# Patient Record
Sex: FEMALE | Race: WHITE | NOT HISPANIC OR LATINO | Employment: OTHER | URBAN - METROPOLITAN AREA
[De-identification: names, ages, dates, MRNs, and addresses within clinical notes are randomized per-mention and may not be internally consistent; named-entity substitution may affect disease eponyms.]

---

## 2019-04-02 ENCOUNTER — HOSPITAL ENCOUNTER (INPATIENT)
Facility: HOSPITAL | Age: 79
LOS: 1 days | Discharge: HOME HEALTH | DRG: 066 | End: 2019-04-04
Attending: EMERGENCY MEDICINE
Payer: MEDICARE

## 2019-04-02 DIAGNOSIS — I71.40 ABDOMINAL AORTIC ANEURYSM (AAA) WITHOUT RUPTURE: Chronic | ICD-10-CM

## 2019-04-02 DIAGNOSIS — G45.9 TIA (TRANSIENT ISCHEMIC ATTACK): ICD-10-CM

## 2019-04-02 DIAGNOSIS — G45.9 TRANSIENT ISCHEMIC ATTACK: ICD-10-CM

## 2019-04-02 DIAGNOSIS — I63.9 STROKE: ICD-10-CM

## 2019-04-02 DIAGNOSIS — I63.9 ACUTE CVA (CEREBROVASCULAR ACCIDENT): Primary | ICD-10-CM

## 2019-04-02 PROBLEM — I10 ESSENTIAL HYPERTENSION: Chronic | Status: ACTIVE | Noted: 2019-04-02

## 2019-04-02 PROBLEM — N18.30 CKD (CHRONIC KIDNEY DISEASE) STAGE 3, GFR 30-59 ML/MIN: Chronic | Status: ACTIVE | Noted: 2019-04-02

## 2019-04-02 LAB
ALBUMIN SERPL BCP-MCNC: 3.6 G/DL (ref 3.5–5.2)
ALP SERPL-CCNC: 88 U/L (ref 55–135)
ALT SERPL W/O P-5'-P-CCNC: 7 U/L (ref 10–44)
ANION GAP SERPL CALC-SCNC: 17 MMOL/L (ref 8–16)
ANION GAP SERPL CALC-SCNC: 9 MMOL/L (ref 8–16)
APTT BLDCRRT: 28.7 SEC (ref 21–32)
AST SERPL-CCNC: 19 U/L (ref 10–40)
BASOPHILS # BLD AUTO: 0.01 K/UL (ref 0–0.2)
BASOPHILS NFR BLD: 0.1 % (ref 0–1.9)
BILIRUB SERPL-MCNC: 0.4 MG/DL (ref 0.1–1)
BUN SERPL-MCNC: 12 MG/DL (ref 6–30)
BUN SERPL-MCNC: 12 MG/DL (ref 8–23)
CALCIUM SERPL-MCNC: 9.2 MG/DL (ref 8.7–10.5)
CHLORIDE SERPL-SCNC: 107 MMOL/L (ref 95–110)
CHLORIDE SERPL-SCNC: 109 MMOL/L (ref 95–110)
CHOLEST SERPL-MCNC: 141 MG/DL (ref 120–199)
CHOLEST/HDLC SERPL: 5.4 {RATIO} (ref 2–5)
CO2 SERPL-SCNC: 24 MMOL/L (ref 23–29)
CREAT SERPL-MCNC: 1.1 MG/DL (ref 0.5–1.4)
CREAT SERPL-MCNC: 1.2 MG/DL (ref 0.5–1.4)
DIFFERENTIAL METHOD: ABNORMAL
EOSINOPHIL # BLD AUTO: 0.2 K/UL (ref 0–0.5)
EOSINOPHIL NFR BLD: 2.6 % (ref 0–8)
ERYTHROCYTE [DISTWIDTH] IN BLOOD BY AUTOMATED COUNT: 14.2 % (ref 11.5–14.5)
EST. GFR  (AFRICAN AMERICAN): 50 ML/MIN/1.73 M^2
EST. GFR  (NON AFRICAN AMERICAN): 43 ML/MIN/1.73 M^2
GLUCOSE SERPL-MCNC: 72 MG/DL (ref 70–110)
GLUCOSE SERPL-MCNC: 86 MG/DL (ref 70–110)
HCT VFR BLD AUTO: 41.2 % (ref 37–48.5)
HCT VFR BLD CALC: 37 %PCV (ref 36–54)
HDLC SERPL-MCNC: 26 MG/DL (ref 40–75)
HDLC SERPL: 18.4 % (ref 20–50)
HGB BLD-MCNC: 12.7 G/DL (ref 12–16)
INR PPP: 1 (ref 0.8–1.2)
LDLC SERPL CALC-MCNC: 77.4 MG/DL (ref 63–159)
LYMPHOCYTES # BLD AUTO: 1.6 K/UL (ref 1–4.8)
LYMPHOCYTES NFR BLD: 21.2 % (ref 18–48)
MCH RBC QN AUTO: 27.5 PG (ref 27–31)
MCHC RBC AUTO-ENTMCNC: 30.8 G/DL (ref 32–36)
MCV RBC AUTO: 89 FL (ref 82–98)
MONOCYTES # BLD AUTO: 0.7 K/UL (ref 0.3–1)
MONOCYTES NFR BLD: 9 % (ref 4–15)
NEUTROPHILS # BLD AUTO: 5.1 K/UL (ref 1.8–7.7)
NEUTROPHILS NFR BLD: 66.8 % (ref 38–73)
NONHDLC SERPL-MCNC: 115 MG/DL
PLATELET # BLD AUTO: 273 K/UL (ref 150–350)
PMV BLD AUTO: 10.4 FL (ref 9.2–12.9)
POC IONIZED CALCIUM: 1.16 MMOL/L (ref 1.06–1.42)
POC TCO2 (MEASURED): 23 MMOL/L (ref 23–29)
POTASSIUM BLD-SCNC: 3.9 MMOL/L (ref 3.5–5.1)
POTASSIUM SERPL-SCNC: 3.8 MMOL/L (ref 3.5–5.1)
PROT SERPL-MCNC: 7.8 G/DL (ref 6–8.4)
PROTHROMBIN TIME: 10.6 SEC (ref 9–12.5)
RBC # BLD AUTO: 4.62 M/UL (ref 4–5.4)
SAMPLE: ABNORMAL
SODIUM BLD-SCNC: 142 MMOL/L (ref 136–145)
SODIUM SERPL-SCNC: 142 MMOL/L (ref 136–145)
TRIGL SERPL-MCNC: 188 MG/DL (ref 30–150)
TROPONIN I SERPL DL<=0.01 NG/ML-MCNC: <0.006 NG/ML (ref 0–0.03)
TSH SERPL DL<=0.005 MIU/L-ACNC: 1.85 UIU/ML (ref 0.4–4)
WBC # BLD AUTO: 7.58 K/UL (ref 3.9–12.7)

## 2019-04-02 PROCEDURE — 63600175 PHARM REV CODE 636 W HCPCS: Performed by: EMERGENCY MEDICINE

## 2019-04-02 PROCEDURE — 93010 ELECTROCARDIOGRAM REPORT: CPT | Mod: ,,, | Performed by: INTERNAL MEDICINE

## 2019-04-02 PROCEDURE — 84132 ASSAY OF SERUM POTASSIUM: CPT

## 2019-04-02 PROCEDURE — 82565 ASSAY OF CREATININE: CPT

## 2019-04-02 PROCEDURE — 83036 HEMOGLOBIN GLYCOSYLATED A1C: CPT

## 2019-04-02 PROCEDURE — G0378 HOSPITAL OBSERVATION PER HR: HCPCS

## 2019-04-02 PROCEDURE — 85014 HEMATOCRIT: CPT

## 2019-04-02 PROCEDURE — 94761 N-INVAS EAR/PLS OXIMETRY MLT: CPT

## 2019-04-02 PROCEDURE — 80053 COMPREHEN METABOLIC PANEL: CPT

## 2019-04-02 PROCEDURE — 93010 EKG 12-LEAD: ICD-10-PCS | Mod: ,,, | Performed by: INTERNAL MEDICINE

## 2019-04-02 PROCEDURE — 84443 ASSAY THYROID STIM HORMONE: CPT

## 2019-04-02 PROCEDURE — 84295 ASSAY OF SERUM SODIUM: CPT

## 2019-04-02 PROCEDURE — 85025 COMPLETE CBC W/AUTO DIFF WBC: CPT

## 2019-04-02 PROCEDURE — 82330 ASSAY OF CALCIUM: CPT

## 2019-04-02 PROCEDURE — 25000003 PHARM REV CODE 250: Performed by: EMERGENCY MEDICINE

## 2019-04-02 PROCEDURE — 25500020 PHARM REV CODE 255: Performed by: EMERGENCY MEDICINE

## 2019-04-02 PROCEDURE — 96372 THER/PROPH/DIAG INJ SC/IM: CPT | Mod: 59

## 2019-04-02 PROCEDURE — 99900035 HC TECH TIME PER 15 MIN (STAT)

## 2019-04-02 PROCEDURE — 99285 EMERGENCY DEPT VISIT HI MDM: CPT | Mod: 25

## 2019-04-02 PROCEDURE — 85730 THROMBOPLASTIN TIME PARTIAL: CPT

## 2019-04-02 PROCEDURE — 80061 LIPID PANEL: CPT

## 2019-04-02 PROCEDURE — 84484 ASSAY OF TROPONIN QUANT: CPT

## 2019-04-02 PROCEDURE — 93005 ELECTROCARDIOGRAM TRACING: CPT

## 2019-04-02 PROCEDURE — 85610 PROTHROMBIN TIME: CPT

## 2019-04-02 RX ORDER — METOPROLOL SUCCINATE 25 MG/1
25 TABLET, EXTENDED RELEASE ORAL DAILY
Status: DISCONTINUED | OUTPATIENT
Start: 2019-04-03 | End: 2019-04-03

## 2019-04-02 RX ORDER — NAPROXEN SODIUM 220 MG/1
81 TABLET, FILM COATED ORAL
Status: COMPLETED | OUTPATIENT
Start: 2019-04-02 | End: 2019-04-02

## 2019-04-02 RX ORDER — POLYETHYLENE GLYCOL 3350 17 G/17G
17 POWDER, FOR SOLUTION ORAL DAILY
Status: DISCONTINUED | OUTPATIENT
Start: 2019-04-03 | End: 2019-04-04 | Stop reason: HOSPADM

## 2019-04-02 RX ORDER — ENOXAPARIN SODIUM 100 MG/ML
40 INJECTION SUBCUTANEOUS EVERY 24 HOURS
Status: DISCONTINUED | OUTPATIENT
Start: 2019-04-02 | End: 2019-04-03

## 2019-04-02 RX ORDER — ASPIRIN 325 MG
325 TABLET ORAL DAILY
Status: ON HOLD | COMMUNITY
End: 2019-04-03 | Stop reason: HOSPADM

## 2019-04-02 RX ORDER — CLOPIDOGREL BISULFATE 75 MG/1
75 TABLET ORAL DAILY
Status: DISCONTINUED | OUTPATIENT
Start: 2019-04-03 | End: 2019-04-04 | Stop reason: HOSPADM

## 2019-04-02 RX ORDER — HYDROCODONE BITARTRATE AND ACETAMINOPHEN 5; 325 MG/1; MG/1
1 TABLET ORAL EVERY 4 HOURS PRN
Status: DISCONTINUED | OUTPATIENT
Start: 2019-04-02 | End: 2019-04-03

## 2019-04-02 RX ORDER — AMLODIPINE BESYLATE 5 MG/1
10 TABLET ORAL DAILY
Status: DISCONTINUED | OUTPATIENT
Start: 2019-04-03 | End: 2019-04-03

## 2019-04-02 RX ORDER — CLOPIDOGREL BISULFATE 75 MG/1
75 TABLET ORAL
Status: COMPLETED | OUTPATIENT
Start: 2019-04-02 | End: 2019-04-02

## 2019-04-02 RX ORDER — SODIUM CHLORIDE 0.9 % (FLUSH) 0.9 %
10 SYRINGE (ML) INJECTION
Status: DISCONTINUED | OUTPATIENT
Start: 2019-04-02 | End: 2019-04-04 | Stop reason: HOSPADM

## 2019-04-02 RX ORDER — VALSARTAN 80 MG/1
160 TABLET ORAL DAILY
Status: DISCONTINUED | OUTPATIENT
Start: 2019-04-03 | End: 2019-04-03

## 2019-04-02 RX ADMIN — CLOPIDOGREL BISULFATE 75 MG: 75 TABLET ORAL at 06:04

## 2019-04-02 RX ADMIN — IOHEXOL 75 ML: 350 INJECTION, SOLUTION INTRAVENOUS at 04:04

## 2019-04-02 RX ADMIN — ENOXAPARIN SODIUM 40 MG: 100 INJECTION SUBCUTANEOUS at 06:04

## 2019-04-02 RX ADMIN — ASPIRIN 81 MG 81 MG: 81 TABLET ORAL at 06:04

## 2019-04-02 NOTE — ASSESSMENT & PLAN NOTE
Patient with history of CVA 2014 . Takes ASA but off plavix 1 year ago. Symptoms concerning for acute stroke. Patient presents with left sided weakness and numbness since Sunday night 3/31/19. Symptoms are improving. CTA head neck pening. 2 D echo ordered. PT/OT consult. Continue ASA and statin. Restart plavix? Allow permissive HTN until CTA result. Follow up Neurology further recommendations.

## 2019-04-02 NOTE — HPI
Mr. Lozada is a 77 yo female with significant history for hypertension, AAA, diastolic dysfunction, CKD stage 3 and hx of CVA in 2014 who comes to the hospital for left sides weakness and numbness since Michael 3/31/19. Patient woke up Sunday night with left sided numbness/weakness. Patient unable to ambulates yesterday but now able to ambulate with unsteady gait today. Patient drove self to hospital. Reports symptoms are improving but not back to baseline. Denies headaches, dizziness, change in vision or change in speech. Continues to take ASA but off plavix a year ago. Does not have home BP.     BP on admit 160/115 improved without intervention 111's/50-60's. EKG NSR with first degree AVB. CXR clear. Neurology consult in ED. CTA head neck pending.

## 2019-04-02 NOTE — ASSESSMENT & PLAN NOTE
Hx of endovascular stent graft placement. Encourage smoking cessation. No back or abdominal pain at this time. Stable.

## 2019-04-02 NOTE — ED TRIAGE NOTES
Pt reports she believes she had a stroke on Sunday night and states that she had numbness and tingling with weakness to left side.  Reports that she has had residual weakness and does not feel herself since Michael night.  Denies cp, sob, f/c/n/v/d.  Placed on cardiac monitor, bp cuff, and pulse ox.  VSS, NAD.

## 2019-04-02 NOTE — ED PROVIDER NOTES
"Encounter Date: 4/2/2019  SORT: This is a 78 y.o. female PMHx stroke, aneurysm, HTN who presents for emergent consideration of possible stroke on Sunday. She reports left sided numbness and weakness which began on Sunday. She states inability to walk on Monday, but now she is able to walk. She also reports associated SOB. She states "I didn't come because I'm hard headed." Patient will be moved to a room when one is available for further evaluation and treatment.  JOHN Prescott, PA-C     SCRIBE #1 NOTE: I, Chente Rao, am scribing for, and in the presence of,  Gerardo Jay MD. I have scribed the following portions of the note - Other sections scribed: HPI and ROS.       History     Chief Complaint   Patient presents with    Numbness     " I had a stroke on Sunday." Reports numbness to left arm, and left leg. reports Hx of stroke     CC: Weakness    HPI: This 78 y.o F with a hx of an abdominal aortic aneurysm and HTN presents to the ED c/o acute onset of LUE and LLE weakness since 4/1/19 AM. The pt states "I had a stroke in my sleep." She states that she was unable to ambulate without assistant yesterday, but her strength improved today. She is ambulatory without assistance today. Additionally, the pt reports a generalized headache. The pt denies speech difficulty, fever, chills, diaphoresis, nausea, emesis, diarrhea, abdominal pain, dysuria, extremity pain, visual disturbances, otalgia, sore throat, chest pain, cough, SOB and rash. No prior tx.     PSHx: Abdominal aortic aneurysm repair, endovascular stent        The history is provided by the patient. No  was used.     Review of patient's allergies indicates:   Allergen Reactions    Tetanus vaccines and toxoid Rash     Past Medical History:   Diagnosis Date    Aneurysm     Hypertension      Past Surgical History:   Procedure Laterality Date    ABDOMINAL AORTIC ANEURYSM REPAIR       Family History   Problem Relation Age of Onset    " Heart disease Mother     Cancer Father      Social History     Tobacco Use    Smoking status: Current Every Day Smoker     Packs/day: 0.50     Types: Cigarettes    Smokeless tobacco: Never Used   Substance Use Topics    Alcohol use: No    Drug use: No     Review of Systems   Constitutional: Negative for chills, diaphoresis and fever.   HENT: Negative for congestion, ear pain, rhinorrhea and sore throat.    Eyes: Negative for pain and visual disturbance.   Respiratory: Negative for cough and shortness of breath.    Cardiovascular: Negative for chest pain.   Gastrointestinal: Negative for abdominal pain, diarrhea, nausea and vomiting.   Genitourinary: Negative for dysuria.   Musculoskeletal: Negative for back pain and neck pain.   Skin: Negative for rash.   Neurological: Positive for weakness and headaches. Negative for speech difficulty.       Physical Exam     Initial Vitals [04/02/19 1510]   BP Pulse Resp Temp SpO2   (!) 160/115 77 18 98 °F (36.7 °C) 97 %      MAP       --         Physical Exam  The patient was examined specifically for the following:   General:No significant distress, Good color, Warm and dry. Head and neck:Scalp atraumatic, Neck supple. Neurological:Appropriate conversation, Gross motor deficits. Eyes:Conjugate gaze, Clear corneas. ENT: No epistaxis. Cardiac: Regular rate and rhythm, Grossly normal heart tones. Pulmonary: Wheezing, Rales. Gastrointestinal: Abdominal tenderness, Abdominal distention. Musculoskeletal: Extremity deformity, Apparent pain with range of motion of the joints. Skin: Rash.   The findings on examination were normal.  Mental status examination, cranial nerves, motor and sensory examination are normal. The lungs are clear.  The heart tones are normal.  The abdomen is soft.  ED Course   Procedures  Labs Reviewed   COMPREHENSIVE METABOLIC PANEL - Abnormal; Notable for the following components:       Result Value    ALT 7 (*)     eGFR if  50 (*)     eGFR if  non  43 (*)     All other components within normal limits   CBC W/ AUTO DIFFERENTIAL - Abnormal; Notable for the following components:    MCHC 30.8 (*)     All other components within normal limits   ISTAT PROCEDURE - Abnormal; Notable for the following components:    POC Anion Gap 17 (*)     All other components within normal limits   APTT   PROTIME-INR   TROPONIN I   LIPID PANEL   TSH   ISTAT CHEM8     EKG Readings: (Independently Interpreted)   This patient is in a sinus rhythm with a sinus arrhythmia with a heart rate of 70.  There is a first-degree AV block.  The there is no definite evidence of acute myocardial infarction or malignant arrhythmia.  There are no ST segment or T-wave changes.       Imaging Results           CTA Head and Neck (xpd) (Final result)  Result time 04/02/19 18:02:04    Final result by Rohini Sierra MD (04/02/19 18:02:04)                 Impression:      Near occlusive stenosis at the left common carotid artery at the level of the bifurcation.  Aberrant coursing of the left common carotid and internal carotid artery.    Greater than 70% stenosis at the right common carotid artery bifurcation secondary to significant atherosclerotic disease.  Recommend ultrasound, when clinically appropriate.    Aneurysmal dilatation of the ascending thoracic aorta, which is incompletely imaged.    This report was flagged in Epic as abnormal.      Electronically signed by: Rohini Sierra  Date:    04/02/2019  Time:    18:02             Narrative:    EXAMINATION:  CTA HEAD AND NECK (XPD)    CLINICAL HISTORY:  Focal neuro deficit, new, fixed or worsening, >6 hours;    TECHNIQUE:  CT angiogram was performed from the level of the mack to the top of the head following the IV administration of 75mL of Omnipaque 350.   Sagittal and coronal reconstructions and maximum intensity projection reconstructions were performed. Arterial stenosis percentages are based on NASCET measurement  criteria.    COMPARISON:  11/11/2014    FINDINGS:  Intracranial Compartment:    There is mild cerebral atrophy and chronic small vessel ischemic changes.  There is no acute cranial hemorrhage, definite territorial infarct, mass effect, or midline shift.  If persistent neurological findings, recommend MRI of the brain with diffusion-weighted sequencing.    Aorta: The ascending thoracic aorta is incompletely imaged.  The ascending thoracic aorta visualized measures greater than 4.8 cm in maximal diameter.  The left common carotid artery is seen arising from the surgically repaired thoracic aorta.    Extracranial carotid circulation: There is near occlusive stenosis at the left common carotid artery at the level of the bifurcation.  Aberrant coursing of the left common carotid and internal carotid artery is noted.  There is greater than 70% stenosis at the right common carotid artery bifurcation secondary to significant atherosclerotic disease.    Extracranial vertebral circulation: No hemodynamically significant stenosis, aneurysmal dilatation, or dissection.    Intracranial Arteries: No focal high-grade stenosis, occlusion, or aneurysm.                               X-Ray Chest AP Portable (Final result)  Result time 04/02/19 15:55:41    Final result by Mitali Hawkins MD (04/02/19 15:55:41)                 Impression:      No acute abnormality.      Electronically signed by: Mitali Hawkins MD  Date:    04/02/2019  Time:    15:55             Narrative:    EXAMINATION:  XR CHEST AP PORTABLE    CLINICAL HISTORY:  chest pain;    TECHNIQUE:  Single frontal view of the chest was performed.    COMPARISON:  05/18/2016    FINDINGS:  EKG wires overlie the chest.The lungs are clear, with normal appearance of pulmonary vasculature and no pleural effusion or pneumothorax.    The cardiac silhouette is normal in size. The hilar and mediastinal contours are unremarkable.    Bones are intact.                               Medical decision making:  Given the above, this patient presents to the emergency room with left arm numbness and a little left leg weakness.  The patient reports that she had severe weakness 24 hr ago.  She may well have had a stroke.  TIA is also a possibility.  CTA head neck pending.  I discussed this case with , who recommended observation CTA head and neck and 2D echocardiography.  I will write orders for observation.    This patient does not meet tPA criteria because she has had rapid an almost complete resolution of symptoms. Her neurologic examination is normal.                Scribe Attestation:   Scribe #1: I performed the above scribed service and the documentation accurately describes the services I performed. I attest to the accuracy of the note.    Attending Attestation:           Physician Attestation for Scribe:  Physician Attestation Statement for Scribe #1: I, Gerardo Jay MD, reviewed documentation, as scribed by Chente Rao in my presence, and it is both accurate and complete.                    Clinical Impression:       ICD-10-CM ICD-9-CM   1. Transient ischemic attack G45.9 435.9                                Gerardo Jay MD  04/02/19 1815       Gerardo Jay MD  04/02/19 1817

## 2019-04-02 NOTE — SUBJECTIVE & OBJECTIVE
Past Medical History:   Diagnosis Date    Aneurysm     Hypertension        Past Surgical History:   Procedure Laterality Date    ABDOMINAL AORTIC ANEURYSM REPAIR         Review of patient's allergies indicates:   Allergen Reactions    Tetanus vaccines and toxoid Rash       No current facility-administered medications on file prior to encounter.      Current Outpatient Medications on File Prior to Encounter   Medication Sig    amlodipine (NORVASC) 10 MG tablet Take 50 mg by mouth once daily.    metoprolol succinate (TOPROL-XL) 25 MG 24 hr tablet Take 10 mg by mouth once daily.    valsartan (DIOVAN) 160 MG tablet Take 160 mg by mouth once daily.    clopidogrel (PLAVIX) 75 mg tablet Take 1 tablet (75 mg total) by mouth once daily.     Family History     Problem Relation (Age of Onset)    Cancer Father    Heart disease Mother        Tobacco Use    Smoking status: Current Every Day Smoker     Packs/day: 0.50     Types: Cigarettes    Smokeless tobacco: Never Used   Substance and Sexual Activity    Alcohol use: No    Drug use: No    Sexual activity: Not on file     Review of Systems   Constitutional: Negative.    HENT: Negative.    Eyes: Negative.    Respiratory: Negative.    Cardiovascular: Negative.    Endocrine: Negative.    Genitourinary: Negative.    Musculoskeletal: Negative.    Neurological: Positive for weakness and numbness.   Hematological: Negative.    Psychiatric/Behavioral: Negative.      Objective:     Vital Signs (Most Recent):  Temp: 98 °F (36.7 °C) (04/02/19 1510)  Pulse: 70 (04/02/19 1557)  Resp: (!) 28 (04/02/19 1557)  BP: 113/68 (04/02/19 1547)  SpO2: 96 % (04/02/19 1557) Vital Signs (24h Range):  Temp:  [98 °F (36.7 °C)] 98 °F (36.7 °C)  Pulse:  [66-77] 70  Resp:  [18-28] 28  SpO2:  [96 %-98 %] 96 %  BP: (113-160)/() 113/68     Weight: 55.3 kg (122 lb)  Body mass index is 24.64 kg/m².    Physical Exam   Constitutional: She is oriented to person, place, and time. She appears  well-developed and well-nourished.   HENT:   Head: Atraumatic.   Eyes: EOM are normal.   Neck: Neck supple.   Cardiovascular: Normal rate and regular rhythm.   Pulmonary/Chest: Effort normal.   Abdominal: Soft. Bowel sounds are normal.   Musculoskeletal: Normal range of motion. She exhibits no edema or deformity.   Neurological: She is alert and oriented to person, place, and time. A sensory deficit is present. Coordination (did walk patient as patient did not feel steady or back to baseline. ) abnormal.   Negative finger nose exam. No hand drift.    Skin: Skin is warm and dry.   Psychiatric: She has a normal mood and affect.        Significant Labs: All pertinent labs within the past 24 hours have been reviewed.    Significant Imaging: I have reviewed and interpreted all pertinent imaging results/findings within the past 24 hours.

## 2019-04-02 NOTE — H&P
"[facility] Hospital Medicine  History & Physical    Patient Name: Cleopatra Lozada  MRN: [id number]  Admission Date: 4/2/2019  Attending Physician: Gerardo Jay MD   Primary Care Provider: Latesha Dias MD         Patient information was obtained from patient and ER records.     Subjective:     Principal Problem:Acute CVA (cerebrovascular accident)    Chief Complaint:   Chief Complaint   Patient presents with    Numbness     " I had a stroke on Sunday." Reports numbness to left arm, and left leg. reports Hx of stroke        HPI: Mr. Lozada is a 79 yo female with significant history for hypertension, AAA, diastolic dysfunction, CKD stage 3 and hx of CVA in 2014 who comes to the hospital for left sides weakness and numbness since Michael 3/31/19. Patient woke up Sunday night with left sided numbness/weakness. Patient unable to ambulates yesterday but now able to ambulate with unsteady gait today. Patient drove self to hospital. Reports symptoms are improving but not back to baseline. Denies headaches, dizziness, change in vision or change in speech. Continues to take ASA but off plavix a year ago. Does not have home BP.     BP on admit 160/115 improved without intervention 111's/50-60's. EKG NSR with first degree AVB. CXR clear. Neurology consult in ED. CTA head neck pending.     Past Medical History:   Diagnosis Date    Aneurysm     Hypertension        Past Surgical History:   Procedure Laterality Date    ABDOMINAL AORTIC ANEURYSM REPAIR         Review of patient's allergies indicates:   Allergen Reactions    Tetanus vaccines and toxoid Rash       No current facility-administered medications on file prior to encounter.      Current Outpatient Medications on File Prior to Encounter   Medication Sig    amlodipine (NORVASC) 10 MG tablet Take 50 mg by mouth once daily.    metoprolol succinate (TOPROL-XL) 25 MG 24 hr tablet Take 10 mg by mouth once daily.    valsartan (DIOVAN) 160 MG " tablet Take 160 mg by mouth once daily.    clopidogrel (PLAVIX) 75 mg tablet Take 1 tablet (75 mg total) by mouth once daily.     Family History     Problem Relation (Age of Onset)    Cancer Father    Heart disease Mother        Tobacco Use    Smoking status: Current Every Day Smoker     Packs/day: 0.50     Types: Cigarettes    Smokeless tobacco: Never Used   Substance and Sexual Activity    Alcohol use: No    Drug use: No    Sexual activity: Not on file     Review of Systems   Constitutional: Negative.    HENT: Negative.    Eyes: Negative.    Respiratory: Negative.    Cardiovascular: Negative.    Endocrine: Negative.    Genitourinary: Negative.    Musculoskeletal: Negative.    Neurological: Positive for weakness and numbness.   Hematological: Negative.    Psychiatric/Behavioral: Negative.      Objective:     Vital Signs (Most Recent):  Temp: 98 °F (36.7 °C) (04/02/19 1510)  Pulse: 70 (04/02/19 1557)  Resp: (!) 28 (04/02/19 1557)  BP: 113/68 (04/02/19 1547)  SpO2: 96 % (04/02/19 1557) Vital Signs (24h Range):  Temp:  [98 °F (36.7 °C)] 98 °F (36.7 °C)  Pulse:  [66-77] 70  Resp:  [18-28] 28  SpO2:  [96 %-98 %] 96 %  BP: (113-160)/() 113/68     Weight: 55.3 kg (122 lb)  Body mass index is 24.64 kg/m².    Physical Exam   Constitutional: She is oriented to person, place, and time. She appears well-developed and well-nourished.   HENT:   Head: Atraumatic.   Eyes: EOM are normal.   Neck: Neck supple.   Cardiovascular: Normal rate and regular rhythm.   Pulmonary/Chest: Effort normal.   Abdominal: Soft. Bowel sounds are normal.   Musculoskeletal: Normal range of motion. She exhibits no edema or deformity.   Neurological: She is alert and oriented to person, place, and time. A sensory deficit is present. Coordination (did walk patient as patient did not feel steady or back to baseline. ) abnormal.   Negative finger nose exam. No hand drift.    Skin: Skin is warm and dry.   Psychiatric: She has a normal mood and  affect.        Significant Labs: All pertinent labs within the past 24 hours have been reviewed.    Significant Imaging: I have reviewed and interpreted all pertinent imaging results/findings within the past 24 hours.    Assessment/Plan:     * Acute CVA (cerebrovascular accident)  Patient with history of CVA 2014 . Takes ASA but off plavix 1 year ago. Symptoms concerning for acute stroke. Patient presents with left sided weakness and numbness since Sunday night 3/31/19. Symptoms are improving. CTA head neck pening. 2 D echo ordered. PT/OT consult. Continue ASA and statin. Restart plavix? Allow permissive HTN until CTA result. Follow up Neurology further recommendations.       Abdominal aortic aneurysm  Hx of endovascular stent graft placement. Encourage smoking cessation. No back or abdominal pain at this time. Stable.       CKD (chronic kidney disease) stage 3, GFR 30-59 ml/min  Stable. Avoid nephrotoxins.       Essential hypertension  Currently controlled. Allow permissive HTN for now.         VTE Risk Mitigation (From admission, onward)    None             Michelle Castillo NP  Department of Hospital Medicine   [facility]

## 2019-04-03 PROBLEM — Z01.810 PREOP CARDIOVASCULAR EXAM: Status: ACTIVE | Noted: 2019-04-03

## 2019-04-03 PROBLEM — G45.9 TRANSIENT ISCHEMIC ATTACK: Status: RESOLVED | Noted: 2019-04-02 | Resolved: 2019-04-03

## 2019-04-03 LAB
AORTIC ROOT ANNULUS: 2.49 CM
AORTIC VALVE CUSP SEPERATION: 1.28 CM
ASCENDING AORTA: 3.94 CM
AV INDEX (PROSTH): 0.53
AV MEAN GRADIENT: 11.75 MMHG
AV PEAK GRADIENT: 21.72 MMHG
AV VALVE AREA: 1.71 CM2
AV VELOCITY RATIO: 0.53
BSA FOR ECHO PROCEDURE: 1.49 M2
CV ECHO LV RWT: 0.67 CM
CV STRESS BASE HR: 82 BPM
DIASTOLIC BLOOD PRESSURE: 90 MMHG
DOP CALC AO PEAK VEL: 2.33 M/S
DOP CALC AO VTI: 42.05 CM
DOP CALC LVOT AREA: 3.2 CM2
DOP CALC LVOT DIAMETER: 2.02 CM
DOP CALC LVOT PEAK VEL: 1.24 M/S
DOP CALC LVOT STROKE VOLUME: 72.04 CM3
DOP CALCLVOT PEAK VEL VTI: 22.49 CM
E WAVE DECELERATION TIME: 408.55 MSEC
E/A RATIO: 0.52
ECHO LV POSTERIOR WALL: 1.11 CM (ref 0.6–1.1)
ESTIMATED AVG GLUCOSE: 108 MG/DL (ref 68–131)
FRACTIONAL SHORTENING: 26 % (ref 28–44)
HBA1C MFR BLD HPLC: 5.4 % (ref 4–5.6)
INTERVENTRICULAR SEPTUM: 1.11 CM (ref 0.6–1.1)
IVRT: 0.07 MSEC
LA MAJOR: 3.58 CM
LA MINOR: 4.76 CM
LA WIDTH: 3.49 CM
LEFT ATRIUM SIZE: 3.1 CM
LEFT ATRIUM VOLUME INDEX: 25.6 ML/M2
LEFT ATRIUM VOLUME: 37.58 CM3
LEFT INTERNAL DIMENSION IN SYSTOLE: 2.46 CM (ref 2.1–4)
LEFT VENTRICLE DIASTOLIC VOLUME INDEX: 30.83 ML/M2
LEFT VENTRICLE DIASTOLIC VOLUME: 45.27 ML
LEFT VENTRICLE MASS INDEX: 76.4 G/M2
LEFT VENTRICLE SYSTOLIC VOLUME INDEX: 14.6 ML/M2
LEFT VENTRICLE SYSTOLIC VOLUME: 21.47 ML
LEFT VENTRICULAR INTERNAL DIMENSION IN DIASTOLE: 3.33 CM (ref 3.5–6)
LEFT VENTRICULAR MASS: 112.11 G
MV PEAK A VEL: 1.37 M/S
MV PEAK E VEL: 0.71 M/S
NUC STRESS EJECTION FRACTION: 78 %
OHS CV CPX 85 PERCENT MAX PREDICTED HEART RATE MALE: 116
OHS CV CPX MAX PREDICTED HEART RATE: 136
OHS CV CPX PATIENT IS FEMALE: 1
OHS CV CPX PATIENT IS MALE: 0
OHS CV CPX PEAK DIASTOLIC BLOOD PRESSURE: 83 MMHG
OHS CV CPX PEAK HEAR RATE: 102 BPM
OHS CV CPX PEAK RATE PRESSURE PRODUCT: NORMAL
OHS CV CPX PEAK SYSTOLIC BLOOD PRESSURE: 146 MMHG
OHS CV CPX PERCENT MAX PREDICTED HEART RATE ACHIEVED: 75
OHS CV CPX RATE PRESSURE PRODUCT PRESENTING: NORMAL
PISA TR MAX VEL: 2.35 M/S
PULM VEIN S/D RATIO: 2.96
PV PEAK D VEL: 0.28 M/S
PV PEAK S VEL: 0.83 M/S
PV PEAK VELOCITY: 1.01 CM/S
RA MAJOR: 5.25 CM
RA PRESSURE: 3 MMHG
RA WIDTH: 2.23 CM
RIGHT VENTRICULAR END-DIASTOLIC DIMENSION: 2.14 CM
RV TISSUE DOPPLER FREE WALL SYSTOLIC VELOCITY 1 (APICAL 4 CHAMBER VIEW): 10.6 M/S
SINUS: 3.04 CM
STJ: 2.24 CM
SYSTOLIC BLOOD PRESSURE: 135 MMHG
TR MAX PG: 22.09 MMHG
TRICUSPID ANNULAR PLANE SYSTOLIC EXCURSION: 1.73 CM
TV REST PULMONARY ARTERY PRESSURE: 25 MMHG

## 2019-04-03 PROCEDURE — 99222 1ST HOSP IP/OBS MODERATE 55: CPT | Mod: ,,, | Performed by: PSYCHIATRY & NEUROLOGY

## 2019-04-03 PROCEDURE — 92610 EVALUATE SWALLOWING FUNCTION: CPT

## 2019-04-03 PROCEDURE — 99223 PR INITIAL HOSPITAL CARE,LEVL III: ICD-10-PCS | Mod: 25,,, | Performed by: INTERNAL MEDICINE

## 2019-04-03 PROCEDURE — 99222 PR INITIAL HOSPITAL CARE,LEVL II: ICD-10-PCS | Mod: ,,, | Performed by: PSYCHIATRY & NEUROLOGY

## 2019-04-03 PROCEDURE — 25000003 PHARM REV CODE 250: Performed by: EMERGENCY MEDICINE

## 2019-04-03 PROCEDURE — 94761 N-INVAS EAR/PLS OXIMETRY MLT: CPT

## 2019-04-03 PROCEDURE — 99223 1ST HOSP IP/OBS HIGH 75: CPT | Mod: ,,, | Performed by: SURGERY

## 2019-04-03 PROCEDURE — 21400001 HC TELEMETRY ROOM

## 2019-04-03 PROCEDURE — 97162 PT EVAL MOD COMPLEX 30 MIN: CPT

## 2019-04-03 PROCEDURE — 99223 PR INITIAL HOSPITAL CARE,LEVL III: ICD-10-PCS | Mod: ,,, | Performed by: SURGERY

## 2019-04-03 PROCEDURE — 63600175 PHARM REV CODE 636 W HCPCS: Performed by: HOSPITALIST

## 2019-04-03 PROCEDURE — 97165 OT EVAL LOW COMPLEX 30 MIN: CPT

## 2019-04-03 PROCEDURE — 99223 1ST HOSP IP/OBS HIGH 75: CPT | Mod: 25,,, | Performed by: INTERNAL MEDICINE

## 2019-04-03 PROCEDURE — 25000003 PHARM REV CODE 250: Performed by: NURSE PRACTITIONER

## 2019-04-03 PROCEDURE — 97535 SELF CARE MNGMENT TRAINING: CPT

## 2019-04-03 PROCEDURE — G8996 SWALLOW CURRENT STATUS: HCPCS | Mod: CH

## 2019-04-03 PROCEDURE — 25000003 PHARM REV CODE 250: Performed by: HOSPITALIST

## 2019-04-03 PROCEDURE — G8998 SWALLOW D/C STATUS: HCPCS | Mod: CH

## 2019-04-03 RX ORDER — CLOPIDOGREL BISULFATE 75 MG/1
75 TABLET ORAL DAILY
Qty: 30 TABLET | Refills: 11 | Status: SHIPPED | OUTPATIENT
Start: 2019-04-03 | End: 2021-04-05

## 2019-04-03 RX ORDER — ATORVASTATIN CALCIUM 40 MG/1
40 TABLET, FILM COATED ORAL DAILY
Status: DISCONTINUED | OUTPATIENT
Start: 2019-04-03 | End: 2019-04-03

## 2019-04-03 RX ORDER — ATORVASTATIN CALCIUM 80 MG/1
80 TABLET, FILM COATED ORAL DAILY
Qty: 90 TABLET | Refills: 3 | Status: SHIPPED | OUTPATIENT
Start: 2019-04-04 | End: 2021-04-05

## 2019-04-03 RX ORDER — REGADENOSON 0.08 MG/ML
0.4 INJECTION, SOLUTION INTRAVENOUS ONCE
Status: COMPLETED | OUTPATIENT
Start: 2019-04-03 | End: 2019-04-03

## 2019-04-03 RX ORDER — AMOXICILLIN 250 MG
1 CAPSULE ORAL DAILY PRN
Status: DISCONTINUED | OUTPATIENT
Start: 2019-04-03 | End: 2019-04-04 | Stop reason: HOSPADM

## 2019-04-03 RX ORDER — BENZONATATE 100 MG/1
100 CAPSULE ORAL 3 TIMES DAILY PRN
Status: DISCONTINUED | OUTPATIENT
Start: 2019-04-03 | End: 2019-04-04 | Stop reason: HOSPADM

## 2019-04-03 RX ORDER — RAMELTEON 8 MG/1
8 TABLET ORAL NIGHTLY PRN
Status: DISCONTINUED | OUTPATIENT
Start: 2019-04-03 | End: 2019-04-04 | Stop reason: HOSPADM

## 2019-04-03 RX ORDER — ATORVASTATIN CALCIUM 40 MG/1
80 TABLET, FILM COATED ORAL DAILY
Status: DISCONTINUED | OUTPATIENT
Start: 2019-04-04 | End: 2019-04-04 | Stop reason: HOSPADM

## 2019-04-03 RX ORDER — ASPIRIN 81 MG/1
81 TABLET ORAL DAILY
Status: DISCONTINUED | OUTPATIENT
Start: 2019-04-03 | End: 2019-04-04 | Stop reason: HOSPADM

## 2019-04-03 RX ORDER — SODIUM CHLORIDE 9 MG/ML
INJECTION, SOLUTION INTRAVENOUS CONTINUOUS
Status: DISCONTINUED | OUTPATIENT
Start: 2019-04-03 | End: 2019-04-03

## 2019-04-03 RX ADMIN — REGADENOSON 0.4 MG: 0.08 INJECTION, SOLUTION INTRAVENOUS at 11:04

## 2019-04-03 RX ADMIN — POLYETHYLENE GLYCOL 3350 17 G: 17 POWDER, FOR SOLUTION ORAL at 08:04

## 2019-04-03 RX ADMIN — BENZONATATE 100 MG: 100 CAPSULE ORAL at 04:04

## 2019-04-03 RX ADMIN — ATORVASTATIN CALCIUM 40 MG: 40 TABLET, FILM COATED ORAL at 08:04

## 2019-04-03 RX ADMIN — ASPIRIN 81 MG: 81 TABLET, COATED ORAL at 04:04

## 2019-04-03 RX ADMIN — SODIUM CHLORIDE: 0.9 INJECTION, SOLUTION INTRAVENOUS at 06:04

## 2019-04-03 RX ADMIN — CLOPIDOGREL BISULFATE 75 MG: 75 TABLET ORAL at 08:04

## 2019-04-03 RX ADMIN — GUAIFENESIN AND DEXTROMETHORPHAN HYDROBROMIDE 1 TABLET: 600; 30 TABLET, EXTENDED RELEASE ORAL at 04:04

## 2019-04-03 NOTE — NURSING
Report given to TOÑA Bella. Patient with no complaints of pain and no signs of distress at this time. Patient stable and will continue to be monitored.

## 2019-04-03 NOTE — ASSESSMENT & PLAN NOTE
-s/p EVAR 2011 without appropriate follow up surveillance - rec CTA A/P and outpatient follow up  -No abd or back pain

## 2019-04-03 NOTE — NURSING
Patient arrived to the unit via stretcher accompanied by transport personnel with cardiac monitoring in progress. Patient walked from stretcher to bed slowly with one person assist. Vital signs stable and found in flow sheets with complete patient assessment. Skin dry and intact with a 20g RAC PIV noted saline locked. No complaints of pain and no signs of respiratory distress noted. Call light in reach and bed alarm activated to maintain patient safety. Patient stable and will continue to be monitored.

## 2019-04-03 NOTE — PT/OT/SLP EVAL
Speech Language Pathology Evaluation  Bedside Swallow    Patient Name:  Cleopatra Lozada   MRN:  [id number]  Admitting Diagnosis: Acute CVA (cerebrovascular accident)    Recommendations:                 General Recommendations:  Follow-up not indicated  Diet recommendations:  Regular, Thin   Aspiration Precautions: 1 bite/sip at a time, Alternating bites/sips, Remain upright 30 minutes post meal, Small bites/sips and Standard aspiration precautions   General Precautions: Standard, fall  Communication strategies:  none    History:     Past Medical History:   Diagnosis Date    Aneurysm     abdominal, repaired 11/15/2011    Cigarette smoker     patient reports buying loose tobacco and rolling with a machine, at home    Hypertension     TIA (transient ischemic attack)     patient states 3rd episode       Past Surgical History:   Procedure Laterality Date    ABDOMINAL AORTIC ANEURYSM REPAIR  11/15/2011    TONSILLECTOMY         Social History: Patient lives at home.    Modified Barium Swallow: none on file    Chest X-Rays: 4/2 The lungs are clear, with normal appearance of pulmonary vasculature and no pleural effusion or pneumothorax.    Prior diet: Unrestricted      Subjective     Pt seen bedside with friend present for eval. Pt without c/o  Patient goals: To return home.    Pain/Comfort:  · Pain Rating 1: 0/10    Objective:     Oral Musculature Evaluation  · Oral Musculature: WFL  · Dentition: present and adequate  · Secretion Management: adequate  · Mucosal Quality: good  · Mandibular Strength and Mobility: WFL  · Oral Labial Strength and Mobility: WFL  · Lingual Strength and Mobility: WFL  · Velar Elevation: WFL  · Buccal Strength and Mobility: WFL  · Voice Prior to PO Intake: dry/clear  · Oral Musculature Comments: Labial & lingual musculature is WFL    Bedside Swallow Eval:   Consistencies Assessed:  · Thin liquids via straw x 5 trials.  · Puree x1 trial  · Solids x3 trials     Oral Phase:   · WFL    Pharyngeal  Phase:   · no overt clinical signs/symptoms of aspiration    Compensatory Strategies  · None    Treatment: Rec: PO diet regular/thin ; No ST is indicated at this time    Assessment:     Cleopatra Lozada is a 78 y.o. female with a dx of TIA. Pt's swallow is functional. No overt s/s of aspiration. REc: PO diet of regular with thin liquids. No f/u is necessary at thiis time.    Goals:   Multidisciplinary Problems     SLP Goals     Not on file          Multidisciplinary Problems (Resolved)        Problem: SLP Goal    Goal Priority Disciplines Outcome   SLP Goal   (Resolved)    Low SLP Outcome(s) achieved   Description:  ST. Pt will participate in swallow eval to determine least restrictive diet with no over s/s of aspiration.-GOAL MET 4/3                    Plan:     · Patient to be seen:      · Plan of Care expires:  19  · Plan of Care reviewed with:  patient, friend   · SLP Follow-Up:  No       Discharge recommendations:  other (see comments)(No further ST is warranted at this time)   Barriers to Discharge:  None    Time Tracking:     SLP Treatment Date:   19  Speech Start Time:  1255  Speech Stop Time:  1309     Speech Total Time (min):  14 min    Billable Minutes: Eval Swallow and Oral Function 14 min    Dawn Davis CCC-SLP  2019

## 2019-04-03 NOTE — ASSESSMENT & PLAN NOTE
Patient with history of CVA 2014 . Takes ASA but off plavix 1 year ago. Symptoms concerning for acute stroke. Patient presents with left sided weakness and numbness since Sunday night 3/31/19. Symptoms are improving.   MRI confirms stroke  Neurology consulted- on asa and plavix. Started statin.  CTA shows bilateral CCA stenosis- Vascular consulted. Cardiac clearance for R CEA. Carotid US ordered.   TTE pending  PT, OT, Speech  Permissive HTN  Monitor on tele

## 2019-04-03 NOTE — PLAN OF CARE
Problem: Fall Injury Risk  Goal: Absence of Fall and Fall-Related Injury  Outcome: Ongoing (interventions implemented as appropriate)  Intervention: Identify and Manage Contributors to Fall Injury Risk     04/03/19 0428   Manage Acute Allergic Reaction   Medication Review/Management medications reviewed   Identify and Manage Contributors to Fall Injury Risk   Self-Care Promotion independence encouraged;BADL personal objects within reach;BADL personal routines maintained     Intervention: Promote Injury-Free Environment     04/03/19 0428   Optimize [place] and Functional Mobility   Environmental Safety Modification assistive device/personal items within reach;clutter free environment maintained;lighting adjusted;room near unit station   Optimize Balance and Safe Activity   Safety Promotion/Fall Prevention assistive device/personal item within reach;bed alarm set;Fall Risk reviewed with patient/family;medications reviewed;lighting adjusted;nonskid shoes/socks when out of bed;room near unit station;instructed to call staff for mobility         Problem: Coping Ineffective  Goal: Effective Coping  Outcome: Ongoing (interventions implemented as appropriate)  Intervention: Support and Enhance Coping Strategies     04/03/19 0428   Monitor/Manage Chemotherapy Gastrointestinal Effects   Environmental Support calm environment promoted   Promote Anxiety Reduction   Supportive Measures verbalization of feelings encouraged;self-responsibility promoted;positive reinforcement provided;active listening utilized

## 2019-04-03 NOTE — PT/OT/SLP PROGRESS
Physical Therapy      Patient Name:  Cleopatra Lozada   MRN:  [id number]    Patient not seen at this time for PT eval secondary to Unavailable (pt off unit for medical procedures). Will follow-up as able.    Nickie Bolden, PT

## 2019-04-03 NOTE — PT/OT/SLP EVAL
Physical Therapy Evaluation    Patient Name:  Cleopatra Lozada   MRN:  [id number]    Recommendations:     Discharge Recommendations:  home health PT(with family assistance)   Discharge Equipment Recommendations: cane, straight   Barriers to discharge: Decreased caregiver support    Assessment:     Cleopatra Lozada is a 78 y.o. female admitted with a medical diagnosis of Preop cardiovascular exam/CVA.  She presents with the following impairments/functional limitations:  weakness, impaired functional mobilty, gait instability, impaired balance, decreased lower extremity function, decreased upper extremity function, decreased coordination, decreased safety awareness.    Rehab Prognosis: Good; patient would benefit from acute skilled PT services to address these deficits and reach maximum level of function.    Recent Surgery: * No surgery found *      Plan:     During this hospitalization, patient to be seen 5 x/week(M-F) to address the identified rehab impairments via gait training, therapeutic activities, therapeutic exercises and progress toward the following goals:    · Plan of Care Expires:  04/17/19    Subjective     Chief Complaint: LLE weakness  Patient/Family Comments/goals: to be independent   Pain/Comfort:  · Pain Rating 1: 0/10    Living Environment:  Pt lives alone in a  house with no concerns.  Pt was considering staying with her friend for a little while.   Prior to admission, patients level of function was independent and driving.  Equipment used at home: none.  Upon discharge, patient will have assistance from friend.  Pt has a son who is [place] [facility].     Objective:     Communicated with nurse [place] prior to session.  Patient found HOB elevated with peripheral IV, telemetry upon PT entry to room.    General Precautions: Standard, fall   Orthopedic Precautions:N/A   Braces: N/A     Exams:  · Cognitive Exam:  Patient was able to follow multiple commands.   · Gross Motor Coordination:   impaired  · Postural Exam:  Patient presented with the following abnormalities:    · -       Rounded shoulders  · -       Forward head  · Sensation:    · -       Intact  light/touch BLE  · Skin Integrity/Edema:      · -       Skin integrity: Visible skin intact and n  · -       Edema: None noted BLE  · BLE ROM: WFL  · BLE Strength: 4/5    Functional Mobility:  · Bed Mobility:     · Scooting: stand by assistance  · Supine to Sit: stand by assistance with HOB elevated   · Transfers:     · Sit to Stand:  stand by assistance and contact guard assistance with no AD and hand-held assist  · Bed to Chair: contact guard assistance with  hand-held assist  using  Step Transfer  · Gait: Pt ambulated ~250 ft with CGA/R HHA and no AD.  Pt was holding onto wall/furniture with LUE.  Pt with minimal unsteadiness, decreased step length and wilbur.   · Balance: Pt with fair dynamic standing balance.       Therapeutic Activities and Exercises:  Pt educated on home health PT services.     AM-PAC 6 CLICK MOBILITY  Total Score:20     Patient left up in chair with all lines intact, call button in reach and nurse Jesika notified.    GOALS:   Multidisciplinary Problems     Physical Therapy Goals        Problem: Physical Therapy Goal    Goal Priority Disciplines Outcome Goal Variances Interventions   Physical Therapy Goal     PT, PT/OT      Description:  Goals to be met by: 19     Patient will increase functional independence with mobility by performin. Supine to sit with Modified [place]  2. Rolling to Left and Right with Modified [place]  3. Sit to stand transfer with Modified [place]  4. Bed to chair transfer with Modified [place]  5. Gait >250 feet with Modified [place] using Single-point Cane   6. Lower extremity exercise program 3 sets x10 reps per handout, with independence                      History:     Past Medical History:   Diagnosis Date    Aneurysm     abdominal, repaired 11/15/2011     Cigarette smoker     patient reports buying loose tobacco and rolling with a machine, at home    Hypertension     TIA (transient ischemic attack)     patient states 3rd episode       Past Surgical History:   Procedure Laterality Date    ABDOMINAL AORTIC ANEURYSM REPAIR  11/15/2011    TONSILLECTOMY         Time Tracking:     PT Received On: 04/03/19  PT Start Time: 1553     PT Stop Time: 1607  PT Total Time (min): 14 min     Billable Minutes: Evaluation 14 min      Nickie Bolden, PT  04/03/2019

## 2019-04-03 NOTE — ASSESSMENT & PLAN NOTE
Possible carotid intervention for vascular  Cleared at low to intermediate risk from CV standpoint based off of testing

## 2019-04-03 NOTE — HPI
"78 y.o F with a hx of an abdominal aortic aneurysm and HTN presents to the ED c/o acute onset of LUE and LLE weakness since 4/1/19 AM. The pt states "I had a stroke in my sleep." She states that she was unable to ambulate without assistant yesterday, but her strength improved today. She is ambulatory without assistance today. Additionally, the pt reports a generalized headache. The pt denies speech difficulty, fever, chills, diaphoresis, nausea, emesis, diarrhea, abdominal pain, dysuria, extremity pain, visual disturbances, otalgia, sore throat, chest pain, cough, SOB and rash. No prior tx.     She denies any previous cardiac history was somewhat of a poor historian.  Most of the history is obtained per the medical record.  She denies any current chest pain, shortness of breath or palpitations.  Consult placed for preop cardiovascular assessment prior to possible carotid intervention by vascular.  "

## 2019-04-03 NOTE — PT/OT/SLP EVAL
Occupational Therapy   Evaluation/Treatment and Discharge Note    Name: Cleopatra Lozada  MRN: [id number]  Admitting Diagnosis:  Preop cardiovascular exam      Recommendations:     Discharge Recommendations: home health OT(with family assist)  Discharge Equipment Recommendations:  none  Barriers to discharge:       Assessment:     Cleopatra Lozada is a 78 y.o. female with a medical diagnosis of Preop cardiovascular exam. OT eval is complete. The patient was able to perform self care and amb to the bathroom without AD with SBA. The patient states her LUE has improved and was able to demonstrate fine motor skills WFL for management of packages/open containers. No OT is recommended in the acute hospital but the patient will beenfit from  OT safety eval as the patient lives alone.    Plan:     During this hospitalization, patient does not require further acute OT services.  Please re-consult if situation changes.    · Plan of Care Reviewed with: patient    Subjective     Chief Complaint: wants cough syrup to control her cough  Patient/Family Comments/goals: anxious for D/C to home    Occupational Profile:  Living Environment: The patient lives alone in a  house with no CLAUDIA.  Previous level of function: The patient was (I) with bathing and dressing, without use of AE.  Roles and Routines: The patient was driving.  Equipment Used at home:  none  Assistance upon Discharge: The patient's son lives in [place] and works off [facility] [employer] [facility]. The patient has a good friend who will assist or allow the patient to go to her house    Pain/Comfort:  · Pain Rating 1: 0/10    Patients cultural, spiritual, Anabaptist conflicts given the current situation: no    Objective:     Communicated with: Amelia eugene prior to session.  Patient found HOB elevated with peripheral IV, telemetry upon OT entry to room.    General Precautions: Standard, fall   Orthopedic Precautions:N/A   Braces: N/A     Occupational Performance:    Bed Mobility:     · Patient completed Supine to Sit with stand by assistance    Functional Mobility/Transfers:  · Patient completed Sit <> Stand Transfer with stand by assistance  with  no assistive device   · Patient completed Toilet Transfer Step Transfer technique with supervision with  no AD  · Functional Mobility: The patient was able to amb with SBA, without AD, in her room from the bed to the toilet/sink to chair. The patient was able to manage the bathroom door.    Activities of Daily Living:  · Grooming: supervision to stand at the sink to wash her hands  · Upper Body Dressing: CGA to don a back gown  · Lower Body Dressing:   stand by assistance to don/doff socks while seated in the chair   · Toileting: modified independence      Cognitive/Visual Perceptual:  Cognitive/Psychosocial Skills:     -       Oriented to: Person, Place, Time and Situation   -       Follows Commands/attention:Follows multistep  commands  -       Communication: clear/fluent  -       Memory: No Deficits noted  -       Safety awareness/insight to disability: impaired as the patient denies need for (S)   -       Mood/Affect/Coping skills/emotional control: Appropriate to situation  Visual/Perceptual:      -Intact      Physical Exam:  Balance: -       good sitting, fair+ stand  Postural examination/scapula alignment:    -       No postural abnormalities identified  Skin integrity: Visible skin intact  Edema:  None noted  Sensation:    -       Intact  Dominant hand: -       right  Upper Extremity Range of Motion:     -       Right Upper Extremity: WFL except shldr limited but is WFL  -       Left Upper Extremity: WFL  Upper Extremity Strength:    -       Right Upper Extremity: WFL  -       Left Upper Extremity: WFL   Strength: -       Right Upper Extremity: WFL  -       Left Upper Extremity: WFL  Fine Motor Coordination:    -       Intact to open toothpaste box    AMPAC 6 Click ADL:  AMPAC Total Score: 24    Treatment & Education:  The patient  participated in the OT eval and was educated re: OT role and recommendations. The patient's friend was present at the start of OT and stated she will assist the patient or have her move to her house if needed.    Education:    Patient left up in chair with all lines intact, call button in reach and nurseJalen notified    GOALS:   Multidisciplinary Problems     Occupational Therapy Goals     Not on file          Multidisciplinary Problems (Resolved)        Problem: Occupational Therapy Goal    Goal Priority Disciplines Outcome Interventions   Occupational Therapy Goal   (Resolved)     OT, PT/OT Outcome(s) achieved                    History:     Past Medical History:   Diagnosis Date    Aneurysm     abdominal, repaired 11/15/2011    Cigarette smoker     patient reports buying loose tobacco and rolling with a machine, at home    Hypertension     TIA (transient ischemic attack)     patient states 3rd episode       Past Surgical History:   Procedure Laterality Date    ABDOMINAL AORTIC ANEURYSM REPAIR  11/15/2011    TONSILLECTOMY         Time Tracking:     OT Date of Treatment: 04/03/19  OT Start Time: 1423  OT Stop Time: 1447  OT Total Time (min): 24 min    Billable Minutes:Evaluation 15  Self Care/Home Management 9  Total Time 24    Nellie Guillen OT  4/3/2019

## 2019-04-03 NOTE — SUBJECTIVE & OBJECTIVE
Medications Prior to Admission   Medication Sig Dispense Refill Last Dose    amlodipine (NORVASC) 10 MG tablet Take 50 mg by mouth once daily.   4/2/2019    aspirin 325 MG tablet Take 325 mg by mouth once daily.   4/2/2019 at Unknown time    metoprolol succinate (TOPROL-XL) 25 MG 24 hr tablet Take 10 mg by mouth once daily.   4/2/2019    valsartan (DIOVAN) 160 MG tablet Take 160 mg by mouth once daily.   4/2/2019    clopidogrel (PLAVIX) 75 mg tablet Take 1 tablet (75 mg total) by mouth once daily. 30 tablet 0 Unknown at Unknown time       Review of patient's allergies indicates:   Allergen Reactions    Tetanus vaccines and toxoid Rash       Past Medical History:   Diagnosis Date    Aneurysm     abdominal, repaired 11/15/2011    Cigarette smoker     patient reports buying loose tobacco and rolling with a machine, at home    Hypertension     TIA (transient ischemic attack)     patient states 3rd episode     Past Surgical History:   Procedure Laterality Date    ABDOMINAL AORTIC ANEURYSM REPAIR  11/15/2011    TONSILLECTOMY       Family History     Problem Relation (Age of Onset)    Cancer Father    Heart disease Mother        Tobacco Use    Smoking status: Current Every Day Smoker     Packs/day: 0.50     Types: Cigarettes    Smokeless tobacco: Never Used    Tobacco comment: reports using loose tobacco & rolling herself, by machine   Substance and Sexual Activity    Alcohol use: No    Drug use: No    Sexual activity: Not on file     Review of Systems   Constitutional: Negative for chills and fever.   HENT: Negative for congestion and sore throat.    Eyes: Negative for visual disturbance.   Respiratory: Negative for shortness of breath and wheezing.    Cardiovascular: Negative for chest pain and palpitations.   Gastrointestinal: Negative for abdominal distention, abdominal pain, constipation, diarrhea, nausea and vomiting.   Endocrine: Negative for cold intolerance.   Genitourinary: Negative for dysuria  and hematuria.   Musculoskeletal: Negative for joint swelling.   Skin: Negative for color change, pallor, rash and wound.   Allergic/Immunologic: Negative for immunocompromised state.   Neurological: Negative for seizures, weakness and headaches.   Hematological: Does not bruise/bleed easily.   Psychiatric/Behavioral: Negative for agitation. The patient is not nervous/anxious.      Objective:     Vital Signs (Most Recent):  Temp: 98.1 °F (36.7 °C) (04/03/19 0716)  Pulse: 69 (04/03/19 0716)  Resp: 19 (04/03/19 0716)  BP: (!) 112/56 (04/03/19 0716)  SpO2: (!) 91 % (04/03/19 0716) Vital Signs (24h Range):  Temp:  [98 °F (36.7 °C)-98.4 °F (36.9 °C)] 98.1 °F (36.7 °C)  Pulse:  [64-77] 69  Resp:  [16-28] 19  SpO2:  [91 %-98 %] 91 %  BP: (108-160)/() 112/56     Weight: 53.5 kg (117 lb 15.1 oz)  Body mass index is 23.82 kg/m².    Physical Exam   Constitutional: She appears well-developed and well-nourished. No distress.   HENT:   Head: Normocephalic and atraumatic.   Eyes: Conjunctivae and EOM are normal.   Neck: Neck supple.   Cardiovascular: Normal rate.   Pulses:       Radial pulses are 2+ on the right side, and 2+ on the left side.        Posterior tibial pulses are 1+ on the right side, and 1+ on the left side.   Pulmonary/Chest: Effort normal. No respiratory distress.   Abdominal: Soft. She exhibits no distension and no mass. There is no tenderness. There is no rebound and no guarding. No hernia.   Musculoskeletal: Normal range of motion. She exhibits no edema, tenderness or deformity.   Neurological: She is alert. No cranial nerve deficit or sensory deficit.   Skin: Skin is warm and dry. Capillary refill takes less than 2 seconds. No rash noted. She is not diaphoretic. No pallor.   Psychiatric: She has a normal mood and affect.   Vitals reviewed.      Significant Labs:  All pertinent labs from the last 24 hours have been reviewed.    Significant Diagnostics:  I have reviewed all pertinent imaging  results/findings within the past 24 hours.

## 2019-04-03 NOTE — PLAN OF CARE
Problem: Physical Therapy Goal  Goal: Physical Therapy Goal  Goals to be met by: 19     Patient will increase functional independence with mobility by performin. Supine to sit with Modified [place]  2. Rolling to Left and Right with Modified [place]  3. Sit to stand transfer with Modified [place]  4. Bed to chair transfer with Modified [place]  5. Gait >250 feet with Modified [place] using Single-point Cane   6. Lower extremity exercise program 3 sets x10 reps per handout, with independence    Pt ambulated ~250 ft with CGA/HHA using no AD.

## 2019-04-03 NOTE — HPI
Pietro Steward MD [facility]                         04/03/2019    HPI:  Cleopatra Lozada is a 78 y.o. female with   Patient Active Problem List   Diagnosis    Abdominal aortic aneurysm    Acute CVA (cerebrovascular accident)    Essential hypertension    CKD (chronic kidney disease) stage 3, GFR 30-59 ml/min    Transient ischemic attack    being managed by PCP and specialists who is here today for evaluation of CVA.  S/p L hemispheric CVA 2014 with <50% stenosis bilateral ICA with pt declining further mgmt at that time.  S/p EVAR 2011 without follow up in last few yrs.  Pt presented 4/2/19 with c/o LUE weakness.  MRI brain showed R corona radiata and internal capsule acute infarct.  CTA neck shows R ICA stenosis (near occlusion on Radiology report), L ICA with <50% stenosis.  Vascular Surgery consulted for further mgmt.  Pt states 3/31/19 while in bed she developed LUE and LLE weakness and tingling.  This improved but did not resolve until 4/3/19.  She had trouble ambulating to the restroom that night due to weakness.  She also had L sided body tingling.    no MI  yes Stroke  Tobacco use: 1/2 ppd x 60 yrs    Past Medical History:   Diagnosis Date    Aneurysm     abdominal, repaired 11/15/2011    Cigarette smoker     patient reports buying loose tobacco and rolling with a machine, at home    Hypertension     TIA (transient ischemic attack)     patient states 3rd episode     Past Surgical History:   Procedure Laterality Date    ABDOMINAL AORTIC ANEURYSM REPAIR  11/15/2011    TONSILLECTOMY       Family History   Problem Relation Age of Onset    Heart disease Mother     Cancer Father      Social History     Socioeconomic History    Marital status: Single     Spouse name: Not on file    Number of children: Not on file    Years of education: Not on file    Highest education level: Not on file   Occupational History    Not on file   Social Needs     Financial resource strain: Not on file    Food insecurity:     Worry: Not on file     Inability: Not on file    Transportation needs:     Medical: Not on file     Non-medical: Not on file   Tobacco Use    Smoking status: Current Every Day Smoker     Packs/day: 0.50     Types: Cigarettes    Smokeless tobacco: Never Used    Tobacco comment: reports using loose tobacco & rolling herself, by machine   Substance and Sexual Activity    Alcohol use: No    Drug use: No    Sexual activity: Not on file   Lifestyle    Physical activity:     Days per week: Not on file     Minutes per session: Not on file    Stress: Not on file   Relationships    Social connections:     Talks on phone: Not on file     Gets together: Not on file     Attends Alevism service: Not on file     Active member of club or organization: Not on file     Attends meetings of clubs or organizations: Not on file     Relationship status: Not on file   Other Topics Concern    Not on file   Social History Narrative    Not on file       Current Facility-Administered Medications:     atorvastatin tablet 40 mg, 40 mg, Oral, Daily, Josi Kelly MD    clopidogrel tablet 75 mg, 75 mg, Oral, Daily, Gerardo Jay MD    polyethylene glycol packet 17 g, 17 g, Oral, Daily, Gerardo Jay MD    promethazine (PHENERGAN) 6.25 mg in dextrose 5 % 50 mL IVPB, 6.25 mg, Intravenous, Q6H PRN, Gerardo Jay MD    sodium chloride 0.9% flush 10 mL, 10 mL, Intravenous, PRN, Gerardo Jay MD

## 2019-04-03 NOTE — CONSULTS
"[facility]  Neurology  Consult Note    Patient Name: Cleopatra Lozada  MRN: [id number]  Admission Date: 4/2/2019  Hospital Length of Stay: 0 days  Code Status: Full Code   Attending Provider: Josi Kelly MD   Consulting Provider: Lopez Bahena MD  Primary Care Physician: Latesha Dias MD  Principal Problem:Acute CVA (cerebrovascular accident)    Inpatient consult to Neurology  Consult performed by: Lopez Bahena MD  Consult ordered by: Gerardo Jay MD        Subjective:     Chief Complaint: "I had a stroke"     HPI: 77 y/o male with medical Hx as listed below comes to ED after complaining of numbness and weakness on left side for two days. Pt decided no to come to ED for medical evaluation but upon no resolution pt presented to our ER. Ms. Lozada denies visual or speech disturbances, vertigo, right sided involvement. Ms. Lozada reprots an episode of bilateral loss of vision several years ago. No aggravating or alleviating factors.    Past Medical History:   Diagnosis Date    Aneurysm     abdominal, repaired 11/15/2011    Cigarette smoker     patient reports buying loose tobacco and rolling with a machine, at home    Hypertension     TIA (transient ischemic attack)     patient states 3rd episode       Past Surgical History:   Procedure Laterality Date    ABDOMINAL AORTIC ANEURYSM REPAIR  11/15/2011    TONSILLECTOMY         Review of patient's allergies indicates:   Allergen Reactions    Tetanus vaccines and toxoid Rash       Current Neurological Medications:     No current facility-administered medications on file prior to encounter.      Current Outpatient Medications on File Prior to Encounter   Medication Sig    amlodipine (NORVASC) 10 MG tablet Take 50 mg by mouth once daily.    aspirin 325 MG tablet Take 325 mg by mouth once daily.    metoprolol succinate (TOPROL-XL) 25 MG 24 hr tablet Take 10 mg by mouth once daily.    valsartan (DIOVAN) 160 MG tablet Take 160 mg " by mouth once daily.    clopidogrel (PLAVIX) 75 mg tablet Take 1 tablet (75 mg total) by mouth once daily.      Family History     Problem Relation (Age of Onset)    Cancer Father    Heart disease Mother        Tobacco Use    Smoking status: Current Every Day Smoker     Packs/day: 0.50     Types: Cigarettes    Smokeless tobacco: Never Used    Tobacco comment: reports using loose tobacco & rolling herself, by machine   Substance and Sexual Activity    Alcohol use: No    Drug use: No    Sexual activity: Not on file     Review of Systems   Constitutional: Negative for fever.   HENT: Negative for trouble swallowing.    Eyes: Negative for photophobia.   Respiratory: Negative for shortness of breath.    Cardiovascular: Negative for chest pain.   Gastrointestinal: Negative for abdominal pain.   Genitourinary: Negative for dysuria.   Musculoskeletal: Negative for back pain.   Neurological: Negative for headaches.          Objective:     Vital Signs (Most Recent):  Temp: 98.1 °F (36.7 °C) (04/03/19 0716)  Pulse: 82 (04/03/19 0908)  Resp: 19 (04/03/19 0716)  BP: (!) 108/59 (04/03/19 0908)  SpO2: (!) 91 % (04/03/19 0716) Vital Signs (24h Range):  Temp:  [98 °F (36.7 °C)-98.4 °F (36.9 °C)] 98.1 °F (36.7 °C)  Pulse:  [64-82] 82  Resp:  [16-28] 19  SpO2:  [91 %-98 %] 91 %  BP: (108-160)/() 108/59     Weight: 53.1 kg (117 lb)  Body mass index is 23.63 kg/m².    Physical Exam   Constitutional: She is oriented to person, place, and time. No distress.   Eyes: Right eye exhibits no discharge. Left eye exhibits no discharge.   Neck: Neck supple.   Cardiovascular: Regular rhythm.   Pulmonary/Chest: Breath sounds normal.   Abdominal: Bowel sounds are normal.   Musculoskeletal: She exhibits no edema.   Neurological: She is oriented to person, place, and time. She has normal strength. She has a normal Finger-Nose-Finger Test and a normal Heel to Shin Test.   Reflex Scores:       Tricep reflexes are 2+ on the right side and 2+  on the left side.       Bicep reflexes are 2+ on the right side and 2+ on the left side.       Brachioradialis reflexes are 2+ on the right side and 2+ on the left side.       Patellar reflexes are 2+ on the right side and 2+ on the left side.       Achilles reflexes are 2+ on the right side and 2+ on the left side.  Skin: She is not diaphoretic.   Psychiatric: Her speech is normal.       NEUROLOGICAL EXAMINATION:     MENTAL STATUS   Oriented to person, place, and time.   Speech: speech is normal   Level of consciousness: alert    CRANIAL NERVES     CN III, IV, VI   Right pupil: Size: 2 mm. Shape: regular. Reactivity: brisk.   Left pupil: Size: 2 mm. Shape: regular. Reactivity: brisk.   Nystagmus: none   Ophthalmoparesis: none  Conjugate gaze: present    CN V   Right facial sensation deficit: none  Left facial sensation deficit: none    CN VII   Right facial weakness: none  Left facial weakness: none    CN IX, X   Palate: symmetric    CN XI   Right trapezius strength: normal  Left trapezius strength: normal    CN XII   Tongue deviation: none    MOTOR EXAM     Strength   Strength 5/5 throughout.     REFLEXES     Reflexes   Right brachioradialis: 2+  Left brachioradialis: 2+  Right biceps: 2+  Left biceps: 2+  Right triceps: 2+  Left triceps: 2+  Right patellar: 2+  Left patellar: 2+  Right achilles: 2+  Left achilles: 2+    SENSORY EXAM   Right arm light touch: normal  Left arm light touch: normal  Right leg light touch: normal  Left leg light touch: normal    GAIT AND COORDINATION      Coordination   Finger to nose coordination: normal  Heel to shin coordination: normal       Decreased wilbur and length of stride   NIHSS: 0    Significant Labs:   CBC:   Recent Labs   Lab 04/02/19  1530 04/02/19  1557   WBC 7.58  --    HGB 12.7  --    HCT 41.2 37     --      CMP:   Recent Labs   Lab 04/02/19  1530   GLU 72      K 3.8      CO2 24   BUN 12   CREATININE 1.2   CALCIUM 9.2   PROT 7.8   ALBUMIN 3.6    BILITOT 0.4   ALKPHOS 88   AST 19   ALT 7*   ANIONGAP 9   EGFRNONAA 43*       Significant Imaging:  MRI brain  Impression       Focus of diffusion restriction in the right corona radiata with extension into the posterior limb of the right internal capsule, consistent with acute infarction.  No evidence of hemorrhagic conversion.    Old lacunar type infarctions as described above.    Changes of chronic small vessel ischemic disease and cerebral volume loss.      Electronically signed by: Chad De La O MD  Date: 04/02/2019  Time: 21:40     CTA  Impression       Near occlusive stenosis at the left common carotid artery at the level of the bifurcation.  Aberrant coursing of the left common carotid and internal carotid artery.    Greater than 70% stenosis at the right common carotid artery bifurcation secondary to significant atherosclerotic disease.  Recommend ultrasound, when clinically appropriate.    Aneurysmal dilatation of the ascending thoracic aorta, which is incompletely imaged.    This report was flagged in Epic as abnormal.      Electronically signed by: Rohini Sierra  Date: 04/02/2019  Time: 18:02       Assessment and Plan:     77 y/o female with acute stroke    1. 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   Internal capsul irrigation comes from Yvette and LSA arteries which are tributaries of MCA. Pt has significant occlusive disease on both carotis left > right. Carotid US pending.   Pt is on dual antiplatelets and statin.   -Permissive HTN for now. Treat BP if > 220/110.   -Vascular surgery consulted. Work up in process.    Active Diagnoses:    Diagnosis Date Noted POA    PRINCIPAL PROBLEM:  Acute CVA (cerebrovascular accident) [I63.9] 04/02/2019 Yes    Essential hypertension [I10] 04/02/2019 Yes     Chronic    CKD (chronic kidney disease) stage 3, GFR 30-59 ml/min [N18.3] 04/02/2019 Yes     Chronic    Transient ischemic attack [G45.9] 04/02/2019 Yes     Abdominal aortic aneurysm [I71.4] 03/12/2014 Yes     Chronic      Problems Resolved During this Admission:       VTE Risk Mitigation (From admission, onward)        Ordered     IP VTE HIGH RISK PATIENT  Once      04/02/19 3234          Thank you for your consult. I will follow-up with patient. Please contact us if you have any additional questions.    Lopez Bahena MD  Neurology  [facility]

## 2019-04-03 NOTE — CONSULTS
"[facility]  Cardiology  Consult Note    Patient Name: Cleopatra Lozada  MRN: [id number]  Admission Date: 4/2/2019  Hospital Length of Stay: 0 days  Code Status: Full Code   Attending Provider: Josi Kelly MD   Consulting Provider: Taiwo Hoyt MD  Primary Care Physician: Latesha Dias MD  Principal Problem:Preop cardiovascular exam    Patient information was obtained from patient, past medical records and ER records.     Inpatient consult to Cardiology  Consult performed by: Taiwo Hoyt MD  Consult ordered by: Michelle Castillo NP  Reason for consult: Preop cardiovascular exam        Subjective:     Chief Complaint:  Weakness     HPI:   78 y.o F with a hx of an abdominal aortic aneurysm and HTN presents to the ED c/o acute onset of LUE and LLE weakness since 4/1/19 AM. The pt states "I had a stroke in my sleep." She states that she was unable to ambulate without assistant yesterday, but her strength improved today. She is ambulatory without assistance today. Additionally, the pt reports a generalized headache. The pt denies speech difficulty, fever, chills, diaphoresis, nausea, emesis, diarrhea, abdominal pain, dysuria, extremity pain, visual disturbances, otalgia, sore throat, chest pain, cough, SOB and rash. No prior tx.     She denies any previous cardiac history was somewhat of a poor historian.  Most of the history is obtained per the medical record.  She denies any current chest pain, shortness of breath or palpitations.  Consult placed for preop cardiovascular assessment prior to possible carotid intervention by vascular.    Past Medical History:   Diagnosis Date    Aneurysm     abdominal, repaired 11/15/2011    Cigarette smoker     patient reports buying loose tobacco and rolling with a machine, at home    Hypertension     TIA (transient ischemic attack)     patient states 3rd episode       Past Surgical History:   Procedure Laterality Date    ABDOMINAL AORTIC " ANEURYSM REPAIR  11/15/2011    TONSILLECTOMY         Review of patient's allergies indicates:   Allergen Reactions    Tetanus vaccines and toxoid Rash       No current facility-administered medications on file prior to encounter.      Current Outpatient Medications on File Prior to Encounter   Medication Sig    amlodipine (NORVASC) 10 MG tablet Take 50 mg by mouth once daily.    aspirin 325 MG tablet Take 325 mg by mouth once daily.    metoprolol succinate (TOPROL-XL) 25 MG 24 hr tablet Take 10 mg by mouth once daily.    valsartan (DIOVAN) 160 MG tablet Take 160 mg by mouth once daily.    clopidogrel (PLAVIX) 75 mg tablet Take 1 tablet (75 mg total) by mouth once daily.     Family History     Problem Relation (Age of Onset)    Cancer Father    Heart disease Mother        Tobacco Use    Smoking status: Current Every Day Smoker     Packs/day: 0.50     Types: Cigarettes    Smokeless tobacco: Never Used    Tobacco comment: reports using loose tobacco & rolling herself, by machine   Substance and Sexual Activity    Alcohol use: No    Drug use: No    Sexual activity: Not on file     Review of Systems   Unable to perform ROS: acuity of condition     Objective:     Vital Signs (Most Recent):  Temp: 98.1 °F (36.7 °C) (04/03/19 0716)  Pulse: 82 (04/03/19 0908)  Resp: 19 (04/03/19 0716)  BP: (!) 108/59 (04/03/19 0908)  SpO2: (!) 91 % (04/03/19 0716) Vital Signs (24h Range):  Temp:  [98 °F (36.7 °C)-98.4 °F (36.9 °C)] 98.1 °F (36.7 °C)  Pulse:  [64-82] 82  Resp:  [16-28] 19  SpO2:  [91 %-98 %] 91 %  BP: (108-160)/() 108/59     Weight: 53.1 kg (117 lb)  Body mass index is 23.63 kg/m².    SpO2: (!) 91 %  O2 Device (Oxygen Therapy): room air      Intake/Output Summary (Last 24 hours) at 4/3/2019 1509  Last data filed at 4/2/2019 2158  Gross per 24 hour   Intake 360 ml   Output --   Net 360 ml       Lines/Drains/Airways     Peripheral Intravenous Line                 Peripheral IV - Single Lumen 04/02/19 1617  Right Antecubital less than 1 day                Physical Exam   Constitutional: She appears well-developed and well-nourished.   HENT:   Head: Normocephalic and atraumatic.   Eyes: Pupils are equal, round, and reactive to light. Conjunctivae and EOM are normal.   Neck: Normal range of motion. Neck supple. No thyromegaly present.   Cardiovascular: Normal rate and regular rhythm.   Murmur heard.   Harsh midsystolic murmur is present with a grade of 2/6 at the upper right sternal border radiating to the neck.  Pulmonary/Chest: Effort normal and breath sounds normal. No respiratory distress.   Abdominal: Soft. Bowel sounds are normal.   Musculoskeletal: She exhibits no edema.   Neurological: She is alert.   Skin: Skin is warm and dry.   Psychiatric: She has a normal mood and affect. Her behavior is normal.       Significant Labs:   CMP   Recent Labs   Lab 04/02/19  1530      K 3.8      CO2 24   GLU 72   BUN 12   CREATININE 1.2   CALCIUM 9.2   PROT 7.8   ALBUMIN 3.6   BILITOT 0.4   ALKPHOS 88   AST 19   ALT 7*   ANIONGAP 9   ESTGFRAFRICA 50*   EGFRNONAA 43*   , CBC   Recent Labs   Lab 04/02/19  1530 04/02/19  1557   WBC 7.58  --    HGB 12.7  --    HCT 41.2 37     --    , INR   Recent Labs   Lab 04/02/19  1530   INR 1.0   , Lipid Panel   Recent Labs   Lab 04/02/19  1530   CHOL 141   HDL 26*   LDLCALC 77.4   TRIG 188*   CHOLHDL 18.4*    and Troponin   Recent Labs   Lab 04/02/19  1530   TROPONINI <0.006       Significant Imaging: Echocardiogram:   Transthoracic echo (TTE) complete (Cupid Only):   Results for orders placed or performed during the hospital encounter of 04/02/19   Transthoracic echo (TTE) 2D with Color Flow   Result Value Ref Range    BSA 1.49 m2    LA WIDTH 3.49 cm    AORTIC VALVE CUSP SEPERATION 1.28 cm    PV PEAK VELOCITY 1.01 cm/s    LVIDD 3.33 (A) 3.5 - 6.0 cm    IVS 1.11 (A) 0.6 - 1.1 cm    PW 1.11 (A) 0.6 - 1.1 cm    Ao root annulus 2.49 cm    LVIDS 2.46 2.1 - 4.0 cm    FS 26 28 - 44  %    LA volume 37.58 cm3    Sinus 3.04 cm    STJ 2.24 cm    Ascending aorta 3.94 cm    LV mass 112.11 g    LA size 3.10 cm    RVDD 2.14 cm    TAPSE 1.73 cm    RV S' 10.60 m/s    Left Ventricle Relative Wall Thickness 0.67 cm    AV mean gradient 11.75 mmHg    AV valve area 1.71 cm2    AV Velocity Ratio 0.53     AV index (prosthetic) 0.53     E/A ratio 0.52     E wave decelartion time 408.55 msec    IVRT 0.07 msec    Pulm vein S/D ratio 2.96     LVOT diameter 2.02 cm    LVOT area 3.20 cm2    LVOT peak abimael [id number] m/s    LVOT peak VTI 22.49 cm    Ao peak abimael 2.33 m/s    Ao VTI 42.05 cm    LVOT stroke volume 72.04 cm3    AV peak gradient 21.72 mmHg    MV Peak E Abimael 0.71 m/s    TR Max Abimael 2.35 m/s    MV Peak A Abimael 1.37 m/s    PV Peak S Abimael 0.83 m/s    PV Peak D Abimael 0.28 m/s    LV Systolic Volume 21.47 mL    LV Systolic Volume Index 14.6 mL/m2    LV Diastolic Volume 45.27 mL    LV Diastolic Volume Index 30.83 mL/m2    LA Volume Index 25.6 mL/m2    LV Mass Index 76.4 g/m2    RA Major Axis 5.25 cm    Left Atrium Minor Axis 4.76 cm    Left Atrium Major Axis 3.58 cm    Triscuspid Valve Regurgitation Peak Gradient 22.09 mmHg    RA Width 2.23 cm    Right Atrial Pressure (from IVC) 3 mmHg    TV rest pulmonary artery pressure 25 mmHg     · Normal left ventricular systolic function. The estimated ejection fraction is 55%  · Concentric left ventricular remodeling.  · Mild aortic valve stenosis.  · Aortic valve area is 1.71 cm2; peak velocity is 2.33 m/s; mean gradient is 11.75 mmHg.    NST:  · Normal myocardial perfusion scan  · Arrhythmias during stress: PACs.ISOLATED PACs  · There was no ST segment deviation noted during stress.  · The patient reported light headedness and abdominal pain during the stress test.  · The perfusion scan is free of evidence from myocardial ischemia or injury.  · LVEF post-stress is 78%  · The EKG portion of this study is negative for myocardial ischemia.     * all labs reviewed an echocardiogram  +NST personally interpreted    Assessment and Plan:     * Preop cardiovascular exam  Possible carotid intervention for vascular  Cleared at low to intermediate risk from CV standpoint based off of testing    Acute CVA (cerebrovascular accident)  Per neuro    Essential hypertension  Currently stable on medicines    CKD (chronic kidney disease) stage 3, GFR 30-59 ml/min  Currently stable    Abdominal aortic aneurysm            VTE Risk Mitigation (From admission, onward)        Ordered     IP VTE HIGH RISK PATIENT  Once      04/02/19 1898          Thank you for your consult. I will sign off. Please contact us if you have any additional questions.    Taiwo Hoyt MD  Cardiology   [facility]

## 2019-04-03 NOTE — PROGRESS NOTES
[facility] Hospital Medicine  Progress Note    Patient Name: Cleopatra Lozada  MRN: [id number]  Patient Class: IP- Inpatient   Admission Date: 4/2/2019  Length of Stay: 0 days  Attending Physician: Josi Kelly MD  Primary Care Provider: Latesha Dias MD        Subjective:     Principal Problem:Acute CVA (cerebrovascular accident)    HPI:  Mr. Lozada is a 79 yo female with significant history for hypertension, AAA, diastolic dysfunction, CKD stage 3 and hx of CVA in 2014 who comes to the hospital for left sides weakness and numbness since Michael 3/31/19. Patient woke up Sunday night with left sided numbness/weakness. Patient unable to ambulates yesterday but now able to ambulate with unsteady gait today. Patient drove self to hospital. Reports symptoms are improving but not back to baseline. Denies headaches, dizziness, change in vision or change in speech. Continues to take ASA but off plavix a year ago. Does not have home BP.     BP on admit 160/115 improved without intervention 111's/50-60's. EKG NSR with first degree AVB. CXR clear. Neurology consult in ED. CTA head neck pending.     Hospital Course:  Admitted for acute stroke. MRI showing R corona radiata with extension into posterior limb of right internal capsule. 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 TTE pending. On telemetry- NSR. PT, OT, Speech evaluations pending. On asa, plavix, and statin. Holding BP meds for permissive HTN. Neurology consulted. Vascular consulted and recommend cardiac clearance for R CEA. Stress test pending.      Interval History: Some L leg weakness. No other complaints.     Review of Systems   Constitutional: Negative for chills and fever.   Respiratory: Negative for cough, shortness of breath and wheezing.    Cardiovascular: Negative for chest pain, palpitations and leg swelling.   Gastrointestinal: Negative for abdominal pain, constipation, diarrhea, nausea and vomiting.    Genitourinary: Negative for difficulty urinating.   Skin: Negative for rash and wound.   Neurological: Positive for weakness. Negative for dizziness, syncope, speech difficulty, light-headedness, numbness and headaches.   Hematological: Negative for adenopathy.     Objective:     Vital Signs (Most Recent):  Temp: 98.1 °F (36.7 °C) (04/03/19 0716)  Pulse: 82 (04/03/19 0908)  Resp: 19 (04/03/19 0716)  BP: (!) 108/59 (04/03/19 0908)  SpO2: (!) 91 % (04/03/19 0716) Vital Signs (24h Range):  Temp:  [98 °F (36.7 °C)-98.4 °F (36.9 °C)] 98.1 °F (36.7 °C)  Pulse:  [64-82] 82  Resp:  [16-28] 19  SpO2:  [91 %-98 %] 91 %  BP: (108-160)/() 108/59     Weight: 53.1 kg (117 lb)  Body mass index is 23.63 kg/m².    Intake/Output Summary (Last 24 hours) at 4/3/2019 1332  Last data filed at 4/2/2019 2158  Gross per 24 hour   Intake 360 ml   Output --   Net 360 ml      Physical Exam   Constitutional: She is oriented to person, place, and time. She appears well-developed and well-nourished. No distress.   HENT:   Head: Normocephalic and atraumatic.   Nose: Nose normal.   Mouth/Throat: Oropharynx is clear and moist.   Eyes: Pupils are equal, round, and reactive to light. Conjunctivae and EOM are normal. No scleral icterus.   Cardiovascular: Normal rate, regular rhythm and normal heart sounds. Exam reveals no gallop and no friction rub.   No murmur heard.  Pulmonary/Chest: Effort normal and breath sounds normal. No stridor. No respiratory distress. She has no wheezes. She has no rales.   Abdominal: Soft. Bowel sounds are normal. She exhibits no distension and no mass. There is no tenderness. There is no guarding.   Musculoskeletal: She exhibits no edema.   Neurological: She is alert and oriented to person, place, and time. No cranial nerve deficit or sensory deficit. She exhibits abnormal muscle tone (left leg weakness).   Skin: Skin is warm and dry. No rash noted. She is not diaphoretic. No erythema.   Nursing note and vitals  reviewed.      Significant Labs: All pertinent labs within the past 24 hours have been reviewed.    Significant Imaging: I have reviewed and interpreted all pertinent imaging results/findings within the past 24 hours.    Assessment/Plan:      * Acute CVA (cerebrovascular accident)  Patient with history of CVA 2014 . Takes ASA but off plavix 1 year ago. Symptoms concerning for acute stroke. Patient presents with left sided weakness and numbness since Sunday night 3/31/19. Symptoms are improving.   MRI confirms stroke  Neurology consulted- on asa and plavix. Started statin.  CTA shows bilateral CCA stenosis- Vascular consulted. Cardiac clearance for R CEA. Carotid US ordered.   TTE pending  PT, OT, Speech  Permissive HTN  Monitor on tele      CKD (chronic kidney disease) stage 3, GFR 30-59 ml/min  Cr 1.2 on admit, stable. Avoid nephrotoxins.       Essential hypertension  Currently controlled. Allow permissive HTN for now.       Abdominal aortic aneurysm  Hx of endovascular stent graft placement. Encourage smoking cessation. No back or abdominal pain at this time. Stable.         VTE Risk Mitigation (From admission, onward)        Ordered     IP VTE HIGH RISK PATIENT  Once      04/02/19 9344              Josi Kelly MD  Department of Hospital Medicine   [facility]

## 2019-04-03 NOTE — NURSING
Report received from Nguyen DING. Pt has not c/o pain, or distress noted. Safety precautions maintain.

## 2019-04-03 NOTE — PLAN OF CARE
"TN met with patient at bedside to complete discharge needs assessment. TN explained duties of case management to patient.  TN reviewed and provided  "[facility] Packet", "Discharge Planning Begins on Admission" and discussed "Help at Home". Patient stated she lives at home alone however her friend Carissa will assist her as needed. Patient stated she will discharge to her friend's home if surgery is needed. TN also discussed her responsibilities to manage her health at home. Patient was informed to leave folder at bedside during hospital stay. Contact information added to white board.    Patient Preferred Pharmacy:   [facility] [address] [address]  Phone: [phone] Fax: [phone]    Appointment Time Preference: 11:00am       04/03/19 1522   Discharge Assessment   Assessment Type Discharge Planning Assessment   Assessment information obtained from? Patient   Prior to hospitilization cognitive status: Alert/Oriented   Prior to hospitalization functional status: Independent   Current cognitive status: Alert/Oriented   Current Functional Status: Independent   Lives With alone   Able to Return to Prior Arrangements yes   Is patient able to care for self after discharge? Unable to determine at this time (comments)   Who are your caregiver(s) and their phone number(s)? Carissa- friend @ [phone]   Patient's perception of discharge disposition home or selfcare   Readmission Within the Last 30 Days no previous admission in last 30 days   Patient currently being followed by outpatient case management? No   Patient currently receives any other outside agency services? No   Equipment Currently Used at Home none   Do you have any problems affording any of your prescribed medications? No   Is the patient taking medications as prescribed? yes   Does the patient have transportation home? Yes   Transportation Anticipated family or friend will provide   Does " the patient receive services at the [facility]? No   Discharge Plan A Home;Home with family   Discharge Plan B   (TBD)   Patient/Family in Agreement with Plan yes

## 2019-04-03 NOTE — SUBJECTIVE & OBJECTIVE
Interval History: Some L leg weakness. No other complaints.     Review of Systems   Constitutional: Negative for chills and fever.   Respiratory: Negative for cough, shortness of breath and wheezing.    Cardiovascular: Negative for chest pain, palpitations and leg swelling.   Gastrointestinal: Negative for abdominal pain, constipation, diarrhea, nausea and vomiting.   Genitourinary: Negative for difficulty urinating.   Skin: Negative for rash and wound.   Neurological: Positive for weakness. Negative for dizziness, syncope, speech difficulty, light-headedness, numbness and headaches.   Hematological: Negative for adenopathy.     Objective:     Vital Signs (Most Recent):  Temp: 98.1 °F (36.7 °C) (04/03/19 0716)  Pulse: 82 (04/03/19 0908)  Resp: 19 (04/03/19 0716)  BP: (!) 108/59 (04/03/19 0908)  SpO2: (!) 91 % (04/03/19 0716) Vital Signs (24h Range):  Temp:  [98 °F (36.7 °C)-98.4 °F (36.9 °C)] 98.1 °F (36.7 °C)  Pulse:  [64-82] 82  Resp:  [16-28] 19  SpO2:  [91 %-98 %] 91 %  BP: (108-160)/() 108/59     Weight: 53.1 kg (117 lb)  Body mass index is 23.63 kg/m².    Intake/Output Summary (Last 24 hours) at 4/3/2019 1332  Last data filed at 4/2/2019 2158  Gross per 24 hour   Intake 360 ml   Output --   Net 360 ml      Physical Exam   Constitutional: She is oriented to person, place, and time. She appears well-developed and well-nourished. No distress.   HENT:   Head: Normocephalic and atraumatic.   Nose: Nose normal.   Mouth/Throat: Oropharynx is clear and moist.   Eyes: Pupils are equal, round, and reactive to light. Conjunctivae and EOM are normal. No scleral icterus.   Cardiovascular: Normal rate, regular rhythm and normal heart sounds. Exam reveals no gallop and no friction rub.   No murmur heard.  Pulmonary/Chest: Effort normal and breath sounds normal. No stridor. No respiratory distress. She has no wheezes. She has no rales.   Abdominal: Soft. Bowel sounds are normal. She exhibits no distension and no mass.  There is no tenderness. There is no guarding.   Musculoskeletal: She exhibits no edema.   Neurological: She is alert and oriented to person, place, and time. No cranial nerve deficit or sensory deficit. She exhibits abnormal muscle tone (left leg weakness).   Skin: Skin is warm and dry. No rash noted. She is not diaphoretic. No erythema.   Nursing note and vitals reviewed.      Significant Labs: All pertinent labs within the past 24 hours have been reviewed.    Significant Imaging: I have reviewed and interpreted all pertinent imaging results/findings within the past 24 hours.

## 2019-04-03 NOTE — PLAN OF CARE
Problem: Occupational Therapy Goal  Goal: Occupational Therapy Goal  Outcome: Outcome(s) achieved Date Met: 04/03/19  OT eval is complete. The patient is able to perform self care and functional mobility with SBA. The patient was able to amb to the bathroom and perform toilet transfer with (S). The patient states she is at her functional baseline. No OT is recommended in the acute hospital.    Comments: The patient will benefit from a  OT safety evaluation.

## 2019-04-03 NOTE — PLAN OF CARE
Problem: SLP Goal  Goal: SLP Goal  ST. Pt will participate in swallow eval to determine least restrictive diet with no over s/s of aspiration.-GOAL MET 4/3  Outcome: Outcome(s) achieved Date Met: 04/03/19  4/3 ST:  Swallow eval completed. Swallow is WFL. No overt s/s of aspiration. Rec: con't current diet . No follow up is necessary at this time. Dawn Davis CCC-SLP

## 2019-04-03 NOTE — ASSESSMENT & PLAN NOTE
-R corona radiata and internal capsule acute infarct with L sided weakness/paresthesias and moderate to severe R ICA stenosis on CTA - rec carotid US  -If stenosis >50% - rec R CEA; pt declining surgery; risks of increased stroke rate with medical mgmt d/w pt who states she does not desire surgery  -Agree with ASA, Plavix - rec increase to 80 mg daily of Atorvastatin  -Cardiology consult for preoperative risk stratification

## 2019-04-03 NOTE — SUBJECTIVE & OBJECTIVE
Past Medical History:   Diagnosis Date    Aneurysm     abdominal, repaired 11/15/2011    Cigarette smoker     patient reports buying loose tobacco and rolling with a machine, at home    Hypertension     TIA (transient ischemic attack)     patient states 3rd episode       Past Surgical History:   Procedure Laterality Date    ABDOMINAL AORTIC ANEURYSM REPAIR  11/15/2011    TONSILLECTOMY         Review of patient's allergies indicates:   Allergen Reactions    Tetanus vaccines and toxoid Rash       No current facility-administered medications on file prior to encounter.      Current Outpatient Medications on File Prior to Encounter   Medication Sig    amlodipine (NORVASC) 10 MG tablet Take 50 mg by mouth once daily.    aspirin 325 MG tablet Take 325 mg by mouth once daily.    metoprolol succinate (TOPROL-XL) 25 MG 24 hr tablet Take 10 mg by mouth once daily.    valsartan (DIOVAN) 160 MG tablet Take 160 mg by mouth once daily.    clopidogrel (PLAVIX) 75 mg tablet Take 1 tablet (75 mg total) by mouth once daily.     Family History     Problem Relation (Age of Onset)    Cancer Father    Heart disease Mother        Tobacco Use    Smoking status: Current Every Day Smoker     Packs/day: 0.50     Types: Cigarettes    Smokeless tobacco: Never Used    Tobacco comment: reports using loose tobacco & rolling herself, by machine   Substance and Sexual Activity    Alcohol use: No    Drug use: No    Sexual activity: Not on file     Review of Systems   Unable to perform ROS: acuity of condition     Objective:     Vital Signs (Most Recent):  Temp: 98.1 °F (36.7 °C) (04/03/19 0716)  Pulse: 82 (04/03/19 0908)  Resp: 19 (04/03/19 0716)  BP: (!) 108/59 (04/03/19 0908)  SpO2: (!) 91 % (04/03/19 0716) Vital Signs (24h Range):  Temp:  [98 °F (36.7 °C)-98.4 °F (36.9 °C)] 98.1 °F (36.7 °C)  Pulse:  [64-82] 82  Resp:  [16-28] 19  SpO2:  [91 %-98 %] 91 %  BP: (108-160)/() 108/59     Weight: 53.1 kg (117 lb)  Body mass index  is 23.63 kg/m².    SpO2: (!) 91 %  O2 Device (Oxygen Therapy): room air      Intake/Output Summary (Last 24 hours) at 4/3/2019 1509  Last data filed at 4/2/2019 2158  Gross per 24 hour   Intake 360 ml   Output --   Net 360 ml       Lines/Drains/Airways     Peripheral Intravenous Line                 Peripheral IV - Single Lumen 04/02/19 1530 Right Antecubital less than 1 day                Physical Exam   Constitutional: She appears well-developed and well-nourished.   HENT:   Head: Normocephalic and atraumatic.   Eyes: Pupils are equal, round, and reactive to light. Conjunctivae and EOM are normal.   Neck: Normal range of motion. Neck supple. No thyromegaly present.   Cardiovascular: Normal rate and regular rhythm.   Murmur heard.   Harsh midsystolic murmur is present with a grade of 2/6 at the upper right sternal border radiating to the neck.  Pulmonary/Chest: Effort normal and breath sounds normal. No respiratory distress.   Abdominal: Soft. Bowel sounds are normal.   Musculoskeletal: She exhibits no edema.   Neurological: She is alert.   Skin: Skin is warm and dry.   Psychiatric: She has a normal mood and affect. Her behavior is normal.       Significant Labs:   CMP   Recent Labs   Lab 04/02/19  1530      K 3.8      CO2 24   GLU 72   BUN 12   CREATININE 1.2   CALCIUM 9.2   PROT 7.8   ALBUMIN 3.6   BILITOT 0.4   ALKPHOS 88   AST 19   ALT 7*   ANIONGAP 9   ESTGFRAFRICA 50*   EGFRNONAA 43*   , CBC   Recent Labs   Lab 04/02/19  1530 04/02/19  1557   WBC 7.58  --    HGB 12.7  --    HCT 41.2 37     --    , INR   Recent Labs   Lab 04/02/19  1530   INR 1.0   , Lipid Panel   Recent Labs   Lab 04/02/19  1530   CHOL 141   HDL 26*   LDLCALC 77.4   TRIG 188*   CHOLHDL 18.4*    and Troponin   Recent Labs   Lab 04/02/19  1530   TROPONINI <0.006       Significant Imaging: Echocardiogram:   Transthoracic echo (TTE) complete (Cupid Only):   Results for orders placed or performed during the hospital encounter of  04/02/19   Transthoracic echo (TTE) 2D with Color Flow   Result Value Ref Range    BSA 1.49 m2    LA WIDTH 3.49 cm    AORTIC VALVE CUSP SEPERATION 1.28 cm    PV PEAK VELOCITY 1.01 cm/s    LVIDD 3.33 (A) 3.5 - 6.0 cm    IVS 1.11 (A) 0.6 - 1.1 cm    PW 1.11 (A) 0.6 - 1.1 cm    Ao root annulus 2.49 cm    LVIDS 2.46 2.1 - 4.0 cm    FS 26 28 - 44 %    LA volume 37.58 cm3    Sinus 3.04 cm    STJ 2.24 cm    Ascending aorta 3.94 cm    LV mass 112.11 g    LA size 3.10 cm    RVDD 2.14 cm    TAPSE 1.73 cm    RV S' 10.60 m/s    Left Ventricle Relative Wall Thickness 0.67 cm    AV mean gradient 11.75 mmHg    AV valve area 1.71 cm2    AV Velocity Ratio 0.53     AV index (prosthetic) 0.53     E/A ratio 0.52     E wave decelartion time 408.55 msec    IVRT 0.07 msec    Pulm vein S/D ratio 2.96     LVOT diameter 2.02 cm    LVOT area 3.20 cm2    LVOT peak abimael [id number] m/s    LVOT peak VTI 22.49 cm    Ao peak abimael 2.33 m/s    Ao VTI 42.05 cm    LVOT stroke volume 72.04 cm3    AV peak gradient 21.72 mmHg    MV Peak E Baimael 0.71 m/s    TR Max Abimael 2.35 m/s    MV Peak A Abimael 1.37 m/s    PV Peak S Abimael 0.83 m/s    PV Peak D Abimael 0.28 m/s    LV Systolic Volume 21.47 mL    LV Systolic Volume Index 14.6 mL/m2    LV Diastolic Volume 45.27 mL    LV Diastolic Volume Index 30.83 mL/m2    LA Volume Index 25.6 mL/m2    LV Mass Index 76.4 g/m2    RA Major Axis 5.25 cm    Left Atrium Minor Axis 4.76 cm    Left Atrium Major Axis 3.58 cm    Triscuspid Valve Regurgitation Peak Gradient 22.09 mmHg    RA Width 2.23 cm    Right Atrial Pressure (from IVC) 3 mmHg    TV rest pulmonary artery pressure 25 mmHg     · Normal left ventricular systolic function. The estimated ejection fraction is 55%  · Concentric left ventricular remodeling.  · Mild aortic valve stenosis.  · Aortic valve area is 1.71 cm2; peak velocity is 2.33 m/s; mean gradient is 11.75 mmHg.    NST:  · Normal myocardial perfusion scan  · Arrhythmias during stress: PACs.ISOLATED PACs  · There was no  ST segment deviation noted during stress.  · The patient reported light headedness and abdominal pain during the stress test.  · The perfusion scan is free of evidence from myocardial ischemia or injury.  · LVEF post-stress is 78%  · The EKG portion of this study is negative for myocardial ischemia.     * all labs reviewed an echocardiogram +NST personally interpreted

## 2019-04-03 NOTE — HOSPITAL COURSE
Admitted for acute stroke. MRI showing R corona radiata with extension into posterior limb of right internal capsule. 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 Still has some L leg weakness. TTE EF 55%, mild AS. On telemetry- NSR. PT, OT recommend home health. Speech recommends regular diet. On asa, plavix, and atorvastatin. Initially held BP meds for permissive HTN. Neurology consulted- started asa and plavix. Started metoprolol and changed home valsartan to losartan. Encouraged smoking cessation. Vascular consulted and recommend cardiac clearance for R CEA. Stress test with no ischemia; cleared at low to intermediate risk from CV standpoint based off of testing. Plan for outpatient R CEA on 4/10. Stable for discharge to home with home health.

## 2019-04-03 NOTE — CONSULTS
[facility]  Vascular Surgery  Consult Note    Inpatient consult to Vascular Surgery  Consult performed by: Pietro Steward MD  Consult ordered by: Michelle Castillo NP  Reason for consult: Carotid stenosis        Subjective:     Chief Complaint/Reason for Admission: Carotid stenosis    History of Present Illness:             Pitero Steward MD [facility]                         04/03/2019    HPI:  Cleopatra Lozada is a 78 y.o. female with   Patient Active Problem List   Diagnosis    Abdominal aortic aneurysm    Acute CVA (cerebrovascular accident)    Essential hypertension    CKD (chronic kidney disease) stage 3, GFR 30-59 ml/min    Transient ischemic attack    being managed by PCP and specialists who is here today for evaluation of CVA.  S/p L hemispheric CVA 2014 with <50% stenosis bilateral ICA with pt declining further mgmt at that time.  S/p EVAR 2011 without follow up in last few yrs.  Pt presented 4/2/19 with c/o LUE weakness.  MRI brain showed R corona radiata and internal capsule acute infarct.  CTA neck shows R ICA stenosis (near occlusion on Radiology report), L ICA with <50% stenosis.  Vascular Surgery consulted for further mgmt.  Pt states 3/31/19 while in bed she developed LUE and LLE weakness and tingling.  This improved but did not resolve until 4/3/19.  She had trouble ambulating to the restroom that night due to weakness.  She also had L sided body tingling.    no MI  yes Stroke  Tobacco use: 1/2 ppd x 60 yrs    Past Medical History:   Diagnosis Date    Aneurysm     abdominal, repaired 11/15/2011    Cigarette smoker     patient reports buying loose tobacco and rolling with a machine, at home    Hypertension     TIA (transient ischemic attack)     patient states 3rd episode     Past Surgical History:   Procedure Laterality Date    ABDOMINAL AORTIC ANEURYSM REPAIR  11/15/2011    TONSILLECTOMY       Family History    Problem Relation Age of Onset    Heart disease Mother     Cancer Father      Social History     Socioeconomic History    Marital status: Single     Spouse name: Not on file    Number of children: Not on file    Years of education: Not on file    Highest education level: Not on file   Occupational History    Not on file   Social Needs    Financial resource strain: Not on file    Food insecurity:     Worry: Not on file     Inability: Not on file    Transportation needs:     Medical: Not on file     Non-medical: Not on file   Tobacco Use    Smoking status: Current Every Day Smoker     Packs/day: 0.50     Types: Cigarettes    Smokeless tobacco: Never Used    Tobacco comment: reports using loose tobacco & rolling herself, by machine   Substance and Sexual Activity    Alcohol use: No    Drug use: No    Sexual activity: Not on file   Lifestyle    Physical activity:     Days per week: Not on file     Minutes per session: Not on file    Stress: Not on file   Relationships    Social connections:     Talks on phone: Not on file     Gets together: Not on file     Attends [employer] service: Not on file     Active member of club or organization: Not on file     Attends meetings of clubs or organizations: Not on file     Relationship status: Not on file   Other Topics Concern    Not on file   Social History Narrative    Not on file       Current Facility-Administered Medications:     atorvastatin tablet 40 mg, 40 mg, Oral, Daily, Josi Kelly MD    clopidogrel tablet 75 mg, 75 mg, Oral, Daily, Gerardo Jay MD    polyethylene glycol packet 17 g, 17 g, Oral, Daily, Gerardo Jay MD    promethazine (PHENERGAN) 6.25 mg in dextrose 5 % 50 mL IVPB, 6.25 mg, Intravenous, Q6H PRN, Gerardo Jay MD    sodium chloride 0.9% flush 10 mL, 10 mL, Intravenous, PRN, Gerardo Jay MD      Medications Prior to Admission   Medication Sig Dispense Refill Last Dose    amlodipine (NORVASC) 10 MG tablet  Take 50 mg by mouth once daily.   4/2/2019    aspirin 325 MG tablet Take 325 mg by mouth once daily.   4/2/2019 at Unknown time    metoprolol succinate (TOPROL-XL) 25 MG 24 hr tablet Take 10 mg by mouth once daily.   4/2/2019    valsartan (DIOVAN) 160 MG tablet Take 160 mg by mouth once daily.   4/2/2019    clopidogrel (PLAVIX) 75 mg tablet Take 1 tablet (75 mg total) by mouth once daily. 30 tablet 0 Unknown at Unknown time       Review of patient's allergies indicates:   Allergen Reactions    Tetanus vaccines and toxoid Rash       Past Medical History:   Diagnosis Date    Aneurysm     abdominal, repaired 11/15/2011    Cigarette smoker     patient reports buying loose tobacco and rolling with a machine, at home    Hypertension     TIA (transient ischemic attack)     patient states 3rd episode     Past Surgical History:   Procedure Laterality Date    ABDOMINAL AORTIC ANEURYSM REPAIR  11/15/2011    TONSILLECTOMY       Family History     Problem Relation (Age of Onset)    Cancer Father    Heart disease Mother        Tobacco Use    Smoking status: Current Every Day Smoker     Packs/day: 0.50     Types: Cigarettes    Smokeless tobacco: Never Used    Tobacco comment: reports using loose tobacco & rolling herself, by machine   Substance and Sexual Activity    Alcohol use: No    Drug use: No    Sexual activity: Not on file     Review of Systems   Constitutional: Negative for chills and fever.   HENT: Negative for congestion and sore throat.    Eyes: Negative for visual disturbance.   Respiratory: Negative for shortness of breath and wheezing.    Cardiovascular: Negative for chest pain and palpitations.   Gastrointestinal: Negative for abdominal distention, abdominal pain, constipation, diarrhea, nausea and vomiting.   Endocrine: Negative for cold intolerance.   Genitourinary: Negative for dysuria and hematuria.   Musculoskeletal: Negative for joint swelling.   Skin: Negative for color change, pallor, rash  and wound.   Allergic/Immunologic: Negative for immunocompromised state.   Neurological: Negative for seizures, weakness and headaches.   Hematological: Does not bruise/bleed easily.   Psychiatric/Behavioral: Negative for agitation. The patient is not nervous/anxious.      Objective:     Vital Signs (Most Recent):  Temp: 98.1 °F (36.7 °C) (04/03/19 0716)  Pulse: 69 (04/03/19 0716)  Resp: 19 (04/03/19 0716)  BP: (!) 112/56 (04/03/19 0716)  SpO2: (!) 91 % (04/03/19 0716) Vital Signs (24h Range):  Temp:  [98 °F (36.7 °C)-98.4 °F (36.9 °C)] 98.1 °F (36.7 °C)  Pulse:  [64-77] 69  Resp:  [16-28] 19  SpO2:  [91 %-98 %] 91 %  BP: (108-160)/() 112/56     Weight: 53.5 kg (117 lb 15.1 oz)  Body mass index is 23.82 kg/m².    Physical Exam   Constitutional: She appears well-developed and well-nourished. No distress.   HENT:   Head: Normocephalic and atraumatic.   Eyes: Conjunctivae and EOM are normal.   Neck: Neck supple.   Cardiovascular: Normal rate.   Pulses:       Radial pulses are 2+ on the right side, and 2+ on the left side.        Posterior tibial pulses are 1+ on the right side, and 1+ on the left side.   Pulmonary/Chest: Effort normal. No respiratory distress.   Abdominal: Soft. She exhibits no distension and no mass. There is no tenderness. There is no rebound and no guarding. No hernia.   Musculoskeletal: Normal range of motion. She exhibits no edema, tenderness or deformity.   Neurological: She is alert. No cranial nerve deficit or sensory deficit.   Skin: Skin is warm and dry. Capillary refill takes less than 2 seconds. No rash noted. She is not diaphoretic. No pallor.   Psychiatric: She has a normal mood and affect.   Vitals reviewed.      Significant Labs:  All pertinent labs from the last 24 hours have been reviewed.    Significant Diagnostics:  I have reviewed all pertinent imaging results/findings within the past 24 hours.    Assessment/Plan:     * Acute CVA (cerebrovascular accident)  -R corona radiata  and internal capsule acute infarct with L sided weakness/paresthesias and moderate to severe R ICA stenosis on CTA - rec carotid US  -If stenosis >50% - rec R CEA; pt declining surgery; risks of increased stroke rate with medical mgmt d/w pt who states she does not desire surgery  -Agree with ASA, Plavix - rec increase to 80 mg daily of Atorvastatin  -Cardiology consult for preoperative risk stratification    Abdominal aortic aneurysm  -s/p EVAR 2011 without appropriate follow up surveillance - rec CTA A/P and outpatient follow up  -No abd or back pain     Follow up Neurology recs re: distribution of CVA    Thank you for your consult. I will follow-up with patient. Please contact us if you have any additional questions.    Pietro Steward MD  Vascular Surgery  [facility]

## 2019-04-04 VITALS
HEART RATE: 95 BPM | SYSTOLIC BLOOD PRESSURE: 130 MMHG | TEMPERATURE: 98 F | WEIGHT: 117 LBS | OXYGEN SATURATION: 93 % | DIASTOLIC BLOOD PRESSURE: 75 MMHG | HEIGHT: 59 IN | RESPIRATION RATE: 17 BRPM | BODY MASS INDEX: 23.59 KG/M2

## 2019-04-04 PROCEDURE — 94761 N-INVAS EAR/PLS OXIMETRY MLT: CPT

## 2019-04-04 PROCEDURE — 99232 PR SUBSEQUENT HOSPITAL CARE,LEVL II: ICD-10-PCS | Mod: ,,, | Performed by: PSYCHIATRY & NEUROLOGY

## 2019-04-04 PROCEDURE — 99232 SBSQ HOSP IP/OBS MODERATE 35: CPT | Mod: ,,, | Performed by: SURGERY

## 2019-04-04 PROCEDURE — 25000003 PHARM REV CODE 250: Performed by: HOSPITALIST

## 2019-04-04 PROCEDURE — 99232 PR SUBSEQUENT HOSPITAL CARE,LEVL II: ICD-10-PCS | Mod: ,,, | Performed by: SURGERY

## 2019-04-04 PROCEDURE — 99232 SBSQ HOSP IP/OBS MODERATE 35: CPT | Mod: ,,, | Performed by: PSYCHIATRY & NEUROLOGY

## 2019-04-04 PROCEDURE — 25000003 PHARM REV CODE 250: Performed by: EMERGENCY MEDICINE

## 2019-04-04 RX ORDER — ASPIRIN 81 MG/1
81 TABLET ORAL DAILY
Refills: 0 | COMMUNITY
Start: 2019-04-05 | End: 2021-05-19 | Stop reason: CLARIF

## 2019-04-04 RX ORDER — LOSARTAN POTASSIUM 50 MG/1
50 TABLET ORAL DAILY
Qty: 90 TABLET | Refills: 3 | Status: SHIPPED | OUTPATIENT
Start: 2019-04-04 | End: 2021-05-19

## 2019-04-04 RX ORDER — LOSARTAN POTASSIUM 50 MG/1
50 TABLET ORAL DAILY
Qty: 90 TABLET | Refills: 3 | Status: SHIPPED | OUTPATIENT
Start: 2019-04-04 | End: 2019-04-04 | Stop reason: SDUPTHER

## 2019-04-04 RX ORDER — METOPROLOL SUCCINATE 25 MG/1
25 TABLET, EXTENDED RELEASE ORAL DAILY
Status: DISCONTINUED | OUTPATIENT
Start: 2019-04-04 | End: 2019-04-04 | Stop reason: HOSPADM

## 2019-04-04 RX ORDER — METOPROLOL SUCCINATE 25 MG/1
25 TABLET, EXTENDED RELEASE ORAL DAILY
Qty: 30 TABLET | Refills: 2 | Status: SHIPPED | OUTPATIENT
Start: 2019-04-04 | End: 2021-05-19

## 2019-04-04 RX ORDER — LOSARTAN POTASSIUM 25 MG/1
25 TABLET ORAL DAILY
Status: DISCONTINUED | OUTPATIENT
Start: 2019-04-04 | End: 2019-04-04 | Stop reason: HOSPADM

## 2019-04-04 RX ADMIN — ASPIRIN 81 MG: 81 TABLET, COATED ORAL at 09:04

## 2019-04-04 RX ADMIN — BENZONATATE 100 MG: 100 CAPSULE ORAL at 09:04

## 2019-04-04 RX ADMIN — ATORVASTATIN CALCIUM 80 MG: 40 TABLET, FILM COATED ORAL at 09:04

## 2019-04-04 RX ADMIN — CLOPIDOGREL BISULFATE 75 MG: 75 TABLET ORAL at 09:04

## 2019-04-04 RX ADMIN — BENZONATATE 100 MG: 100 CAPSULE ORAL at 12:04

## 2019-04-04 RX ADMIN — GUAIFENESIN AND DEXTROMETHORPHAN HYDROBROMIDE 1 TABLET: 600; 30 TABLET, EXTENDED RELEASE ORAL at 09:04

## 2019-04-04 NOTE — NURSING
Discharge Preparation:   Note that patient awake, alert and fully oriented with son at bedside;   Patient put on clothing and is ready for discharge;     Case management coordination complete;     IV and telemetry monitor removed; no bleeding observed from IV site;  Patient denies pain;     Reviewed discharge plan with family and they are in agreement with same;     Will contact Hospital Transport for w/c to parking garage;     Will continue to f/u;

## 2019-04-04 NOTE — PROGRESS NOTES
[facility]  Neurology  Progress Note    Patient Name: Cleopatra Lozada  MRN: [id number]  Admission Date: 4/2/2019  Hospital Length of Stay: 1 days  Code Status: Full Code   Attending Provider: Josi Kelly MD  Primary Care Physician: Latesha Dias MD   Principal Problem:Preop cardiovascular exam    Subjective:     Interval History: 77 y/o male with medical Hx as listed below comes to ED after complaining of numbness and weakness on left side for two days. Pt decided no to come to ED for medical evaluation but upon no resolution pt presented to our ER. Ms. Lozada denies visual or speech disturbances, vertigo, right sided involvement. Ms. Lozada reprots an episode of bilateral loss of vision several years ago. No aggravating or alleviating factors.    -4/4/19: Pt with no new symptoms. Still feel some heaviness on left side.    Current Neurological Medications:     Current Facility-Administered Medications   Medication Dose Route Frequency Provider Last Rate Last Dose    aspirin EC tablet 81 mg  81 mg Oral Daily Josi Kelly MD   81 mg at 04/04/19 0905    atorvastatin tablet 80 mg  80 mg Oral Daily Josi Kelly MD   80 mg at 04/04/19 0905    benzonatate capsule 100 mg  100 mg Oral TID PRN Josi Kelly MD   100 mg at 04/04/19 0904    clopidogrel tablet 75 mg  75 mg Oral Daily Gerardo Jay MD   75 mg at 04/04/19 0905    dextromethorphan-guaifenesin  mg per 12 hr tablet 1 tablet  1 tablet Oral BID Josi Kelly MD   1 tablet at 04/04/19 0905    losartan tablet 25 mg  25 mg Oral Daily Josi Kelly MD        metoprolol succinate (TOPROL-XL) 24 hr tablet 25 mg  25 mg Oral Daily Josi Kelly MD        polyethylene glycol packet 17 g  17 g Oral Daily Gerardo Jay MD   17 g at 04/03/19 0829    promethazine (PHENERGAN) 6.25 mg in dextrose 5 % 50 mL IVPB  6.25 mg Intravenous Q6H PRN Gerardo Jay MD        ramelteon tablet 8 mg  8 mg Oral  Nightly PRN Josi Kelly MD        senna-docusate 8.6-50 mg per tablet 1 tablet  1 tablet Oral Daily PRN Josi Kelly MD        sodium chloride 0.9% flush 10 mL  10 mL Intravenous PRN Gerardo Jay MD           Review of Systems   Constitutional: Negative for fever.   HENT: Negative for trouble swallowing.    Eyes: Negative for photophobia.   Respiratory: Negative for shortness of breath.    Cardiovascular: Negative for chest pain.   Gastrointestinal: Negative for abdominal pain.   Genitourinary: Negative for dysuria.   Musculoskeletal: Negative for back pain.   Neurological: Negative for headaches.        Objective:     Vital Signs (Most Recent):  Temp: 97.7 °F (36.5 °C) (04/04/19 1145)  Pulse: 95 (04/04/19 1145)  Resp: 17 (04/04/19 1145)  BP: 130/75 (04/04/19 1145)  SpO2: (!) 93 % (04/04/19 1145) Vital Signs (24h Range):  Temp:  [97.7 °F (36.5 °C)-98.7 °F (37.1 °C)] 97.7 °F (36.5 °C)  Pulse:  [66-99] 95  Resp:  [17-19] 17  SpO2:  [92 %-95 %] 93 %  BP: (110-138)/(60-81) 130/75     Weight: 53.1 kg (117 lb)  Body mass index is 23.63 kg/m².    Physical Exam  Physical Exam   Constitutional: She is oriented to person, place, and time. No distress.   Eyes: Right eye exhibits no discharge. Left eye exhibits no discharge.   Neck: Neck supple.   Cardiovascular: Regular rhythm.   Pulmonary/Chest: Breath sounds normal.   Abdominal: Bowel sounds are normal.   Musculoskeletal: She exhibits no edema.   Neurological: She is oriented to person, place, and time. She has normal strength. She has a normal Finger-Nose-Finger Test and a normal Heel to Shin Test.   Reflex Scores:       Tricep reflexes are 2+ on the right side and 2+ on the left side.       Bicep reflexes are 2+ on the right side and 2+ on the left side.       Brachioradialis reflexes are 2+ on the right side and 2+ on the left side.       Patellar reflexes are 2+ on the right side and 2+ on the left side.       Achilles reflexes are 2+ on the right side  and 2+ on the left side.  Skin: She is not diaphoretic.   Psychiatric: Her speech is normal.         NEUROLOGICAL EXAMINATION:      MENTAL STATUS   Oriented to person, place, and time.   Speech: speech is normal   Level of consciousness: alert     CRANIAL NERVES      CN III, IV, VI   Right pupil: Size: 2 mm. Shape: regular. Reactivity: brisk.   Left pupil: Size: 2 mm. Shape: regular. Reactivity: brisk.   Nystagmus: none   Ophthalmoparesis: none  Conjugate gaze: present     CN V   Right facial sensation deficit: none  Left facial sensation deficit: none     CN VII   Right facial weakness: none  Left facial weakness: none     CN IX, X   Palate: symmetric     CN XI   Right trapezius strength: normal  Left trapezius strength: normal     CN XII   Tongue deviation: none     MOTOR EXAM      Strength   Strength 5/5 throughout.         SENSORY EXAM   Right arm light touch: normal  Left arm light touch: normal  Right leg light touch: normal  Left leg light touch: normal     GAIT AND COORDINATION      Coordination   Finger to nose coordination: normal  Heel to shin coordination: normal       Decreased wilbur and length of stride   NIHSS: 0           Significant Labs:   CBC:   Recent Labs   Lab 04/02/19  1530 04/02/19  1557   WBC 7.58  --    HGB 12.7  --    HCT 41.2 37     --      CMP:   Recent Labs   Lab 04/02/19  1530   GLU 72      K 3.8      CO2 24   BUN 12   CREATININE 1.2   CALCIUM 9.2   PROT 7.8   ALBUMIN 3.6   BILITOT 0.4   ALKPHOS 88   AST 19   ALT 7*   ANIONGAP 9   EGFRNONAA 43*       Significant Imaging:  Carotid US  Impression       There is a moderate amount of atherosclerotic plaque within tortuous carotid arteries bilaterally.  However, no hemodynamically significant stenoses are appreciated.  Doppler findings suggest less than 50% diameter narrowing within the proximal internal carotid arteries bilaterally.    Normal antegrade flow maintained within both vertebral arteries.    Heterogeneous  atherosclerotic plaque identified within the left carotid bulb/proximal left internal carotid artery.      Electronically signed by: Shane Zapata MD  Date: 04/03/2019  Time: 13:02         Assessment and Plan:     77 y/o female with acute stroke     1. 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           Internal capsule irrigation comes from Yvette and LSA arteries which are small tributaries of MCA. 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 Interestingly carotid US showed otherwise           Pt is on dual antiplatelets and statin.           -No need for permissive HTN           -Vascular surgery follow up.        Active Diagnoses:    Diagnosis Date Noted POA    PRINCIPAL PROBLEM:  Preop cardiovascular exam [Z01.810] 04/03/2019 Not Applicable    Acute CVA (cerebrovascular accident) [I63.9] 04/02/2019 Yes    Essential hypertension [I10] 04/02/2019 Yes     Chronic    CKD (chronic kidney disease) stage 3, GFR 30-59 ml/min [N18.3] 04/02/2019 Yes     Chronic    Abdominal aortic aneurysm [I71.4] 03/12/2014 Yes     Chronic      Problems Resolved During this Admission:    Diagnosis Date Noted Date Resolved POA    Transient ischemic attack [G45.9] 04/02/2019 04/03/2019 Yes       VTE Risk Mitigation (From admission, onward)        Ordered     IP VTE HIGH RISK PATIENT  Once      04/02/19 1828          Lopez Bahena MD  Neurology  [facility]

## 2019-04-04 NOTE — PROGRESS NOTES
TN contacted patient to inform that PHN authorized [facility] for her home health services.

## 2019-04-04 NOTE — PLAN OF CARE
[facility]    HOME HEALTH ORDERS  FACE TO FACE ENCOUNTER    Patient Name: Cleopatra Lozada  YOB: 1940    PCP: Latesha Dias MD   PCP Address: [address] [place] [address] / [place] [address]  PCP Phone Number: [phone]  PCP Fax: [phone]    Encounter Date: 04/04/2019    Admit to Home Health    Diagnoses:  Active Hospital Problems    Diagnosis  POA    *Preop cardiovascular exam [Z01.810]  Not Applicable    Acute CVA (cerebrovascular accident) [I63.9]  Yes    Essential hypertension [I10]  Yes     Chronic    CKD (chronic kidney disease) stage 3, GFR 30-59 ml/min [N18.3]  Yes     Chronic    Abdominal aortic aneurysm [I71.4]  Yes     Chronic      Resolved Hospital Problems    Diagnosis Date Resolved POA    Transient ischemic attack [G45.9] 04/03/2019 Yes       No future appointments.        I have seen and examined this patient face to face today. My clinical findings that support the need for the home health skilled services and home bound status are the following:  Weakness/numbness causing balance and gait disturbance due to Stroke making it taxing to leave home.  Requiring assistive device to leave home due to unsteady gait caused by  Stroke.    Allergies:  Review of patient's allergies indicates:   Allergen Reactions    Tetanus vaccines and toxoid Rash       Diet: cardiac diet    Activities: activity as tolerated    Nursing:   SN to complete comprehensive assessment including routine vital signs. Instruct on disease process and s/s of complications to report to MD. Review/verify medication list sent home with the patient at time of discharge  and instruct patient/caregiver as needed. Frequency may be adjusted depending on start of care date.    Notify MD if SBP > 160 or < 90; DBP > 90 or < 50; HR > 120 or < 50; Temp > 101; Other:   SpO2<88%      CONSULTS:    Physical Therapy to evaluate and treat. Evaluate for home safety and equipment needs;  Establish/upgrade home exercise program. Perform / instruct on therapeutic exercises, gait training, transfer training, and Range of Motion.  Occupational Therapy to evaluate and treat. Evaluate home environment for safety and equipment needs. Perform/Instruct on transfers, ADL training, ROM, and therapeutic exercises.    MISCELLANEOUS CARE:  N/A    WOUND CARE ORDERS  n/a      Medications: Review discharge medications with patient and family and provide education.      Current Discharge Medication List      START taking these medications    Details   aspirin (ECOTRIN) 81 MG EC tablet Take 1 tablet (81 mg total) by mouth once daily.  Refills: 0      atorvastatin (LIPITOR) 80 MG tablet Take 1 tablet (80 mg total) by mouth once daily.  Qty: 90 tablet, Refills: 3      losartan (COZAAR) 50 MG tablet Take 1 tablet (50 mg total) by mouth once daily.  Qty: 90 tablet, Refills: 3         CONTINUE these medications which have CHANGED    Details   clopidogrel (PLAVIX) 75 mg tablet Take 1 tablet (75 mg total) by mouth once daily.  Qty: 30 tablet, Refills: 11      metoprolol succinate (TOPROL-XL) 25 MG 24 hr tablet Take 1 tablet (25 mg total) by mouth once daily.  Qty: 30 tablet, Refills: 2         STOP taking these medications       amlodipine (NORVASC) 10 MG tablet Comments:   Reason for Stopping:         aspirin 325 MG tablet Comments:   Reason for Stopping:         valsartan (DIOVAN) 160 MG tablet Comments:   Reason for Stopping:               I certify that this patient is confined to her home and needs intermittent skilled nursing care, physical therapy and occupational therapy.    Josi Kelly MD  04/04/2019 9:54 AM

## 2019-04-04 NOTE — PHYSICIAN QUERY
"PT Name: Cleopatra Lozada  MR #: [id number]  Physician Query Form -  Neurological Condition Clarification      CDS/: Karlie Yan               Contact information: [email or web address]      This form is a permanent document in the medical record.     Query Date: April 4, 2019    By submitting this query, we are merely seeking further clarification of documentation. Please utilize your independent clinical judgment when addressing the question(s) below.    The Medical record contains the following:   Indicators  Supporting Clinical Findings Location in Medical Record   x "Occlusion", "Stenosis" documented Pt has significant occlusive disease on both carotids left > right per CTA. Interestingly carotid US showed otherwise     Neurology note 4/4    x "TIA" or  "Stroke" documented Stroke: right corona/internal capsule stroke with resulting in left motor symptoms.           Internal capsule irrigation comes from Yvette and LSA arteries which are small tributaries of MCA     Neurology note 4/4     Radiology findings:      Medication:      Treatment:      Other:            Provider, please specify the diagnosis or diagnoses associated with above clinical findings.            Site:(Specify from each column)      [ x ] Right  [ x ] Middle [   ] Precerebral artery     [   ] Unspecified [   ] Carotid artery       [ x ] Cerebral artery           Cause:        [ x ] Embolism       [   ] Ischemic       [   ] Occulsion/Stenosis      [   ] Thrombosis       [   ] Other (Specify) ___      [   ] Unspecified       [   ] Other Neurological Diagnosis (Specify) _____            [   ]  Clinically Undetermined         Please document in your progress notes daily for the duration of treatment, until resolved, and include in your discharge summary.      "

## 2019-04-04 NOTE — PROGRESS NOTES
WRITTEN HEALTHCARE DISCHARGE INFORMATION     Things that YOU are responsible for to Manage Your Care At Home:  1. Getting your prescriptions filled.  2. Taking you medications as directed. DO NOT MISS ANY DOSES!  3. Going to your follow-up doctor appointments. This is important because it allows the doctor to monitor your progress and to determine if any changes need to be made to your treatment plan.    If you are unable to make your follow up appointments, please call the number listed and reschedule this appointment.     After discharge, if you need assistance, you can call [facility] for 24/7 assistance at [phone]    Thank you for choosing [facility] and allowing us to care for you.   From your care manager:Chantelle POOLE,TN @ [phone]     You should receive a call from [facility] within 48-72 hours to help manage your care after discharge. Please try to make sure that you answer your phone for this important phone call.     Follow-up Information     Latesha Dias MD On 4/11/2019.    Specialty:  Internal Medicine  Why:  On Thursday @ 8:45am, outpatient services  Contact information:  [address] [facility] [address]  [facility] [address]  [phone]             Pietro Steward MD In 1 week.    Specialties:  Vascular Surgery, Surgery  Why:  Nurse will call you to schedule your vascular appt   Contact information:  [address]  [address]  [facility] [address]  [phone]

## 2019-04-04 NOTE — PLAN OF CARE
"TN reviewed follow up appointment information as well as  "TIA discharge instructions" handout with patient using teach back. Patient stated she will notify the doctor if she has trouble speaking, double vision, and problems walking.  Patient is in agreement and verbalized an understanding. Placed discharge information in blue discharge folder.  TN also reviewed patient responsibility checklist with her using teach back. Patient was able to verbalize her responsibilities after discharge to manage her care at home bein. Going to follow up appointments   2. Picking up rx from the pharmacy when discharged  3. Taking her medications as prescribed     PCP appt cancelled. Patient stated she will schedule her appt after surgery.   Patient informed that TN will contact her with the name of her home health company once authorized by [facility].           19 1200   Final Note   Assessment Type Final Discharge Note   Anticipated Discharge Disposition Home-Health   What phone number can be called within the next 1-3 days to see how you are doing after discharge?   ([phone])   Hospital Follow Up  Appt(s) scheduled? Yes   Discharge plans and expectations educations in teach back method with documentation complete? Yes   Right Care Referral Info   Post Acute Recommendation Home-care   Facility Name   (Awaiting [facility] auth for home health)     "

## 2019-04-04 NOTE — DISCHARGE SUMMARY
[facility]  Discharge Summary      Patient Name: Cleopatra Lozada  MRN: [id number]  Admission Date: 4/2/2019  Hospital Length of Stay: 1 days  Discharge Date and Time:  04/04/2019 11:21 AM  Attending Physician: Josi Kelly MD   Discharging Provider: Josi Kelly MD  Primary Care Provider: Latesha Dias MD      HPI:   Mr. Lozada is a 77 yo female with significant history for hypertension, AAA, diastolic dysfunction, CKD stage 3 and hx of CVA in 2014 who comes to the hospital for left sides weakness and numbness since Michael 3/31/19. Patient woke up Sunday night with left sided numbness/weakness. Patient unable to ambulates yesterday but now able to ambulate with unsteady gait today. Patient drove self to hospital. Reports symptoms are improving but not back to baseline. Denies headaches, dizziness, change in vision or change in speech. Continues to take ASA but off plavix a year ago. Does not have home BP.     BP on admit 160/115 improved without intervention 111's/50-60's. EKG NSR with first degree AVB. CXR clear. Neurology consult in ED. CTA head neck pending.     * No surgery found *      Hospital Course:   Admitted for acute stroke. MRI showing R corona radiata with extension into posterior limb of right internal capsule. 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 Still has some L leg weakness. TTE EF 55%, mild AS. On telemetry- NSR. PT, OT recommend home health. Speech recommends regular diet. On asa, plavix, and atorvastatin. Initially held BP meds for permissive HTN. Neurology consulted- started asa and plavix. Started metoprolol and changed home valsartan to losartan. Encouraged smoking cessation. Vascular consulted and recommend cardiac clearance for R CEA. Stress test with no ischemia; cleared at low to intermediate risk from CV standpoint based off of testing. Plan for outpatient R CEA on 4/10. Stable for discharge to home with  home health.          Consults:   Consults (From admission, onward)        Status Ordering Provider     Inpatient consult to Cardiology  Once     Provider:  Taiwo Hoyt MD    Completed YENNY JARVIS     Inpatient consult to Neurology  Once     Provider:  Lopez Bahena MD    Completed TALI KING     Inpatient consult to Vascular Surgery  Once     Provider:  Pietro Steward MD    Completed YENNY JARVIS            Final Active Diagnoses:    Diagnosis Date Noted POA    PRINCIPAL PROBLEM:  Preop cardiovascular exam [Z01.810] 04/03/2019 Not Applicable    Acute CVA (cerebrovascular accident) [I63.9] 04/02/2019 Yes    Essential hypertension [I10] 04/02/2019 Yes     Chronic    CKD (chronic kidney disease) stage 3, GFR 30-59 ml/min [N18.3] 04/02/2019 Yes     Chronic    Abdominal aortic aneurysm [I71.4] 03/12/2014 Yes     Chronic      Problems Resolved During this Admission:    Diagnosis Date Noted Date Resolved POA    Transient ischemic attack [G45.9] 04/02/2019 04/03/2019 Yes       Discharged Condition: good    Disposition: Home-Health Care [facility]    Follow Up:  Follow-up Information     Latesha Dias MD On 4/11/2019.    Specialty:  Internal Medicine  Why:  On Thursday @ 8:45am, outpatient services  Contact information:  [address] [facility] [address]  [facility] [address] [phone]             Pietro Steward MD In 1 week.    Specialties:  Vascular Surgery, Surgery  Why:  Nurse will call you to schedule your vascular appt   Contact information:  [address]  [address]  [facility] [id number] [phone]                 Patient Instructions:      Diet Cardiac     Notify your health care provider if you experience any of the following:  temperature >100.4     Notify your health care provider if you experience any of the following:  persistent nausea and vomiting or diarrhea     Notify your health care provider if you experience any of the following:  severe uncontrolled pain      Notify your health care provider if you experience any of the following:  redness, tenderness, or signs of infection (pain, swelling, redness, odor or green/yellow discharge around incision site)     Notify your health care provider if you experience any of the following:  difficulty breathing or increased cough     Notify your health care provider if you experience any of the following:  severe persistent headache     Notify your health care provider if you experience any of the following:  worsening rash     Notify your health care provider if you experience any of the following:  persistent dizziness, light-headedness, or visual disturbances     Notify your health care provider if you experience any of the following:  increased confusion or weakness     Activity as tolerated       Significant Diagnostic Studies: Labs: All labs within the past 24 hours have been reviewed    Pending Diagnostic Studies:     None         Medications:  Reconciled Home Medications:      Medication List      START taking these medications    aspirin 81 MG EC tablet  Commonly known as:  ECOTRIN  Take 1 tablet (81 mg total) by mouth once daily.  Start taking on:  4/5/2019  Replaces:  aspirin 325 MG tablet     atorvastatin 80 MG tablet  Commonly known as:  LIPITOR  Take 1 tablet (80 mg total) by mouth once daily.     losartan 50 MG tablet  Commonly known as:  COZAAR  Take 1 tablet (50 mg total) by mouth once daily.        CHANGE how you take these medications    metoprolol succinate 25 MG 24 hr tablet  Commonly known as:  TOPROL-XL  Take 1 tablet (25 mg total) by mouth once daily.  What changed:  how much to take        CONTINUE taking these medications    clopidogrel 75 mg tablet  Commonly known as:  PLAVIX  Take 1 tablet (75 mg total) by mouth once daily.        STOP taking these medications    amLODIPine 10 MG tablet  Commonly known as:  NORVASC     aspirin 325 MG tablet  Replaced by:  aspirin 81 MG EC tablet     valsartan 160 MG  tablet  Commonly known as:  DIOVAN            Indwelling Lines/Drains at time of discharge:   Lines/Drains/Airways          None          Time spent on the discharge of patient: 45 minutes  Patient was seen and examined on the date of discharge and determined to be suitable for discharge.         Josi Kelly MD  Department of Hospital Medicine  [facility]

## 2019-04-04 NOTE — PROGRESS NOTES
Patient's cane delivered to patient's room. Approved by Shawanda with [facility].     Nurse Riri informed that patient can discharge from  standpoint.

## 2019-04-05 ENCOUNTER — TELEPHONE (OUTPATIENT)
Dept: PREADMISSION TESTING | Facility: HOSPITAL | Age: 79
End: 2019-04-05

## 2019-04-05 NOTE — TELEPHONE ENCOUNTER
----- Message from Lexus Pendleton RN sent at 4/4/2019 11:55 AM CDT -----  Regarding: preop appt  Dr. Steward would like this patient to have a anesthesia appointment prior to surgery on April 10. If you can please schedule. Thanks.   Lexus

## 2019-04-05 NOTE — PROGRESS NOTES
[facility]  Vascular Surgery  Progress Note    Patient Name: Cleopatra Lozada  MRN: [id number]  Admission Date: 4/2/2019  Primary Care Provider: Latesha Dias MD    Subjective:     Interval History: No new neuro events.    Post-Op Info:  * No surgery found *           Medications:  Continuous Infusions:  Scheduled Meds:  PRN Meds:     Objective:     Vital Signs (Most Recent):  Temp: 97.7 °F (36.5 °C) (04/04/19 1145)  Pulse: 95 (04/04/19 1145)  Resp: 17 (04/04/19 1145)  BP: 130/75 (04/04/19 1145)  SpO2: (!) 93 % (04/04/19 1145) Vital Signs (24h Range):  Temp:  [97.7 °F (36.5 °C)] 97.7 °F (36.5 °C)  Pulse:  [95] 95  Resp:  [17] 17  SpO2:  [93 %] 93 %  BP: (130)/(75) 130/75     Date 04/04/19 0700 - 04/05/19 0659(Discharged)   Shift 0512-9571 3407-5522 9072-6392 24 Hour Total   INTAKE   P.O. 120   120   Shift Total(mL/kg) 120(2.3)   120(2.3)   OUTPUT   Shift Total(mL/kg)       Weight (kg) 53.1 53.1 53.1 53.1       Physical Exam   Constitutional: She appears well-developed and well-nourished. No distress.   HENT:   Head: Normocephalic and atraumatic.   Eyes: Conjunctivae and EOM are normal.   Neck: Neck supple.   Cardiovascular: Normal rate.   Pulses:       Radial pulses are 2+ on the right side, and 2+ on the left side.        Posterior tibial pulses are 1+ on the right side, and 1+ on the left side.   Pulmonary/Chest: Effort normal. No respiratory distress.   Abdominal: Soft. She exhibits no distension and no mass. There is no tenderness. There is no rebound and no guarding. No hernia.   Musculoskeletal: Normal range of motion. She exhibits no edema, tenderness or deformity.   Neurological: She is alert. No cranial nerve deficit or sensory deficit.   Skin: Skin is warm and dry. Capillary refill takes less than 2 seconds. No rash noted. She is not diaphoretic. No pallor.   Psychiatric: She has a normal mood and affect.   Vitals reviewed.      Significant Labs:  All pertinent labs from the last 24  hours have been reviewed.    Significant Diagnostics:  I have reviewed all pertinent imaging results/findings within the past 24 hours.    Assessment/Plan:     Acute CVA (cerebrovascular accident)  -R corona radiata and internal capsule acute infarct with L sided weakness/paresthesias and moderate to severe R ICA stenosis on CTA >50% - rec R CEA; risks benefits d/w pt who states understanding and desires to proceed with surgery  -Scheduled for 4/10/19  -If stenosis >50% - rec R CEA; pt declining surgery; risks of increased stroke rate with medical mgmt d/w pt who states she does not desire surgery  -Cont ASA, Plavix, high intensity Atorvastatin  -Cardiology consult for preoperative risk stratification - stress test performed and pt cleared at low to intermediate risk from CV standpoint based off of testing   -Preop anesthesia appt        Abdominal aortic aneurysm  -s/p EVAR 2011 without appropriate follow up surveillance - rec CTA A/P and outpatient follow up  -No abd or back pain         Pietro Steward MD  Vascular Surgery  [facility]

## 2019-04-05 NOTE — H&P (VIEW-ONLY)
[facility]  Vascular Surgery  Progress Note    Patient Name: Cleopatra Lozada  MRN: [id number]  Admission Date: 4/2/2019  Primary Care Provider: Latesha Dias MD    Subjective:     Interval History: No new neuro events.    Post-Op Info:  * No surgery found *           Medications:  Continuous Infusions:  Scheduled Meds:  PRN Meds:     Objective:     Vital Signs (Most Recent):  Temp: 97.7 °F (36.5 °C) (04/04/19 1145)  Pulse: 95 (04/04/19 1145)  Resp: 17 (04/04/19 1145)  BP: 130/75 (04/04/19 1145)  SpO2: (!) 93 % (04/04/19 1145) Vital Signs (24h Range):  Temp:  [97.7 °F (36.5 °C)] 97.7 °F (36.5 °C)  Pulse:  [95] 95  Resp:  [17] 17  SpO2:  [93 %] 93 %  BP: (130)/(75) 130/75     Date 04/04/19 0700 - 04/05/19 0659(Discharged)   Shift 5260-8648 3436-3743 0424-2615 24 Hour Total   INTAKE   P.O. 120   120   Shift Total(mL/kg) 120(2.3)   120(2.3)   OUTPUT   Shift Total(mL/kg)       Weight (kg) 53.1 53.1 53.1 53.1       Physical Exam   Constitutional: She appears well-developed and well-nourished. No distress.   HENT:   Head: Normocephalic and atraumatic.   Eyes: Conjunctivae and EOM are normal.   Neck: Neck supple.   Cardiovascular: Normal rate.   Pulses:       Radial pulses are 2+ on the right side, and 2+ on the left side.        Posterior tibial pulses are 1+ on the right side, and 1+ on the left side.   Pulmonary/Chest: Effort normal. No respiratory distress.   Abdominal: Soft. She exhibits no distension and no mass. There is no tenderness. There is no rebound and no guarding. No hernia.   Musculoskeletal: Normal range of motion. She exhibits no edema, tenderness or deformity.   Neurological: She is alert. No cranial nerve deficit or sensory deficit.   Skin: Skin is warm and dry. Capillary refill takes less than 2 seconds. No rash noted. She is not diaphoretic. No pallor.   Psychiatric: She has a normal mood and affect.   Vitals reviewed.      Significant Labs:  All pertinent labs from the last 24  hours have been reviewed.    Significant Diagnostics:  I have reviewed all pertinent imaging results/findings within the past 24 hours.    Assessment/Plan:     Acute CVA (cerebrovascular accident)  -R corona radiata and internal capsule acute infarct with L sided weakness/paresthesias and moderate to severe R ICA stenosis on CTA >50% - rec R CEA; risks benefits d/w pt who states understanding and desires to proceed with surgery  -Scheduled for 4/10/19  -If stenosis >50% - rec R CEA; pt declining surgery; risks of increased stroke rate with medical mgmt d/w pt who states she does not desire surgery  -Cont ASA, Plavix, high intensity Atorvastatin  -Cardiology consult for preoperative risk stratification - stress test performed and pt cleared at low to intermediate risk from CV standpoint based off of testing   -Preop anesthesia appt        Abdominal aortic aneurysm  -s/p EVAR 2011 without appropriate follow up surveillance - rec CTA A/P and outpatient follow up  -No abd or back pain         Pietro Steward MD  Vascular Surgery  [facility]

## 2019-04-05 NOTE — SUBJECTIVE & OBJECTIVE
Medications:  Continuous Infusions:  Scheduled Meds:  PRN Meds:     Objective:     Vital Signs (Most Recent):  Temp: 97.7 °F (36.5 °C) (04/04/19 1145)  Pulse: 95 (04/04/19 1145)  Resp: 17 (04/04/19 1145)  BP: 130/75 (04/04/19 1145)  SpO2: (!) 93 % (04/04/19 1145) Vital Signs (24h Range):  Temp:  [97.7 °F (36.5 °C)] 97.7 °F (36.5 °C)  Pulse:  [95] 95  Resp:  [17] 17  SpO2:  [93 %] 93 %  BP: (130)/(75) 130/75     Date 04/04/19 0700 - 04/05/19 0659(Discharged)   Shift 2542-4425 9681-6892 2720-7425 24 Hour Total   INTAKE   P.O. 120   120   Shift Total(mL/kg) 120(2.3)   120(2.3)   OUTPUT   Shift Total(mL/kg)       Weight (kg) 53.1 53.1 53.1 53.1       Physical Exam   Constitutional: She appears well-developed and well-nourished. No distress.   HENT:   Head: Normocephalic and atraumatic.   Eyes: Conjunctivae and EOM are normal.   Neck: Neck supple.   Cardiovascular: Normal rate.   Pulses:       Radial pulses are 2+ on the right side, and 2+ on the left side.        Posterior tibial pulses are 1+ on the right side, and 1+ on the left side.   Pulmonary/Chest: Effort normal. No respiratory distress.   Abdominal: Soft. She exhibits no distension and no mass. There is no tenderness. There is no rebound and no guarding. No hernia.   Musculoskeletal: Normal range of motion. She exhibits no edema, tenderness or deformity.   Neurological: She is alert. No cranial nerve deficit or sensory deficit.   Skin: Skin is warm and dry. Capillary refill takes less than 2 seconds. No rash noted. She is not diaphoretic. No pallor.   Psychiatric: She has a normal mood and affect.   Vitals reviewed.      Significant Labs:  All pertinent labs from the last 24 hours have been reviewed.    Significant Diagnostics:  I have reviewed all pertinent imaging results/findings within the past 24 hours.

## 2019-04-05 NOTE — PT/OT/SLP DISCHARGE
Physical Therapy Discharge Summary    Name: Cleopatra Lozada  MRN: [id number]   Principal Problem: Preop cardiovascular exam     Patient Discharged from acute Physical Therapy on 19.  Please refer to prior PT notes for functional status.     Assessment:     Patient appropriate for care in another setting.    Objective:     GOALS:   Multidisciplinary Problems     Physical Therapy Goals     Not on file          Multidisciplinary Problems (Resolved)        Problem: Physical Therapy Goal    Goal Priority Disciplines Outcome Goal Variances Interventions   Physical Therapy Goal   (Resolved)     PT, PT/OT Outcome(s) achieved     Description:  Goals to be met by: 19     Patient will increase functional independence with mobility by performin. Supine to sit with Modified [place]  2. Rolling to Left and Right with Modified [place]  3. Sit to stand transfer with Modified [place]  4. Bed to chair transfer with Modified [place]  5. Gait >250 feet with Modified [place] using Single-point Cane   6. Lower extremity exercise program 3 sets x10 reps per handout, with independence                      Reasons for Discontinuation of Therapy Services  Transfer to alternate level of care.      Plan:     Patient Discharged to: Home with Home Health Service.    Nickie Bolden, PT  2019

## 2019-04-05 NOTE — ASSESSMENT & PLAN NOTE
-R corona radiata and internal capsule acute infarct with L sided weakness/paresthesias and moderate to severe R ICA stenosis on CTA >50% - rec R CEA; risks benefits d/w pt who states understanding and desires to proceed with surgery  -Scheduled for 4/10/19  -If stenosis >50% - rec R CEA; pt declining surgery; risks of increased stroke rate with medical mgmt d/w pt who states she does not desire surgery  -Cont ASA, Plavix, high intensity Atorvastatin  -Cardiology consult for preoperative risk stratification - stress test performed and pt cleared at low to intermediate risk from CV standpoint based off of testing   -Preop anesthesia appt

## 2019-04-08 ENCOUNTER — HOSPITAL ENCOUNTER (OUTPATIENT)
Dept: PREADMISSION TESTING | Facility: HOSPITAL | Age: 79
Discharge: HOME | End: 2019-04-08
Attending: ANESTHESIOLOGY
Payer: MEDICARE

## 2019-04-08 ENCOUNTER — ANESTHESIA EVENT (OUTPATIENT)
Dept: SURGERY | Facility: HOSPITAL | Age: 79
DRG: 038 | End: 2019-04-08
Payer: MEDICARE

## 2019-04-08 ENCOUNTER — PATIENT OUTREACH (OUTPATIENT)
Dept: ADMINISTRATIVE | Facility: CLINIC | Age: 79
End: 2019-04-08

## 2019-04-08 VITALS
DIASTOLIC BLOOD PRESSURE: 86 MMHG | SYSTOLIC BLOOD PRESSURE: 124 MMHG | BODY MASS INDEX: 23.99 KG/M2 | HEART RATE: 72 BPM | OXYGEN SATURATION: 98 % | HEIGHT: 59 IN | TEMPERATURE: 98 F | RESPIRATION RATE: 18 BRPM | WEIGHT: 119 LBS

## 2019-04-08 NOTE — DISCHARGE INSTRUCTIONS
1. Stop ALL solid food, gum, candy (including vitamins) 8 hours before surgery/procedure time.  2. Stop all CLOUDY liquids: coffee with creamer, formula, tube feeds, cloudy juices, non-human milk and breast milk with additives, 6 hours prior to surgery/procedure time.  3. Stop plain breast milk 4 hours prior to surgery/procedure time.  4. The patient should be ENCOURAGED to drink carbohydrate-rich clear liquids (sports drinks, clear juices) until 2 hours prior to surgery/procedure time.  5. CLEAR liquids include only water, black coffee NO creamer, clear oral rehydration drinks, clear sports drinks or clear fruit juices (no orange juice, no pulpy juices, no apple cider). Advise patients if they can read newsprint through the liquid, it qualifies as clear liquid.   6. IF IN DOUBT, drink water instead.   7. NOTHING TO EAT OR DRINK 2 hours before to surgery/procedure time. If you are told to take medication on the morning of surgery, it may be taken with a sip of water.

## 2019-04-08 NOTE — ANESTHESIA PREPROCEDURE EVALUATION
04/08/2019  Cleopatra Lozada is a 78 y.o., female with significant history for   hypertension, AAA, diastolic dysfunction, CKD stage 3 and CVA.  Pre-operative evaluation for Procedure(s) (LRB):  ENDARTERECTOMY-CAROTID, RIGHT (Right)    Cleopatra Lozada is a 78 y.o. female     LDA:     Prev airway:     Drips:     Patient Active Problem List   Diagnosis    Abdominal aortic aneurysm    Acute CVA (cerebrovascular accident)    Essential hypertension    CKD (chronic kidney disease) stage 3, GFR 30-59 ml/min    Preop cardiovascular exam       Review of patient's allergies indicates:   Allergen Reactions    Tetanus vaccines and toxoid Rash        No current facility-administered medications on file prior to encounter.      Current Outpatient Medications on File Prior to Encounter   Medication Sig Dispense Refill    aspirin (ECOTRIN) 81 MG EC tablet Take 1 tablet (81 mg total) by mouth once daily.  0    atorvastatin (LIPITOR) 80 MG tablet Take 1 tablet (80 mg total) by mouth once daily. 90 tablet 3    clopidogrel (PLAVIX) 75 mg tablet Take 1 tablet (75 mg total) by mouth once daily. 30 tablet 11    losartan (COZAAR) 50 MG tablet Take 1 tablet (50 mg total) by mouth once daily. 90 tablet 3    metoprolol succinate (TOPROL-XL) 25 MG 24 hr tablet Take 1 tablet (25 mg total) by mouth once daily. 30 tablet 2       Past Surgical History:   Procedure Laterality Date    ABDOMINAL AORTIC ANEURYSM REPAIR  11/15/2011    TONSILLECTOMY             Vital Signs Range (Last 24H):  Temp:  [36.7 °C (98.1 °F)]   Pulse:  [67]   Resp:  [18]   BP: (131)/(69)   SpO2:  [96 %]         CMP:       Diagnostic Studies:          Anesthesia Evaluation    I have reviewed the Patient Summary Reports.    I have reviewed the Nursing Notes.   I have reviewed the Medications.     Review of Systems  Anesthesia Hx:  Denies Family Hx of  Anesthesia complications.   Denies Personal Hx of Anesthesia complications.   Social:  Former Smoker, No Alcohol Use Quit smoking last week -smoked about 1/2 pack for the past 3 years.  Prior to that, smoked a pack a day for 61 years   Hematology/Oncology:  Hematology Normal   Oncology Normal     Cardiovascular:   Exercise tolerance: good Denies Pacemaker. Hypertension, well controlled  Denies MI.  Denies CAD.    Denies CABG/stent.   Denies Angina. ECG has been reviewed.  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   Musculoskeletal General/Symptoms: Functional capacity is ambulatory with cane.    Neurological:   TIA, Denies Seizures.  CVA - Cerebrovasular Accident (L sided weakness/paresthesias ) , Most recent CVA was on 3/31/19 , has had >1 stroke    Endocrine:  Endocrine Normal        Physical Exam  General:  Well nourished    Airway/Jaw/Neck:  Airway Findings: Mouth Opening: Small, but > 3cm Tongue: Normal  General Airway Assessment: Adult  Mallampati: III  TM Distance: Normal, at least 6 cm  Jaw/Neck Findings:  Neck ROM: Extension Decreased, Mild      Dental:  Dental Findings: Upper Dentures, Lower Dentures   Chest/Lungs:  Chest/Lungs Findings: Clear to auscultation, Normal Respiratory Rate     Heart/Vascular:  Heart Findings: Rate: Normal  Rhythm: Regular Rhythm  Heart Murmur  Systolic  Systolic Heart Murmur Description: R Upper Sternal Border, Radiates to Carotids  Systolic Heart Murmur Grade: Grade II        Mental Status:  Mental Status Findings:  Cooperative, Alert and Oriented         Anesthesia Plan  Type of Anesthesia, risks & benefits discussed:  Anesthesia Type:  general  Patient's Preference:   Intra-op Monitoring Plan: standard ASA  monitors and arterial line  Intra-op Monitoring Plan Comments:   Post Op Pain Control Plan:   Post Op Pain Control Plan Comments:   Induction:   IV  Beta Blocker:  Patient is on a Beta-Blocker and has received one dose within the past 24 hours (No further documentation required).       Informed Consent: Patient understands risks and agrees with Anesthesia plan.  Questions answered. Anesthesia consent signed with patient.  ASA Score: 3     Day of Surgery Review of History & Physical: I have interviewed and examined the patient. I have reviewed the patient's H&P dated:            Ready For Surgery From Anesthesia Perspective.     Cardiac Clearance:  Stress test with no ischemia; cleared at low to intermediate risk from CV standpoint based off of testing.    TTE 4/3/19:  Conclusion     · Normal left ventricular systolic function. The estimated ejection fraction is 55%  · Concentric left ventricular remodeling.  · Mild aortic valve stenosis.  · Aortic valve area is 1.71 cm2; peak velocity is 2.33 m/s; mean gradient is 11.75 mmHg.

## 2019-04-08 NOTE — PATIENT INSTRUCTIONS
Stroke (Completed)  You have had a mild stroke, also called CVA, or cerebrovascular accident. This is caused by a loss of blood flow to part of your brain. This can occur when a blood clot forms inside the carotid artery (main artery from the heart to the brain) or inside the heart (as with atrial fibrillation, an irregular heart rhythm). When the clot travels to the brain, it can lodge in a blood vessel and block blood flow. The other common cause of stroke is a gradual narrowing of the arteries in the brain due to atherosclerosis (hardening of the arteries).  Once you have had a stroke, you are at risk of having another. Therefore, be sure to follow up with your doctor for further evaluation and treatment. This may include an ultrasound of the arteries in your neck and an evaluation of your heart. If problems are found, your doctor will recommend treatment with medications and/or procedures.  Medications to reduce your chance of having another stroke include those that prevent blood clots, such as antiplatelet medicines (aspirin and Plavix) and anticoagulant medicines (warfarin).  Home Care:  Rest at home and avoid exertion for the next few days.  If your doctor has prescribed antiplatelet medication, take it as directed.  Follow Up:  Call your doctor for an appointment in the next few days for a repeat exam. Additional tests may be needed. If you had an x-ray, CT scan, MRI scan, or ECG (electrocardiogram), it will be reviewed by a specialist. You will be notified of any new findings that will affect your care.  Call 911 Or Emergency Services  if any of the following occur:  Any of your stroke symptoms worsen  New problems with speech, vision, walking, or weakness or numbness on one side of your body  Severe headache, fainting spell, dizziness or seizure  Chest pain or shortness of breath  © 6287-3872 [facility], [address] [place] [address], [place] [address]. All rights reserved. This information is not intended  as a substitute for professional medical care. Always follow your healthcare professional's instructions.

## 2019-04-08 NOTE — PROGRESS NOTES
FC3 nurse attempted to contact patient. No answer. The following message was left for the patient to return the call:  Good morning  I am a nurse calling on behalf of [facility] from the [facility].  This is a Transitional Care Call for Cleopatra Lozada . When you have a moment please contact us at [phone] or [phone] Monday through Friday, between the hours of 8 am to 4 pm. We look forward to speaking with you. On behalf of [facility] have a nice day.    The patient has a scheduled HOSFU appointment with Latesha Dias MD  on 4/11  @ 0778. Message sent to Physician staff.  [facility]

## 2019-04-09 ENCOUNTER — TELEPHONE (OUTPATIENT)
Dept: VASCULAR SURGERY | Facility: CLINIC | Age: 79
End: 2019-04-09

## 2019-04-09 NOTE — TELEPHONE ENCOUNTER
Call to Ms. Lozada and explained to be at the [facility] for 9:30am. Explained for her to check in on 2nd floor for same day surgery. She stated understanding.

## 2019-04-10 ENCOUNTER — HOSPITAL ENCOUNTER (INPATIENT)
Facility: HOSPITAL | Age: 79
LOS: 6 days | Discharge: HOME HEALTH | DRG: 038 | End: 2019-04-16
Attending: SURGERY | Admitting: SURGERY
Payer: MEDICARE

## 2019-04-10 ENCOUNTER — ANESTHESIA (OUTPATIENT)
Dept: SURGERY | Facility: HOSPITAL | Age: 79
DRG: 038 | End: 2019-04-10
Payer: MEDICARE

## 2019-04-10 DIAGNOSIS — I65.21 CAROTID STENOSIS, SYMPTOMATIC W/O INFARCT, RIGHT: ICD-10-CM

## 2019-04-10 DIAGNOSIS — I49.9 ARRHYTHMIA: ICD-10-CM

## 2019-04-10 DIAGNOSIS — I63.9 ACUTE CVA (CEREBROVASCULAR ACCIDENT): Primary | ICD-10-CM

## 2019-04-10 LAB
ABO + RH BLD: NORMAL
BLD GP AB SCN CELLS X3 SERPL QL: NORMAL
BLOOD GROUP ANTIBODIES SERPL: NORMAL
POC ACTIVATED CLOTTING TIME K: 131 SEC (ref 74–137)
POC ACTIVATED CLOTTING TIME K: 208 SEC (ref 74–137)
POC ACTIVATED CLOTTING TIME K: 224 SEC (ref 74–137)
POC ACTIVATED CLOTTING TIME K: 235 SEC (ref 74–137)
POC ACTIVATED CLOTTING TIME K: 296 SEC (ref 74–137)
SAMPLE: ABNORMAL
SAMPLE: NORMAL

## 2019-04-10 PROCEDURE — 37000008 HC ANESTHESIA 1ST 15 MINUTES: Performed by: SURGERY

## 2019-04-10 PROCEDURE — 63600175 PHARM REV CODE 636 W HCPCS: Performed by: STUDENT IN AN ORGANIZED HEALTH CARE EDUCATION/TRAINING PROGRAM

## 2019-04-10 PROCEDURE — 25000003 PHARM REV CODE 250: Performed by: NURSE ANESTHETIST, CERTIFIED REGISTERED

## 2019-04-10 PROCEDURE — 63600175 PHARM REV CODE 636 W HCPCS: Performed by: SURGERY

## 2019-04-10 PROCEDURE — 63600175 PHARM REV CODE 636 W HCPCS: Performed by: NURSE ANESTHETIST, CERTIFIED REGISTERED

## 2019-04-10 PROCEDURE — D9220A PRA ANESTHESIA: ICD-10-PCS | Mod: CRNA,,, | Performed by: NURSE ANESTHETIST, CERTIFIED REGISTERED

## 2019-04-10 PROCEDURE — 37000009 HC ANESTHESIA EA ADD 15 MINS: Performed by: SURGERY

## 2019-04-10 PROCEDURE — 76998 US GUIDE INTRAOP: CPT | Mod: 26,,, | Performed by: SURGERY

## 2019-04-10 PROCEDURE — 36000706: Performed by: SURGERY

## 2019-04-10 PROCEDURE — 35301 RECHANNELING OF ARTERY: CPT | Mod: RT,,, | Performed by: SURGERY

## 2019-04-10 PROCEDURE — 71000039 HC RECOVERY, EACH ADD'L HOUR: Performed by: SURGERY

## 2019-04-10 PROCEDURE — 27201423 OPTIME MED/SURG SUP & DEVICES STERILE SUPPLY: Performed by: SURGERY

## 2019-04-10 PROCEDURE — 35301 PR THROMBOENDARTECTMY NECK,NECK INCIS: ICD-10-PCS | Mod: RT,,, | Performed by: SURGERY

## 2019-04-10 PROCEDURE — 63600175 PHARM REV CODE 636 W HCPCS

## 2019-04-10 PROCEDURE — 76998 PR  ULTRASONIC GUIDANCE, INTRAOPERATIVE: ICD-10-PCS | Mod: 26,,, | Performed by: SURGERY

## 2019-04-10 PROCEDURE — 25000003 PHARM REV CODE 250: Performed by: STUDENT IN AN ORGANIZED HEALTH CARE EDUCATION/TRAINING PROGRAM

## 2019-04-10 PROCEDURE — 86905 BLOOD TYPING RBC ANTIGENS: CPT

## 2019-04-10 PROCEDURE — C1729 CATH, DRAINAGE: HCPCS | Performed by: SURGERY

## 2019-04-10 PROCEDURE — D9220A PRA ANESTHESIA: Mod: ANES,,, | Performed by: ANESTHESIOLOGY

## 2019-04-10 PROCEDURE — D9220A PRA ANESTHESIA: ICD-10-PCS | Mod: ANES,,, | Performed by: ANESTHESIOLOGY

## 2019-04-10 PROCEDURE — 88304 TISSUE SPECIMEN TO PATHOLOGY - SURGERY: ICD-10-PCS | Mod: 26,,, | Performed by: PATHOLOGY

## 2019-04-10 PROCEDURE — D9220A PRA ANESTHESIA: Mod: CRNA,,, | Performed by: NURSE ANESTHETIST, CERTIFIED REGISTERED

## 2019-04-10 PROCEDURE — 27800903 OPTIME MED/SURG SUP & DEVICES OTHER IMPLANTS: Performed by: SURGERY

## 2019-04-10 PROCEDURE — 25000003 PHARM REV CODE 250: Performed by: SURGERY

## 2019-04-10 PROCEDURE — C1768 GRAFT, VASCULAR: HCPCS | Performed by: SURGERY

## 2019-04-10 PROCEDURE — 63600175 PHARM REV CODE 636 W HCPCS: Performed by: ANESTHESIOLOGY

## 2019-04-10 PROCEDURE — 86870 RBC ANTIBODY IDENTIFICATION: CPT

## 2019-04-10 PROCEDURE — 88304 TISSUE EXAM BY PATHOLOGIST: CPT | Performed by: PATHOLOGY

## 2019-04-10 PROCEDURE — 71000033 HC RECOVERY, INTIAL HOUR: Performed by: SURGERY

## 2019-04-10 PROCEDURE — 20600001 HC STEP DOWN PRIVATE ROOM

## 2019-04-10 PROCEDURE — 86850 RBC ANTIBODY SCREEN: CPT

## 2019-04-10 PROCEDURE — 36000707: Performed by: SURGERY

## 2019-04-10 DEVICE — GRAFT VAS GUARD 0.8X8CM BOVINE: Type: IMPLANTABLE DEVICE | Site: CAROTID | Status: FUNCTIONAL

## 2019-04-10 RX ORDER — PROPOFOL 10 MG/ML
VIAL (ML) INTRAVENOUS
Status: DISCONTINUED | OUTPATIENT
Start: 2019-04-10 | End: 2019-04-10

## 2019-04-10 RX ORDER — FENTANYL CITRATE 50 UG/ML
INJECTION, SOLUTION INTRAMUSCULAR; INTRAVENOUS
Status: COMPLETED
Start: 2019-04-10 | End: 2019-04-10

## 2019-04-10 RX ORDER — FENTANYL CITRATE 50 UG/ML
25 INJECTION, SOLUTION INTRAMUSCULAR; INTRAVENOUS EVERY 5 MIN PRN
Status: COMPLETED | OUTPATIENT
Start: 2019-04-10 | End: 2019-04-10

## 2019-04-10 RX ORDER — SODIUM CHLORIDE 9 MG/ML
INJECTION, SOLUTION INTRAVENOUS CONTINUOUS
Status: DISCONTINUED | OUTPATIENT
Start: 2019-04-10 | End: 2019-04-10

## 2019-04-10 RX ORDER — LABETALOL HYDROCHLORIDE 5 MG/ML
INJECTION, SOLUTION INTRAVENOUS
Status: DISCONTINUED | OUTPATIENT
Start: 2019-04-10 | End: 2019-04-10

## 2019-04-10 RX ORDER — MUPIROCIN 20 MG/G
1 OINTMENT TOPICAL 2 TIMES DAILY
Status: DISPENSED | OUTPATIENT
Start: 2019-04-10 | End: 2019-04-15

## 2019-04-10 RX ORDER — PHENYLEPHRINE HYDROCHLORIDE 10 MG/ML
INJECTION INTRAVENOUS
Status: DISCONTINUED | OUTPATIENT
Start: 2019-04-10 | End: 2019-04-10

## 2019-04-10 RX ORDER — FENTANYL CITRATE 50 UG/ML
INJECTION, SOLUTION INTRAMUSCULAR; INTRAVENOUS
Status: DISCONTINUED | OUTPATIENT
Start: 2019-04-10 | End: 2019-04-10

## 2019-04-10 RX ORDER — NEOSTIGMINE METHYLSULFATE 1 MG/ML
INJECTION, SOLUTION INTRAVENOUS
Status: DISCONTINUED | OUTPATIENT
Start: 2019-04-10 | End: 2019-04-10

## 2019-04-10 RX ORDER — GLYCOPYRROLATE 0.2 MG/ML
INJECTION INTRAMUSCULAR; INTRAVENOUS
Status: DISCONTINUED | OUTPATIENT
Start: 2019-04-10 | End: 2019-04-10

## 2019-04-10 RX ORDER — ROCURONIUM BROMIDE 10 MG/ML
INJECTION, SOLUTION INTRAVENOUS
Status: DISCONTINUED | OUTPATIENT
Start: 2019-04-10 | End: 2019-04-10

## 2019-04-10 RX ORDER — METOPROLOL SUCCINATE 25 MG/1
25 TABLET, EXTENDED RELEASE ORAL DAILY
Status: DISCONTINUED | OUTPATIENT
Start: 2019-04-11 | End: 2019-04-11

## 2019-04-10 RX ORDER — PROTAMINE SULFATE 10 MG/ML
INJECTION, SOLUTION INTRAVENOUS
Status: DISCONTINUED | OUTPATIENT
Start: 2019-04-10 | End: 2019-04-10

## 2019-04-10 RX ORDER — HYDROCODONE BITARTRATE AND ACETAMINOPHEN 5; 325 MG/1; MG/1
TABLET ORAL
Status: DISPENSED
Start: 2019-04-10 | End: 2019-04-11

## 2019-04-10 RX ORDER — SODIUM CHLORIDE 0.9 % (FLUSH) 0.9 %
10 SYRINGE (ML) INJECTION
Status: DISCONTINUED | OUTPATIENT
Start: 2019-04-10 | End: 2019-04-10 | Stop reason: HOSPADM

## 2019-04-10 RX ORDER — ONDANSETRON 2 MG/ML
4 INJECTION INTRAMUSCULAR; INTRAVENOUS ONCE AS NEEDED
Status: DISCONTINUED | OUTPATIENT
Start: 2019-04-10 | End: 2019-04-10 | Stop reason: HOSPADM

## 2019-04-10 RX ORDER — ASPIRIN 81 MG/1
81 TABLET ORAL DAILY
Status: DISCONTINUED | OUTPATIENT
Start: 2019-04-11 | End: 2019-04-16 | Stop reason: HOSPADM

## 2019-04-10 RX ORDER — HEPARIN SODIUM 1000 [USP'U]/ML
INJECTION, SOLUTION INTRAVENOUS; SUBCUTANEOUS
Status: DISCONTINUED | OUTPATIENT
Start: 2019-04-10 | End: 2019-04-10 | Stop reason: HOSPADM

## 2019-04-10 RX ORDER — HYDROCODONE BITARTRATE AND ACETAMINOPHEN 5; 325 MG/1; MG/1
1 TABLET ORAL EVERY 4 HOURS PRN
Status: DISCONTINUED | OUTPATIENT
Start: 2019-04-10 | End: 2019-04-11

## 2019-04-10 RX ORDER — SODIUM CHLORIDE, SODIUM LACTATE, POTASSIUM CHLORIDE, CALCIUM CHLORIDE 600; 310; 30; 20 MG/100ML; MG/100ML; MG/100ML; MG/100ML
INJECTION, SOLUTION INTRAVENOUS CONTINUOUS
Status: DISCONTINUED | OUTPATIENT
Start: 2019-04-10 | End: 2019-04-11

## 2019-04-10 RX ORDER — LABETALOL HCL 20 MG/4 ML
10 SYRINGE (ML) INTRAVENOUS EVERY 4 HOURS PRN
Status: DISCONTINUED | OUTPATIENT
Start: 2019-04-10 | End: 2019-04-16 | Stop reason: HOSPADM

## 2019-04-10 RX ORDER — HEPARIN SODIUM 1000 [USP'U]/ML
INJECTION, SOLUTION INTRAVENOUS; SUBCUTANEOUS
Status: DISCONTINUED | OUTPATIENT
Start: 2019-04-10 | End: 2019-04-10

## 2019-04-10 RX ORDER — LIDOCAINE HYDROCHLORIDE 10 MG/ML
1 INJECTION, SOLUTION EPIDURAL; INFILTRATION; INTRACAUDAL; PERINEURAL ONCE
Status: DISCONTINUED | OUTPATIENT
Start: 2019-04-10 | End: 2019-04-10

## 2019-04-10 RX ORDER — LOSARTAN POTASSIUM 50 MG/1
50 TABLET ORAL DAILY
Status: DISCONTINUED | OUTPATIENT
Start: 2019-04-11 | End: 2019-04-11

## 2019-04-10 RX ORDER — CEFAZOLIN SODIUM 1 G/3ML
2 INJECTION, POWDER, FOR SOLUTION INTRAMUSCULAR; INTRAVENOUS
Status: COMPLETED | OUTPATIENT
Start: 2019-04-10 | End: 2019-04-11

## 2019-04-10 RX ORDER — REMIFENTANIL HYDROCHLORIDE 1 MG/ML
INJECTION, POWDER, LYOPHILIZED, FOR SOLUTION INTRAVENOUS CONTINUOUS PRN
Status: DISCONTINUED | OUTPATIENT
Start: 2019-04-10 | End: 2019-04-10

## 2019-04-10 RX ORDER — HYDRALAZINE HYDROCHLORIDE 20 MG/ML
10 INJECTION INTRAMUSCULAR; INTRAVENOUS EVERY 4 HOURS PRN
Status: DISCONTINUED | OUTPATIENT
Start: 2019-04-10 | End: 2019-04-16 | Stop reason: HOSPADM

## 2019-04-10 RX ORDER — CLOPIDOGREL BISULFATE 75 MG/1
75 TABLET ORAL DAILY
Status: DISCONTINUED | OUTPATIENT
Start: 2019-04-11 | End: 2019-04-16 | Stop reason: HOSPADM

## 2019-04-10 RX ORDER — LIDOCAINE HCL/PF 100 MG/5ML
SYRINGE (ML) INTRAVENOUS
Status: DISCONTINUED | OUTPATIENT
Start: 2019-04-10 | End: 2019-04-10

## 2019-04-10 RX ORDER — ATORVASTATIN CALCIUM 20 MG/1
80 TABLET, FILM COATED ORAL DAILY
Status: DISCONTINUED | OUTPATIENT
Start: 2019-04-11 | End: 2019-04-16 | Stop reason: HOSPADM

## 2019-04-10 RX ADMIN — Medication 0.1 MCG/KG/MIN: at 03:04

## 2019-04-10 RX ADMIN — NEOSTIGMINE METHYLSULFATE 3 MG: 1 INJECTION INTRAVENOUS at 07:04

## 2019-04-10 RX ADMIN — SODIUM CHLORIDE, SODIUM LACTATE, POTASSIUM CHLORIDE, AND CALCIUM CHLORIDE: .6; .31; .03; .02 INJECTION, SOLUTION INTRAVENOUS at 09:04

## 2019-04-10 RX ADMIN — FENTANYL CITRATE 25 MCG: 50 INJECTION INTRAMUSCULAR; INTRAVENOUS at 09:04

## 2019-04-10 RX ADMIN — PROPOFOL 80 MG: 10 INJECTION, EMULSION INTRAVENOUS at 02:04

## 2019-04-10 RX ADMIN — PROTAMINE SULFATE 15 MG: 10 INJECTION, SOLUTION INTRAVENOUS at 07:04

## 2019-04-10 RX ADMIN — HEPARIN SODIUM 2000 UNITS: 1000 INJECTION, SOLUTION INTRAVENOUS; SUBCUTANEOUS at 05:04

## 2019-04-10 RX ADMIN — SODIUM CHLORIDE, SODIUM GLUCONATE, SODIUM ACETATE, POTASSIUM CHLORIDE, MAGNESIUM CHLORIDE, SODIUM PHOSPHATE, DIBASIC, AND POTASSIUM PHOSPHATE: .53; .5; .37; .037; .03; .012; .00082 INJECTION, SOLUTION INTRAVENOUS at 04:04

## 2019-04-10 RX ADMIN — FENTANYL CITRATE 50 MCG: 50 INJECTION, SOLUTION INTRAMUSCULAR; INTRAVENOUS at 02:04

## 2019-04-10 RX ADMIN — LABETALOL HYDROCHLORIDE 5 MG: 5 INJECTION, SOLUTION INTRAVENOUS at 08:04

## 2019-04-10 RX ADMIN — HEPARIN SODIUM 5500 UNITS: 1000 INJECTION, SOLUTION INTRAVENOUS; SUBCUTANEOUS at 04:04

## 2019-04-10 RX ADMIN — PHENYLEPHRINE HYDROCHLORIDE 50 MCG: 10 INJECTION INTRAVENOUS at 05:04

## 2019-04-10 RX ADMIN — ROCURONIUM BROMIDE 50 MG: 10 INJECTION, SOLUTION INTRAVENOUS at 02:04

## 2019-04-10 RX ADMIN — HEPARIN SODIUM 1000 UNITS: 1000 INJECTION, SOLUTION INTRAVENOUS; SUBCUTANEOUS at 06:04

## 2019-04-10 RX ADMIN — GLYCOPYRROLATE 0.1 MG: 0.2 INJECTION, SOLUTION INTRAMUSCULAR; INTRAVENOUS at 05:04

## 2019-04-10 RX ADMIN — HYDROCODONE BITARTRATE AND ACETAMINOPHEN 1 TABLET: 5; 325 TABLET ORAL at 09:04

## 2019-04-10 RX ADMIN — GLYCOPYRROLATE 0.1 MG: 0.2 INJECTION, SOLUTION INTRAMUSCULAR; INTRAVENOUS at 03:04

## 2019-04-10 RX ADMIN — CEFAZOLIN 2 G: 1 INJECTION, POWDER, FOR SOLUTION INTRAMUSCULAR; INTRAVENOUS at 10:04

## 2019-04-10 RX ADMIN — FENTANYL CITRATE 25 MCG: 50 INJECTION, SOLUTION INTRAMUSCULAR; INTRAVENOUS at 08:04

## 2019-04-10 RX ADMIN — SODIUM CHLORIDE: 0.9 INJECTION, SOLUTION INTRAVENOUS at 02:04

## 2019-04-10 RX ADMIN — LIDOCAINE HYDROCHLORIDE 80 MG: 20 INJECTION, SOLUTION INTRAVENOUS at 02:04

## 2019-04-10 RX ADMIN — PHENYLEPHRINE HYDROCHLORIDE 100 MCG: 10 INJECTION INTRAVENOUS at 05:04

## 2019-04-10 RX ADMIN — SODIUM CHLORIDE, SODIUM GLUCONATE, SODIUM ACETATE, POTASSIUM CHLORIDE, MAGNESIUM CHLORIDE, SODIUM PHOSPHATE, DIBASIC, AND POTASSIUM PHOSPHATE: .53; .5; .37; .037; .03; .012; .00082 INJECTION, SOLUTION INTRAVENOUS at 03:04

## 2019-04-10 RX ADMIN — SODIUM CHLORIDE 0.3 MCG/KG/MIN: 9 INJECTION, SOLUTION INTRAVENOUS at 03:04

## 2019-04-10 RX ADMIN — PROTAMINE SULFATE 5 MG: 10 INJECTION, SOLUTION INTRAVENOUS at 07:04

## 2019-04-10 RX ADMIN — MUPIROCIN 1 G: 20 OINTMENT TOPICAL at 10:04

## 2019-04-10 RX ADMIN — GLYCOPYRROLATE 0.3 MG: 0.2 INJECTION, SOLUTION INTRAMUSCULAR; INTRAVENOUS at 07:04

## 2019-04-10 NOTE — PLAN OF CARE
This writer paged the resident for Bin. This writer got a call from the procedure room. It was reported that Bridger Mueller is currently scrubbed into a procedure. A message was left in regards to missing admission order.

## 2019-04-10 NOTE — PLAN OF CARE
Pt prepared for procedure.    Pt resting comfortably in room, call light within reach.    Needs: Anesthesia consent

## 2019-04-11 PROBLEM — Z98.890 H/O CAROTID ENDARTERECTOMY: Status: ACTIVE | Noted: 2019-04-11

## 2019-04-11 PROBLEM — I65.21 CAROTID STENOSIS, SYMPTOMATIC W/O INFARCT, RIGHT: Status: RESOLVED | Noted: 2019-04-10 | Resolved: 2019-04-11

## 2019-04-11 PROBLEM — I65.21 CAROTID STENOSIS, SYMPTOMATIC W/O INFARCT, RIGHT: Status: ACTIVE | Noted: 2019-04-11

## 2019-04-11 PROBLEM — I65.21 CAROTID STENOSIS, SYMPTOMATIC W/O INFARCT, RIGHT: Status: RESOLVED | Noted: 2019-04-11 | Resolved: 2019-04-11

## 2019-04-11 LAB
ANION GAP SERPL CALC-SCNC: 10 MMOL/L (ref 8–16)
BASOPHILS # BLD AUTO: 0.02 K/UL (ref 0–0.2)
BASOPHILS NFR BLD: 0.3 % (ref 0–1.9)
BUN SERPL-MCNC: 14 MG/DL (ref 8–23)
CALCIUM SERPL-MCNC: 8.1 MG/DL (ref 8.7–10.5)
CHLORIDE SERPL-SCNC: 111 MMOL/L (ref 95–110)
CO2 SERPL-SCNC: 21 MMOL/L (ref 23–29)
CREAT SERPL-MCNC: 1.3 MG/DL (ref 0.5–1.4)
DIFFERENTIAL METHOD: ABNORMAL
EOSINOPHIL # BLD AUTO: 0 K/UL (ref 0–0.5)
EOSINOPHIL NFR BLD: 0 % (ref 0–8)
ERYTHROCYTE [DISTWIDTH] IN BLOOD BY AUTOMATED COUNT: 13.9 % (ref 11.5–14.5)
EST. GFR  (AFRICAN AMERICAN): 45.1 ML/MIN/1.73 M^2
EST. GFR  (NON AFRICAN AMERICAN): 39.1 ML/MIN/1.73 M^2
GLUCOSE SERPL-MCNC: 112 MG/DL (ref 70–110)
HCT VFR BLD AUTO: 32.3 % (ref 37–48.5)
HGB BLD-MCNC: 10 G/DL (ref 12–16)
IMM GRANULOCYTES # BLD AUTO: 0.03 K/UL (ref 0–0.04)
IMM GRANULOCYTES NFR BLD AUTO: 0.4 % (ref 0–0.5)
LYMPHOCYTES # BLD AUTO: 0.7 K/UL (ref 1–4.8)
LYMPHOCYTES NFR BLD: 8.2 % (ref 18–48)
MCH RBC QN AUTO: 28.2 PG (ref 27–31)
MCHC RBC AUTO-ENTMCNC: 31 G/DL (ref 32–36)
MCV RBC AUTO: 91 FL (ref 82–98)
MONOCYTES # BLD AUTO: 0.4 K/UL (ref 0.3–1)
MONOCYTES NFR BLD: 4.6 % (ref 4–15)
NEUTROPHILS # BLD AUTO: 6.8 K/UL (ref 1.8–7.7)
NEUTROPHILS NFR BLD: 86.5 % (ref 38–73)
NRBC BLD-RTO: 0 /100 WBC
PLATELET # BLD AUTO: 216 K/UL (ref 150–350)
PMV BLD AUTO: 11 FL (ref 9.2–12.9)
POC ACTIVATED CLOTTING TIME K: 142 SEC (ref 74–137)
POC ACTIVATED CLOTTING TIME K: >1000 SEC (ref 74–137)
POTASSIUM SERPL-SCNC: 3.8 MMOL/L (ref 3.5–5.1)
RBC # BLD AUTO: 3.54 M/UL (ref 4–5.4)
SAMPLE: ABNORMAL
SAMPLE: ABNORMAL
SODIUM SERPL-SCNC: 142 MMOL/L (ref 136–145)
WBC # BLD AUTO: 7.88 K/UL (ref 3.9–12.7)

## 2019-04-11 PROCEDURE — 85025 COMPLETE CBC W/AUTO DIFF WBC: CPT

## 2019-04-11 PROCEDURE — 99024 POSTOP FOLLOW-UP VISIT: CPT | Mod: ,,, | Performed by: SURGERY

## 2019-04-11 PROCEDURE — 36415 COLL VENOUS BLD VENIPUNCTURE: CPT

## 2019-04-11 PROCEDURE — 25000003 PHARM REV CODE 250: Performed by: STUDENT IN AN ORGANIZED HEALTH CARE EDUCATION/TRAINING PROGRAM

## 2019-04-11 PROCEDURE — 25000242 PHARM REV CODE 250 ALT 637 W/ HCPCS: Performed by: STUDENT IN AN ORGANIZED HEALTH CARE EDUCATION/TRAINING PROGRAM

## 2019-04-11 PROCEDURE — 80048 BASIC METABOLIC PNL TOTAL CA: CPT

## 2019-04-11 PROCEDURE — 20600001 HC STEP DOWN PRIVATE ROOM

## 2019-04-11 PROCEDURE — 63600175 PHARM REV CODE 636 W HCPCS: Performed by: STUDENT IN AN ORGANIZED HEALTH CARE EDUCATION/TRAINING PROGRAM

## 2019-04-11 PROCEDURE — 99024 PR POST-OP FOLLOW-UP VISIT: ICD-10-PCS | Mod: ,,, | Performed by: SURGERY

## 2019-04-11 RX ORDER — IPRATROPIUM BROMIDE AND ALBUTEROL SULFATE 2.5; .5 MG/3ML; MG/3ML
3 SOLUTION RESPIRATORY (INHALATION) EVERY 4 HOURS PRN
Status: DISCONTINUED | OUTPATIENT
Start: 2019-04-11 | End: 2019-04-16 | Stop reason: HOSPADM

## 2019-04-11 RX ORDER — METOPROLOL SUCCINATE 25 MG/1
25 TABLET, EXTENDED RELEASE ORAL DAILY
Status: DISCONTINUED | OUTPATIENT
Start: 2019-04-12 | End: 2019-04-16 | Stop reason: HOSPADM

## 2019-04-11 RX ORDER — OXYCODONE HYDROCHLORIDE 5 MG/1
5 TABLET ORAL EVERY 6 HOURS PRN
Status: DISCONTINUED | OUTPATIENT
Start: 2019-04-11 | End: 2019-04-11

## 2019-04-11 RX ORDER — OXYCODONE HYDROCHLORIDE 5 MG/1
5 TABLET ORAL EVERY 6 HOURS PRN
Status: DISCONTINUED | OUTPATIENT
Start: 2019-04-11 | End: 2019-04-16 | Stop reason: HOSPADM

## 2019-04-11 RX ORDER — LOSARTAN POTASSIUM 50 MG/1
50 TABLET ORAL DAILY
Status: DISCONTINUED | OUTPATIENT
Start: 2019-04-12 | End: 2019-04-16 | Stop reason: HOSPADM

## 2019-04-11 RX ORDER — ACETAMINOPHEN 325 MG/1
650 TABLET ORAL EVERY 6 HOURS PRN
Status: DISCONTINUED | OUTPATIENT
Start: 2019-04-11 | End: 2019-04-16 | Stop reason: HOSPADM

## 2019-04-11 RX ORDER — OXYCODONE HYDROCHLORIDE 10 MG/1
10 TABLET ORAL EVERY 6 HOURS PRN
Status: DISCONTINUED | OUTPATIENT
Start: 2019-04-11 | End: 2019-04-16 | Stop reason: HOSPADM

## 2019-04-11 RX ADMIN — CEFAZOLIN 2 G: 1 INJECTION, POWDER, FOR SOLUTION INTRAMUSCULAR; INTRAVENOUS at 02:04

## 2019-04-11 RX ADMIN — ATORVASTATIN CALCIUM 80 MG: 20 TABLET, FILM COATED ORAL at 09:04

## 2019-04-11 RX ADMIN — OXYCODONE HYDROCHLORIDE 5 MG: 5 TABLET ORAL at 05:04

## 2019-04-11 RX ADMIN — CEFAZOLIN 2 G: 1 INJECTION, POWDER, FOR SOLUTION INTRAMUSCULAR; INTRAVENOUS at 04:04

## 2019-04-11 RX ADMIN — CLOPIDOGREL BISULFATE 75 MG: 75 TABLET, FILM COATED ORAL at 09:04

## 2019-04-11 RX ADMIN — ASPIRIN 81 MG: 81 TABLET, COATED ORAL at 09:04

## 2019-04-11 RX ADMIN — OXYCODONE HYDROCHLORIDE 5 MG: 5 TABLET ORAL at 04:04

## 2019-04-11 RX ADMIN — ACETAMINOPHEN 650 MG: 325 TABLET ORAL at 01:04

## 2019-04-11 RX ADMIN — OXYCODONE HYDROCHLORIDE 5 MG: 5 TABLET ORAL at 11:04

## 2019-04-11 RX ADMIN — MUPIROCIN 1 G: 20 OINTMENT TOPICAL at 09:04

## 2019-04-11 RX ADMIN — IPRATROPIUM BROMIDE AND ALBUTEROL SULFATE 3 ML: .5; 3 SOLUTION RESPIRATORY (INHALATION) at 09:04

## 2019-04-11 NOTE — NURSING TRANSFER
Nursing Transfer Note      4/10/2019     Transfer 1027    Transfer via bed    Transfer with IV pole, tele, O2, pulse ox    Transported by RN, transport    Medicines sent: LR, O2    Chart send with patient: yes    Notified: TOÑA Almonte and son    Patient reassessed at: upon arrival to  The unit

## 2019-04-11 NOTE — ANESTHESIA POSTPROCEDURE EVALUATION
Anesthesia Post Evaluation    Patient: Cleopatra Lozada    Procedure(s) Performed: Procedure(s) (LRB):  ENDARTERECTOMY-CAROTID, RIGHT (Right)    Final Anesthesia Type: general  Patient location during evaluation: PACU  Patient participation: Yes- Able to Participate  Level of consciousness: awake and alert  Post-procedure vital signs: reviewed and stable  Pain management: adequate  Airway patency: patent  PONV status at discharge: No PONV  Anesthetic complications: no      Cardiovascular status: blood pressure returned to baseline  Respiratory status: unassisted  Hydration status: euvolemic  Follow-up not needed.          Vitals Value Taken Time   BP 92/54 4/10/2019 10:31 PM   Temp 36.3 °C (97.3 °F) 4/10/2019  8:44 PM   Pulse 59 4/10/2019 10:45 PM   Resp 20 4/10/2019 10:45 PM   SpO2 93 % 4/10/2019 10:45 PM   Vitals shown include unvalidated device data.      No case tracking events are documented in the log.      Pain/Jie Score: Pain Rating Prior to Med Admin: 7 (4/10/2019  9:44 PM)  Jie Score: 9 (4/10/2019 10:33 PM)

## 2019-04-11 NOTE — NURSING
0445 Spoke with MD Nguyễn regarding patient A-line no longer functioning. MD stated to go ahead and remove A-line    0515 Spoke with MD Nguyễn regarding patient inability to void and bladder scan showing only 200ML. MD stated to give patient more time to attempt to use the restroom.

## 2019-04-11 NOTE — TRANSFER OF CARE
"Anesthesia Transfer of Care Note    Patient: Cleopatra Lozada    Procedure(s) Performed: Procedure(s) (LRB):  ENDARTERECTOMY-CAROTID, RIGHT (Right)    Patient location: PACU    Anesthesia Type: general    Transport from OR: Transported from OR on 6-10 L/min O2 by face mask with adequate spontaneous ventilation    Post pain: adequate analgesia    Post assessment: no apparent anesthetic complications and tolerated procedure well    Post vital signs: stable    Level of consciousness: awake and responds to stimulation    Nausea/Vomiting: no nausea/vomiting    Complications: none    Transfer of care protocol was followed      Last vitals:   Visit Vitals  /69 (BP Location: Right arm, Patient Position: Lying)   Pulse 67   Temp 36.7 °C (98.1 °F) (Oral)   Resp 18   Ht 4' 11" (1.499 m)   Wt 55.3 kg (122 lb)   SpO2 96%   Breastfeeding? No   BMI 24.64 kg/m²     "

## 2019-04-11 NOTE — PLAN OF CARE
Problem: Adult Inpatient Plan of Care  Goal: Plan of Care Review  Outcome: Ongoing (interventions implemented as appropriate)  POC reviewed with patient. Verbalizes understanding. AAOx4. Oxygen saturation has remained between 88% - 94% throughout shift on 2 - 3L NC. Patient pain managed by ordered pain medicine. Patient tolerating diet with no reports of nausea or vomiting. Ambulated in room with x1 assistance. Remained in chair for 4.5 hours this shift. Patient has urinated about 6 mL. MD notified. Incision intact. Patient has no other concerns at this time. Call light within reach. WCTM.

## 2019-04-11 NOTE — BRIEF OP NOTE
[facility]  Brief Operative Note    SUMMARY     Surgery Date: 4/10/2019     Surgeon(s) and Role:     * Pietro Steward MD - Primary      Assisting Surgeon:  * Endy Sparks MD - Fellow    Pre-op Diagnosis:  Stroke [I63.9]    Post-op Diagnosis:  Post-Op Diagnosis Codes:     * Stroke [I63.9]    Procedure:  1. R carotid endarterectomy with patch placement  2. Intraoperative US    Anesthesia: General    Description of Procedure: large calcific plaque     Description of the findings of the procedure: patch placement without stenosis or debris on intraoperative US    Estimated Blood Loss: 100cc         Specimens:   Specimen (12h ago, onward)    Start     Ordered    04/10/19 1902  Specimen to Pathology - Surgery  Once     Comments:  1. Right carotid plaque; permanent     Start Status     04/10/19 1902 Collected (04/10/19 1903) Order ID: [id number]       04/10/19 1902

## 2019-04-11 NOTE — OP NOTE
4/10/2019      Pre-operative Diagnosis:  Symptomatic R ICA stenosis  Post-operative Diagnosis: same.    Procedures:  1. R carotid endarterectomy with patch placement  2. Intraoperative US    Surgeon: Pietro Steward MD    Assistants: Endy Sparks MD    Anesthesia: General    Findings/Key elements:   Large calcific plaque  Patch placement without stenosis or debris on intraoperative US    Implants:   Implant Name Type Inv. Item Serial No.  Lot No. LRB No. Used   GRAFT VAS GUARD 0.8X8CM BOVINE - [id number]  GRAFT VAS GUARD 0.8X8CM BOVINE  [email or web address] [id number] Right 1   KIT SHUNT ARTERY 6 - [id number]  KIT SHUNT ARTERY 6  CARDINAL [id number] Right 1             Procedure Details:  After an informed consent was obtained, the patient was taken to the operating room, placed in supine position with arms tucked. The patients R neck was prepped and draped in sterile fashion. A longitudinal incision was made along the anterior border of the sternocleidomastoid and carried down through subcutaneous fat and fascia using electrocautery. The platysmal muscle was divided. The sternocleidomastoid was retracted laterally. The dissection was continued through the carotid sheath, the internal jugular vein was retracted laterally using self-retaining retractor. The vagus nerve was identified and avoided. The common, internal and external carotids were then dissected free from the surrounding tissue. An umbilical tape and Rumel tourniquet were placed around the common carotid. The facial vein was identified, isolated and divided. The external branch was isolated and a vessel loop was placed around it. The internal branch was isolated and an umbilical tape and Rumel tourniquet was placed around it. The superior thyroid artery was identified and controlled using a vessel loop.  At this point, the patient was systemically anticoagulated with IV Heparin and an activated clotting time (ACT) was checked and  maintained at >250 throughout the case. An 10 Fr Argyl shunt was prepared in standard fashion. The internal carotid artery was controlled with the Rumel tourniquet, the common carotid was controlled with the Rumel tourniquet and the external carotid artery was controlled with a vessel loop. A longitudinal arteriotomy was performed using an 11 blade scalpel and the arteriotomy was extended with Kessler scissors beyond the lesion to the level of normal internal carotid artery. The plaque was noted to be focal and ulcerated. The distal shunt was then placed through the internal carotid artery and allowed to back-bleed to fill the shunt. The proximal end of the shunt was then placed into the common carotid artery and the Rumel tourniquet was again tightened. Doppler evaluation noted excellent flow signals through the argyl shunt. Careful endarterectomy was then performed using a [place] elevator.  The plaque was transected proximally in the common carotid artery. The plaque within the distal internal carotid artery was feathered off to a smooth endpoint. Eversion endarterectomy of the external carotid artery was then performed with adequate backbleeding from the external carotid artery at the completion of the eversion endarterectomy. This was flushed forward with heparinized saline. The endarterectomy surface was irrigated with heparinized saline and all loose debris was removed. The distal endarterectomy endpoint was evaluated and additional tacking sutures were placed with 7-0 Prolene. Next, tapered bovine pericardial patch was then sutured in place using 6-0 Prolene in a continuous fashion.  Prior to completing the suture line, the shunt was removed first from the ICA and backbleeding was allowed to fill the carotid artery and then the ICA was controlled again with the vessel loop. Next the common carotid Rumel shunt was loosened  and the shunt was removed from the CCA which was allowed to forward bleeding and the Rumel  was replaced. The suture line was then completed and tied down to re-establish flow, first of the common and external and then the internal carotid artery. Doppler evaluation noted excellent flow signals through the common, internal and external carotid arteries. The heparin was reversed using protamine, and hemostasis was obtained. Intraoperative US was used to evaluate the patch and noted good flows, with no evidence of flap. The sternocleidomastoid was reapproximated with a single 2-0 vicryl interrupted figure of eight suture. The platysma was then reapproximated using 2-0 Vicryl in continuous fashion and the skin was closed using 4-0 Monocryl in a continuous fashion. Dermabond was applied to the skin, and a gauze dressing placed. The patient was then awakened from anesthesia, extubated and after noting a normal neurologic examination, the patient was taken to the PACU in good condition.      EBL:  100 mL           Complications:  None; patient tolerated the procedure well.           Disposition: Stable to recovery.      Dr. Steward was scrubbed and present for the entire procedure.    Endy Sparks MD   Fellow, Vascular/Endovascular Surgery

## 2019-04-11 NOTE — PLAN OF CARE
POC reviewed with pt, acknowledged understanding. Pt AAO x 4, pt still drowsy. Pt remains free of falls/injuries. Pt on telemetry remains SR. Pt tolerating npo diet. Pt pain controlled with prescribed meds. Pt wearing SCDs. Pt on 2 L NC, denies SOB. Pt's incision site clean, dry, and intact. No acute events throughout shift. No distress noted, will continue to monitor.

## 2019-04-11 NOTE — PLAN OF CARE
Extended Emergency Contact Information  Primary Emergency Contact: Sarah Lozada   [facility]  Home Phone: [phone]  Relation: Relative    Latesha Dias MD  [address] / [place] [address]    No future appointments.    Payor: [employer] MANAGED MEDICARE / Plan: [employer] CHOICES 65 / Product Type: Medicare Advantage /       [facility] [place], [place] - [address]  [address] [facility] [address] [address]  Phone: [phone] Fax: [phone]       04/11/19 1632   Discharge Assessment   Assessment Type Discharge Planning Assessment   Confirmed/corrected address and phone number on facesheet? No   Assessment information obtained from? Medical Record   Communicated expected length of stay with patient/caregiver no   Prior to hospitilization cognitive status: Alert/Oriented   Prior to hospitalization functional status: Assistive Equipment   Current cognitive status: Alert/Oriented   Current Functional Status: Assistive Equipment   Lives With alone   Able to Return to Prior Arrangements yes   Is patient able to care for self after discharge? Unable to determine at this time (comments)   Readmission Within the Last 30 Days previous discharge plan unsuccessful   Patient currently being followed by outpatient case management? No   Patient currently receives any other outside agency services? Yes   Name and contact number of agency or person providing outside services [facility] per PHN   Is it the patient/care giver preference to resume care with the current outside agency? Yes   Equipment Currently Used at Home cane, quad   Do you have any problems affording any of your prescribed medications? No   Is the patient taking medications as prescribed? yes   Does the patient have transportation home? Yes   Transportation Anticipated family or friend will provide   Does the patient receive services at the [facility]? No   Discharge Plan A Home Health;Home    Discharge Plan B Skilled Nursing Facility   DME Needed Upon Discharge  walker, rolling;bedside commode   Patient/Family in Agreement with Plan unable to assess

## 2019-04-12 ENCOUNTER — CLINICAL SUPPORT (OUTPATIENT)
Dept: SMOKING CESSATION | Facility: CLINIC | Age: 79
DRG: 038 | End: 2019-04-12
Attending: SURGERY
Payer: COMMERCIAL

## 2019-04-12 DIAGNOSIS — F17.200 NICOTINE DEPENDENCE: Primary | ICD-10-CM

## 2019-04-12 DIAGNOSIS — I65.21 CAROTID STENOSIS, SYMPTOMATIC W/O INFARCT, RIGHT: Primary | ICD-10-CM

## 2019-04-12 LAB
ANION GAP SERPL CALC-SCNC: 9 MMOL/L (ref 8–16)
BASOPHILS # BLD AUTO: 0.01 K/UL (ref 0–0.2)
BASOPHILS NFR BLD: 0.1 % (ref 0–1.9)
BUN SERPL-MCNC: 15 MG/DL (ref 8–23)
CALCIUM SERPL-MCNC: 8.5 MG/DL (ref 8.7–10.5)
CHLORIDE SERPL-SCNC: 108 MMOL/L (ref 95–110)
CO2 SERPL-SCNC: 21 MMOL/L (ref 23–29)
CREAT SERPL-MCNC: 1.2 MG/DL (ref 0.5–1.4)
DIFFERENTIAL METHOD: ABNORMAL
EOSINOPHIL # BLD AUTO: 0.3 K/UL (ref 0–0.5)
EOSINOPHIL NFR BLD: 3.7 % (ref 0–8)
ERYTHROCYTE [DISTWIDTH] IN BLOOD BY AUTOMATED COUNT: 14.1 % (ref 11.5–14.5)
EST. GFR  (AFRICAN AMERICAN): 49.7 ML/MIN/1.73 M^2
EST. GFR  (NON AFRICAN AMERICAN): 43.1 ML/MIN/1.73 M^2
GLUCOSE SERPL-MCNC: 113 MG/DL (ref 70–110)
HCT VFR BLD AUTO: 32.3 % (ref 37–48.5)
HGB BLD-MCNC: 9.7 G/DL (ref 12–16)
IMM GRANULOCYTES # BLD AUTO: 0.03 K/UL (ref 0–0.04)
IMM GRANULOCYTES NFR BLD AUTO: 0.3 % (ref 0–0.5)
LYMPHOCYTES # BLD AUTO: 1.4 K/UL (ref 1–4.8)
LYMPHOCYTES NFR BLD: 16.1 % (ref 18–48)
MCH RBC QN AUTO: 27.6 PG (ref 27–31)
MCHC RBC AUTO-ENTMCNC: 30 G/DL (ref 32–36)
MCV RBC AUTO: 92 FL (ref 82–98)
MONOCYTES # BLD AUTO: 0.7 K/UL (ref 0.3–1)
MONOCYTES NFR BLD: 7.8 % (ref 4–15)
NEUTROPHILS # BLD AUTO: 6.2 K/UL (ref 1.8–7.7)
NEUTROPHILS NFR BLD: 72 % (ref 38–73)
NRBC BLD-RTO: 0 /100 WBC
PLATELET # BLD AUTO: 218 K/UL (ref 150–350)
PMV BLD AUTO: 10.7 FL (ref 9.2–12.9)
POTASSIUM SERPL-SCNC: 3.6 MMOL/L (ref 3.5–5.1)
RBC # BLD AUTO: 3.52 M/UL (ref 4–5.4)
SODIUM SERPL-SCNC: 138 MMOL/L (ref 136–145)
WBC # BLD AUTO: 8.68 K/UL (ref 3.9–12.7)

## 2019-04-12 PROCEDURE — 80048 BASIC METABOLIC PNL TOTAL CA: CPT

## 2019-04-12 PROCEDURE — 85025 COMPLETE CBC W/AUTO DIFF WBC: CPT

## 2019-04-12 PROCEDURE — 25000003 PHARM REV CODE 250: Performed by: STUDENT IN AN ORGANIZED HEALTH CARE EDUCATION/TRAINING PROGRAM

## 2019-04-12 PROCEDURE — 99407 PR TOBACCO USE CESSATION INTENSIVE >10 MINUTES: ICD-10-PCS | Mod: S$GLB,,,

## 2019-04-12 PROCEDURE — 99407 BEHAV CHNG SMOKING > 10 MIN: CPT | Mod: S$GLB,,,

## 2019-04-12 PROCEDURE — 97165 OT EVAL LOW COMPLEX 30 MIN: CPT

## 2019-04-12 PROCEDURE — 20600001 HC STEP DOWN PRIVATE ROOM

## 2019-04-12 PROCEDURE — 94761 N-INVAS EAR/PLS OXIMETRY MLT: CPT

## 2019-04-12 PROCEDURE — 36415 COLL VENOUS BLD VENIPUNCTURE: CPT

## 2019-04-12 RX ADMIN — ASPIRIN 81 MG: 81 TABLET, COATED ORAL at 09:04

## 2019-04-12 RX ADMIN — METOPROLOL SUCCINATE 25 MG: 25 TABLET, EXTENDED RELEASE ORAL at 09:04

## 2019-04-12 RX ADMIN — CLOPIDOGREL BISULFATE 75 MG: 75 TABLET, FILM COATED ORAL at 09:04

## 2019-04-12 RX ADMIN — ACETAMINOPHEN 650 MG: 325 TABLET ORAL at 07:04

## 2019-04-12 RX ADMIN — LOSARTAN POTASSIUM 50 MG: 50 TABLET, FILM COATED ORAL at 09:04

## 2019-04-12 RX ADMIN — ATORVASTATIN CALCIUM 80 MG: 20 TABLET, FILM COATED ORAL at 09:04

## 2019-04-12 RX ADMIN — ACETAMINOPHEN 650 MG: 325 TABLET ORAL at 08:04

## 2019-04-12 NOTE — PT/OT/SLP EVAL
Occupational Therapy   Evaluation    Name: Cleopatra Lozada  MRN: [id number]  Admitting Diagnosis:  H/O carotid endarterectomy 2 Days Post-Op    Recommendations:     Discharge Recommendations: nursing facility, skilled(short stay)  Discharge Equipment Recommendations:  none  Barriers to discharge:  None    Assessment:     Cleopatra Lozada is a 78 y.o. female with a medical diagnosis of H/O carotid endarterectomy.  She presents with weakness and balance deficits impairing safe and independent mobility and self care. Pt would benefit from SS SNF due to balance deficits and unsafe mobility as well as pt lives alone with no assist. Performance deficits affecting function: weakness, impaired endurance, impaired balance, impaired self care skills, impaired functional mobilty, gait instability.      Rehab Prognosis: Good; patient would benefit from acute skilled OT services to address these deficits and reach maximum level of function.       Plan:     Patient to be seen 3 x/week to address the above listed problems via self-care/home management, therapeutic activities, therapeutic exercises  · Plan of Care Expires: 05/12/19  · Plan of Care Reviewed with: patient    Subjective     Chief Complaint: mobility is not at baseline  Patient/Family Comments/goals: return to PLOF, improve balance/mobility    Occupational Profile:  Living Environment: Pt lives alone in 1SH with threshold CLAUDIA. Home has tub/shower with grab bars  Previous level of function: PTA, pt reports independence with all ADL and IADL, including driving. Pt reports completing yard work at home. She recently began using SPC for ambulation prior to sx.   Roles and Routines: n/a  Equipment Used at Home:  cane, straight, grab bar  Assistance upon Discharge: Pt lives alone, limited assistance    Pain/Comfort:  · Pain Rating 1: 0/10  · Pain Addressed 1: Reposition, Distraction, Cessation of Activity  · Pain Rating Post-Intervention 1: 0/10    Patients cultural,  spiritual, Gnosticism conflicts given the current situation: no    Objective:     Communicated with: RN prior to session.  Patient found HOB elevated with oxygen, telemetry, pulse ox (continuous) upon OT entry to room.    General Precautions: Standard, fall   Orthopedic Precautions:N/A   Braces: N/A     Occupational Performance:    Bed Mobility:    · Patient completed Scooting/Bridging with contact guard assistance  · Patient completed Supine to Sit with contact guard assistance    Functional Mobility/Transfers:  · Patient completed Sit <> Stand Transfer with contact guard assistance with straight cane and HHA  · Patient completed Bed <> Chair Transfer using Step Transfer technique with contact guard assistance with rolling walker  · Patient completed Toilet Transfer Step Transfer technique with contact guard assistance with  grab bars and straight cane  · Functional Mobility: Pt ambulate 15 ft with min A using SPC and HHA; pt ambulate additional 100 ft with CGA using RW, pt reporting she felt more stable  · Pt demo 1 LOB with static standing at EOB, balance improved with use of RW >SPC, however pt feels mobility is still not at baseline    Activities of Daily Living:  · Grooming: contact guard assistance standing at sink to wash hands  · Toileting: stand by assistance for clothing management and hygiene    Cognitive/Visual Perceptual:  Cognitive/Psychosocial Skills:     -       Oriented to: Person, Place, Time and Situation   -       Follows Commands/attention:Follows multistep  commands  -       Communication: clear/fluent  -       Memory: No Deficits noted  -       Safety awareness/insight to disability: intact   -       Mood/Affect/Coping skills/emotional control: Appropriate to situation  Visual/Perceptual:     Pt wears contacts/glasses; hearing intact    Physical Exam:  Balance:    -       good sitting balance; fair standing balance  Postural examination/scapula alignment:    -       Rounded shoulders  -        Forward head  Skin integrity: Visible skin intact  Edema:  None noted  Sensation:    -       Intact  Dominant hand:    -       R hand  Upper Extremity Range of Motion:     -       Right Upper Extremity: WFL  -       Left Upper Extremity: WFL  Upper Extremity Strength:    -       Right Upper Extremity: WFL  -       Left Upper Extremity: WFL   Strength:    -       Right Upper Extremity: WFL  -       Left Upper Extremity: WFL  Fine Motor Coordination:    -       Intact  Gross motor coordination:   WFL    AMPAC 6 Click ADL:  AMPAC Total Score: 17    Treatment & Education:  Pt educated on role of OT/POC  Pt educated on importance of ambulation/UIC  White board/communication board updated  Education:    Patient left up in chair with all lines intact and call button in reach    GOALS:   Multidisciplinary Problems     Occupational Therapy Goals        Problem: Occupational Therapy Goal    Goal Priority Disciplines Outcome Interventions   Occupational Therapy Goal     OT, PT/OT Ongoing (interventions implemented as appropriate)    Description:  Goals to be met by: 4/26/19     Patient will increase functional independence with ADLs by performing:    UE Dressing with Set-up Assistance.  LE Dressing with Set-up Assistance.  Grooming while standing at sink with Supervision.  Toileting from toilet with Supervision for hygiene and clothing management.   Toilet transfer to toilet with Supervision.                      History:     Past Medical History:   Diagnosis Date    Aneurysm     abdominal, repaired 11/15/2011    Cigarette smoker     patient reports buying loose tobacco and rolling with a machine, at home    Hypertension     TIA (transient ischemic attack)     patient states 3rd episode       Past Surgical History:   Procedure Laterality Date    ABDOMINAL AORTIC ANEURYSM REPAIR  11/15/2011    ENDARTERECTOMY-CAROTID, RIGHT Right 4/10/2019    [facility]    TONSILLECTOMY          Time Tracking:     OT Date of Treatment: 04/12/19  OT Start Time: 0831  OT Stop Time: 0849  OT Total Time (min): 18 min    Billable Minutes:Evaluation 18    Nieves Triplett OT  4/12/2019

## 2019-04-12 NOTE — ASSESSMENT & PLAN NOTE
Patient is 1 Day Post-Op from R carotid endarterectomy.     - stepdown from PCU to floor  - tolerating regular diet  - a-line out  - chaudhry out, monitor UOP  - Trend BUN/Cr  - OOB to chair  - PT/OT

## 2019-04-12 NOTE — PROGRESS NOTES
[facility]  Vascular Surgery  Progress Note    Patient Name: Cleopatra Lozada  MRN: [id number]  Admission Date: 4/10/2019  Primary Care Provider: Latesha Dias MD    Subjective:     Interval History: NAEON.  Didn't walk much.  On 3L NC this AM    Post-Op Info:  Procedure(s) (LRB):  ENDARTERECTOMY-CAROTID, RIGHT (Right)   2 Days Post-Op       Medications:  Continuous Infusions:  Scheduled Meds:   aspirin  81 mg Oral Daily    atorvastatin  80 mg Oral Daily    clopidogrel  75 mg Oral Daily    losartan  50 mg Oral Daily    metoprolol succinate  25 mg Oral Daily    mupirocin  1 g Nasal BID     PRN Meds:acetaminophen, albuterol-ipratropium, hydrALAZINE, labetalol, oxyCODONE, oxyCODONE     Objective:     Vital Signs (Most Recent):  Temp: 98.8 °F (37.1 °C) (04/12/19 0745)  Pulse: 83 (04/12/19 1056)  Resp: 16 (04/12/19 0745)  BP: (!) 140/67 (04/12/19 0745)  SpO2: 97 % (04/12/19 1056) Vital Signs (24h Range):  Temp:  [97.9 °F (36.6 °C)-98.8 °F (37.1 °C)] 98.8 °F (37.1 °C)  Pulse:  [71-86] 83  Resp:  [14-18] 16  SpO2:  [90 %-97 %] 97 %  BP: (133-158)/(66-80) 140/67         Physical Exam   Constitutional: She is oriented to person, place, and time. She appears well-developed.   Neck: Normal range of motion. Neck supple. No JVD present. No tracheal deviation present.   Surgical island dressing to right neck clean, dry, and intact   Cardiovascular: Normal rate and regular rhythm.   Pulmonary/Chest: Breath sounds normal. No respiratory distress.   Abdominal: Soft. She exhibits no distension and no mass. There is no tenderness. There is no rebound and no guarding.   Musculoskeletal: She exhibits no edema.   Neurological: She is alert and oriented to person, place, and time. No cranial nerve deficit or sensory deficit.   Skin: Skin is warm and dry.       Significant Labs:  CBC:   Recent Labs   Lab 04/12/19  0444   WBC 8.68   RBC 3.52*   HGB 9.7*   HCT 32.3*      MCV 92   MCH 27.6   MCHC 30.0*      CMP:   Recent Labs   Lab 04/12/19  0444   *   CALCIUM 8.5*      K 3.6   CO2 21*      BUN 15   CREATININE 1.2       Significant Diagnostics:  I have reviewed all pertinent imaging results/findings within the past 24 hours.    Assessment/Plan:     * H/O carotid endarterectomy  Patient is 2 Days Post-Op from R carotid endarterectomy.     - tolerating regular diet  - wean supplemental O2  - chaudhry out, monitor UOP  - Trend BUN/Cr  - OOB to chair  - PT/OT eval today           Lm Carroll MD  Vascular Surgery  [facility]

## 2019-04-12 NOTE — ASSESSMENT & PLAN NOTE
Patient is 2 Days Post-Op from R carotid endarterectomy.     - tolerating regular diet  - wean supplemental O2  - chaudhry out, monitor UOP  - Trend BUN/Cr  - OOB to chair  - PT/OT eval today

## 2019-04-12 NOTE — PLAN OF CARE
Problem: Occupational Therapy Goal  Goal: Occupational Therapy Goal  Goals to be met by: 4/26/19     Patient will increase functional independence with ADLs by performing:    UE Dressing with Set-up Assistance.  LE Dressing with Set-up Assistance.  Grooming while standing at sink with Supervision.  Toileting from toilet with Supervision for hygiene and clothing management.   Toilet transfer to toilet with Supervision.    Outcome: Ongoing (interventions implemented as appropriate)  Eval completed, goals established    Comments: Initiate OT NAOMI Triplett OT  4/12/2019

## 2019-04-12 NOTE — SUBJECTIVE & OBJECTIVE
Medications:  Continuous Infusions:  Scheduled Meds:   aspirin  81 mg Oral Daily    atorvastatin  80 mg Oral Daily    clopidogrel  75 mg Oral Daily    losartan  50 mg Oral Daily    metoprolol succinate  25 mg Oral Daily    mupirocin  1 g Nasal BID     PRN Meds:acetaminophen, albuterol-ipratropium, hydrALAZINE, labetalol, oxyCODONE, oxyCODONE     Objective:     Vital Signs (Most Recent):  Temp: 98.8 °F (37.1 °C) (04/12/19 0745)  Pulse: 83 (04/12/19 1056)  Resp: 16 (04/12/19 0745)  BP: (!) 140/67 (04/12/19 0745)  SpO2: 97 % (04/12/19 1056) Vital Signs (24h Range):  Temp:  [97.9 °F (36.6 °C)-98.8 °F (37.1 °C)] 98.8 °F (37.1 °C)  Pulse:  [71-86] 83  Resp:  [14-18] 16  SpO2:  [90 %-97 %] 97 %  BP: (133-158)/(66-80) 140/67         Physical Exam   Constitutional: She is oriented to person, place, and time. She appears well-developed.   Neck: Normal range of motion. Neck supple. No JVD present. No tracheal deviation present.   Surgical island dressing to right neck clean, dry, and intact   Cardiovascular: Normal rate and regular rhythm.   Pulmonary/Chest: Breath sounds normal. No respiratory distress.   Abdominal: Soft. She exhibits no distension and no mass. There is no tenderness. There is no rebound and no guarding.   Musculoskeletal: She exhibits no edema.   Neurological: She is alert and oriented to person, place, and time. No cranial nerve deficit or sensory deficit.   Skin: Skin is warm and dry.       Significant Labs:  CBC:   Recent Labs   Lab 04/12/19  0444   WBC 8.68   RBC 3.52*   HGB 9.7*   HCT 32.3*      MCV 92   MCH 27.6   MCHC 30.0*     CMP:   Recent Labs   Lab 04/12/19  0444   *   CALCIUM 8.5*      K 3.6   CO2 21*      BUN 15   CREATININE 1.2       Significant Diagnostics:  I have reviewed all pertinent imaging results/findings within the past 24 hours.

## 2019-04-12 NOTE — PLAN OF CARE
04/12/19 1537   Post-Acute Status   Post-Acute Authorization Placement   Post-Acute Placement Status Referrals Sent       Pt wants to go to [facility]. [facility]  SW in contact with CM and Medical staff. Will continue to follow and offer support as needed.     Jeff Devine, LUCY  [facility]   Ext. [phone]

## 2019-04-12 NOTE — PROGRESS NOTES
[facility]  Vascular Surgery  Progress Note    Patient Name: Cleopatra Lozada  MRN: [id number]  Admission Date: 4/10/2019  Primary Care Provider: Latesha Dias MD    Subjective:     Interval History: No acute events overnight.  A line removed because stopped functioning. [place] out, minimal UOP afterwards. Tolerating regular diet. AF, VSS.     Post-Op Info:  Procedure(s) (LRB):  ENDARTERECTOMY-CAROTID, RIGHT (Right)   1 Day Post-Op       Medications:  Continuous Infusions:  Scheduled Meds:   aspirin  81 mg Oral Daily    atorvastatin  80 mg Oral Daily    clopidogrel  75 mg Oral Daily    [START ON 4/12/2019] losartan  50 mg Oral Daily    [START ON 4/12/2019] metoprolol succinate  25 mg Oral Daily    mupirocin  1 g Nasal BID     PRN Meds:acetaminophen, albuterol-ipratropium, hydrALAZINE, labetalol, oxyCODONE, oxyCODONE     Objective:     Vital Signs (Most Recent):  Temp: 98.1 °F (36.7 °C) (04/11/19 1910)  Pulse: 76 (04/11/19 1941)  Resp: 18 (04/11/19 1910)  BP: (!) 155/70 (04/11/19 1910)  SpO2: (!) 91 % (04/11/19 1941) Vital Signs (24h Range):  Temp:  [97.3 °F (36.3 °C)-98.7 °F (37.1 °C)] 98.1 °F (36.7 °C)  Pulse:  [53-81] 76  Resp:  [14-23] 18  SpO2:  [87 %-95 %] 91 %  BP: ()/(48-70) 155/70  Arterial Line BP: ()/(41-51) 92/41     Date 04/11/19 0700 - 04/12/19 0659   Shift 3454-1616 8978-2018 8266-0898 24 Hour Total   INTAKE   P.O. 290 60  350   I.V.(mL/kg) 550(9.9)   550(9.9)   Shift Total(mL/kg) 840(15.2) 60(1.1)  900(16.3)   OUTPUT   Urine(mL/kg/hr) 10(0) 150  160   Shift Total(mL/kg) 10(0.2) 150(2.7)  160(2.9)   Weight (kg) 55.3 55.3 55.3 55.3       Physical Exam   Constitutional: She is oriented to person, place, and time. She appears well-developed.   Neck: Normal range of motion. Neck supple. No JVD present. No tracheal deviation present.   Surgical island dressing to right neck clean, dry, and intact   Cardiovascular: Normal rate and regular rhythm.   Pulmonary/Chest:  Breath sounds normal. No respiratory distress.   Abdominal: Soft. She exhibits no distension and no mass. There is no tenderness. There is no rebound and no guarding.   Musculoskeletal: She exhibits no edema.   Neurological: She is alert and oriented to person, place, and time. No cranial nerve deficit or sensory deficit.   Skin: Skin is warm and dry.       Significant Labs:  CBC:   Recent Labs   Lab 04/11/19  0539   WBC 7.88   RBC 3.54*   HGB 10.0*   HCT 32.3*      MCV 91   MCH 28.2   MCHC 31.0*     CMP:   Recent Labs   Lab 04/11/19  0539   *   CALCIUM 8.1*      K 3.8   CO2 21*   *   BUN 14   CREATININE 1.3       Significant Diagnostics:  I have reviewed all pertinent imaging results/findings within the past 24 hours.    Assessment/Plan:     * H/O carotid endarterectomy  Patient is 1 Day Post-Op from R carotid endarterectomy.     - stepdown from PCU to floor  - tolerating regular diet  - a-line out  - chaudhry out, monitor UOP  - Trend BUN/Cr  - OOB to chair  - PT/OT           Megan Cole MD  Vascular Surgery  [facility]

## 2019-04-13 LAB
ANION GAP SERPL CALC-SCNC: 11 MMOL/L (ref 8–16)
BASOPHILS # BLD AUTO: 0.01 K/UL (ref 0–0.2)
BASOPHILS NFR BLD: 0.1 % (ref 0–1.9)
BUN SERPL-MCNC: 13 MG/DL (ref 8–23)
CALCIUM SERPL-MCNC: 8.3 MG/DL (ref 8.7–10.5)
CHLORIDE SERPL-SCNC: 109 MMOL/L (ref 95–110)
CO2 SERPL-SCNC: 21 MMOL/L (ref 23–29)
CREAT SERPL-MCNC: 1.1 MG/DL (ref 0.5–1.4)
DIFFERENTIAL METHOD: ABNORMAL
EOSINOPHIL # BLD AUTO: 0.3 K/UL (ref 0–0.5)
EOSINOPHIL NFR BLD: 3.9 % (ref 0–8)
ERYTHROCYTE [DISTWIDTH] IN BLOOD BY AUTOMATED COUNT: 14.1 % (ref 11.5–14.5)
EST. GFR  (AFRICAN AMERICAN): 55.2 ML/MIN/1.73 M^2
EST. GFR  (NON AFRICAN AMERICAN): 47.9 ML/MIN/1.73 M^2
GLUCOSE SERPL-MCNC: 85 MG/DL (ref 70–110)
HCT VFR BLD AUTO: 30.3 % (ref 37–48.5)
HGB BLD-MCNC: 9.5 G/DL (ref 12–16)
IMM GRANULOCYTES # BLD AUTO: 0.02 K/UL (ref 0–0.04)
IMM GRANULOCYTES NFR BLD AUTO: 0.3 % (ref 0–0.5)
LYMPHOCYTES # BLD AUTO: 1.4 K/UL (ref 1–4.8)
LYMPHOCYTES NFR BLD: 19.3 % (ref 18–48)
MCH RBC QN AUTO: 28.3 PG (ref 27–31)
MCHC RBC AUTO-ENTMCNC: 31.4 G/DL (ref 32–36)
MCV RBC AUTO: 90 FL (ref 82–98)
MONOCYTES # BLD AUTO: 0.6 K/UL (ref 0.3–1)
MONOCYTES NFR BLD: 8.3 % (ref 4–15)
NEUTROPHILS # BLD AUTO: 4.9 K/UL (ref 1.8–7.7)
NEUTROPHILS NFR BLD: 68.1 % (ref 38–73)
NRBC BLD-RTO: 0 /100 WBC
PLATELET # BLD AUTO: 227 K/UL (ref 150–350)
PMV BLD AUTO: 11.6 FL (ref 9.2–12.9)
POTASSIUM SERPL-SCNC: 4 MMOL/L (ref 3.5–5.1)
RBC # BLD AUTO: 3.36 M/UL (ref 4–5.4)
SODIUM SERPL-SCNC: 141 MMOL/L (ref 136–145)
WBC # BLD AUTO: 7.15 K/UL (ref 3.9–12.7)

## 2019-04-13 PROCEDURE — 63600175 PHARM REV CODE 636 W HCPCS: Performed by: STUDENT IN AN ORGANIZED HEALTH CARE EDUCATION/TRAINING PROGRAM

## 2019-04-13 PROCEDURE — 93010 ELECTROCARDIOGRAM REPORT: CPT | Mod: ,,, | Performed by: INTERNAL MEDICINE

## 2019-04-13 PROCEDURE — 36415 COLL VENOUS BLD VENIPUNCTURE: CPT

## 2019-04-13 PROCEDURE — 97161 PT EVAL LOW COMPLEX 20 MIN: CPT

## 2019-04-13 PROCEDURE — 25000003 PHARM REV CODE 250: Performed by: STUDENT IN AN ORGANIZED HEALTH CARE EDUCATION/TRAINING PROGRAM

## 2019-04-13 PROCEDURE — 93010 EKG 12-LEAD: ICD-10-PCS | Mod: ,,, | Performed by: INTERNAL MEDICINE

## 2019-04-13 PROCEDURE — 80048 BASIC METABOLIC PNL TOTAL CA: CPT

## 2019-04-13 PROCEDURE — 93005 ELECTROCARDIOGRAM TRACING: CPT

## 2019-04-13 PROCEDURE — 85025 COMPLETE CBC W/AUTO DIFF WBC: CPT

## 2019-04-13 PROCEDURE — 20600001 HC STEP DOWN PRIVATE ROOM

## 2019-04-13 RX ORDER — HEPARIN SODIUM 5000 [USP'U]/ML
5000 INJECTION, SOLUTION INTRAVENOUS; SUBCUTANEOUS EVERY 8 HOURS
Status: DISCONTINUED | OUTPATIENT
Start: 2019-04-13 | End: 2019-04-16 | Stop reason: HOSPADM

## 2019-04-13 RX ADMIN — ACETAMINOPHEN 650 MG: 325 TABLET ORAL at 07:04

## 2019-04-13 RX ADMIN — CLOPIDOGREL BISULFATE 75 MG: 75 TABLET, FILM COATED ORAL at 09:04

## 2019-04-13 RX ADMIN — HEPARIN SODIUM 5000 UNITS: 5000 INJECTION, SOLUTION INTRAVENOUS; SUBCUTANEOUS at 09:04

## 2019-04-13 RX ADMIN — ASPIRIN 81 MG: 81 TABLET, COATED ORAL at 09:04

## 2019-04-13 RX ADMIN — ATORVASTATIN CALCIUM 80 MG: 20 TABLET, FILM COATED ORAL at 09:04

## 2019-04-13 RX ADMIN — LOSARTAN POTASSIUM 50 MG: 50 TABLET, FILM COATED ORAL at 09:04

## 2019-04-13 RX ADMIN — HEPARIN SODIUM 5000 UNITS: 5000 INJECTION, SOLUTION INTRAVENOUS; SUBCUTANEOUS at 04:04

## 2019-04-13 RX ADMIN — HYDRALAZINE HYDROCHLORIDE 10 MG: 20 INJECTION INTRAMUSCULAR; INTRAVENOUS at 04:04

## 2019-04-13 RX ADMIN — METOPROLOL SUCCINATE 25 MG: 25 TABLET, EXTENDED RELEASE ORAL at 09:04

## 2019-04-13 NOTE — NURSING
While walking the patient to the bathroom, received a phone call that patient had a brief run of SVT at 189bpm.  Patient was asymptomatic, no chest pain, sob, or dizziness.  Patient's vitals stable at 132/73, o2 sats 93% on room air, NSR w/frequent PACs at 77 on monitor.  Notified Dr. Cardona.  Stat EKG ordered.  Patient has no complaints at this time.

## 2019-04-13 NOTE — SUBJECTIVE & OBJECTIVE
Medications:  Continuous Infusions:  Scheduled Meds:   aspirin  81 mg Oral Daily    atorvastatin  80 mg Oral Daily    clopidogrel  75 mg Oral Daily    losartan  50 mg Oral Daily    metoprolol succinate  25 mg Oral Daily    mupirocin  1 g Nasal BID     PRN Meds:acetaminophen, albuterol-ipratropium, hydrALAZINE, labetalol, oxyCODONE, oxyCODONE     Objective:     Vital Signs (Most Recent):  Temp: 97.6 °F (36.4 °C) (04/13/19 0319)  Pulse: 80 (04/13/19 0908)  Resp: 16 (04/13/19 0319)  BP: (!) 159/73 (04/13/19 0908)  SpO2: (!) 92 % (04/13/19 0715) Vital Signs (24h Range):  Temp:  [97.6 °F (36.4 °C)-98.4 °F (36.9 °C)] 97.6 °F (36.4 °C)  Pulse:  [56-83] 80  Resp:  [16-18] 16  SpO2:  [86 %-97 %] 92 %  BP: (121-159)/(61-73) 159/73         Physical Exam   Constitutional: She is oriented to person, place, and time. She appears well-developed.   Neck: Normal range of motion. Neck supple. No JVD present. No tracheal deviation present.   Surgical island dressing to right neck clean, dry, and intact   Cardiovascular: Normal rate and regular rhythm.   Pulmonary/Chest: Breath sounds normal. No respiratory distress.   Abdominal: Soft. She exhibits no distension and no mass. There is no tenderness. There is no rebound and no guarding.   Musculoskeletal: She exhibits no edema.   Neurological: She is alert and oriented to person, place, and time. No cranial nerve deficit or sensory deficit.   Skin: Skin is warm and dry.       Significant Labs:  CBC:   Recent Labs   Lab 04/13/19 0355   WBC 7.15   RBC 3.36*   HGB 9.5*   HCT 30.3*      MCV 90   MCH 28.3   MCHC 31.4*     CMP:   Recent Labs   Lab 04/13/19 0355   GLU 85   CALCIUM 8.3*      K 4.0   CO2 21*      BUN 13   CREATININE 1.1       Significant Diagnostics:  I have reviewed all pertinent imaging results/findings within the past 24 hours.

## 2019-04-13 NOTE — ASSESSMENT & PLAN NOTE
Patient is 3 Days Post-Op from R carotid endarterectomy.     - tolerating regular diet  - wean supplemental O2  - OOB to chair  - awaiting Placement at [facility]. Suspect dc on Monday.

## 2019-04-13 NOTE — PLAN OF CARE
Problem: Physical Therapy Goal  Goal: Physical Therapy Goal  Goals to be met by: 2019     Patient will increase functional independence with mobility by performin. Supine to sit with Modified [place]  2. Sit to supine with Modified [place]  3. Sit to stand transfer with Modified [place]  4. Bed to chair transfer with Modified [place] with/without Rolling Walker  5. Gait  x 300 feet with Modified [place] using Rolling Walker.   6. Lower extremity exercise program x15 reps per handout, with independence    Outcome: Ongoing (interventions implemented as appropriate)  Goals set

## 2019-04-13 NOTE — PROGRESS NOTES
[facility]  Vascular Surgery  Progress Note    Patient Name: Cleopatra Lozada  MRN: [id number]  Admission Date: 4/10/2019  Primary Care Provider: Latesha Dias MD    Subjective:     Interval History: no acute events overnight.     Post-Op Info:  Procedure(s) (LRB):  ENDARTERECTOMY-CAROTID, RIGHT (Right)   3 Days Post-Op       Medications:  Continuous Infusions:  Scheduled Meds:   aspirin  81 mg Oral Daily    atorvastatin  80 mg Oral Daily    clopidogrel  75 mg Oral Daily    losartan  50 mg Oral Daily    metoprolol succinate  25 mg Oral Daily    mupirocin  1 g Nasal BID     PRN Meds:acetaminophen, albuterol-ipratropium, hydrALAZINE, labetalol, oxyCODONE, oxyCODONE     Objective:     Vital Signs (Most Recent):  Temp: 97.6 °F (36.4 °C) (04/13/19 0319)  Pulse: 80 (04/13/19 0908)  Resp: 16 (04/13/19 0319)  BP: (!) 159/73 (04/13/19 0908)  SpO2: (!) 92 % (04/13/19 0715) Vital Signs (24h Range):  Temp:  [97.6 °F (36.4 °C)-98.4 °F (36.9 °C)] 97.6 °F (36.4 °C)  Pulse:  [56-83] 80  Resp:  [16-18] 16  SpO2:  [86 %-97 %] 92 %  BP: (121-159)/(61-73) 159/73         Physical Exam   Constitutional: She is oriented to person, place, and time. She appears well-developed.   Neck: Normal range of motion. Neck supple. No JVD present. No tracheal deviation present.   Surgical island dressing to right neck clean, dry, and intact   Cardiovascular: Normal rate and regular rhythm.   Pulmonary/Chest: Breath sounds normal. No respiratory distress.   Abdominal: Soft. She exhibits no distension and no mass. There is no tenderness. There is no rebound and no guarding.   Musculoskeletal: She exhibits no edema.   Neurological: She is alert and oriented to person, place, and time. No cranial nerve deficit or sensory deficit.   Skin: Skin is warm and dry.       Significant Labs:  CBC:   Recent Labs   Lab 04/13/19  0355   WBC 7.15   RBC 3.36*   HGB 9.5*   HCT 30.3*      MCV 90   MCH 28.3   MCHC 31.4*     CMP:   Recent  Labs   Lab 04/13/19  0355   GLU 85   CALCIUM 8.3*      K 4.0   CO2 21*      BUN 13   CREATININE 1.1       Significant Diagnostics:  I have reviewed all pertinent imaging results/findings within the past 24 hours.    Assessment/Plan:     * H/O carotid endarterectomy  Patient is 3 Days Post-Op from R carotid endarterectomy.     - tolerating regular diet  - wean supplemental O2  - OOB to chair  - awaiting Placement at [facility]. Suspect dc on Monday.           Ruben Cardona MD  Vascular Surgery  [facility]

## 2019-04-13 NOTE — PT/OT/SLP EVAL
Physical Therapy Evaluation    Patient Name:  Cleopatra Lozada   MRN:  [id number]    Recommendations:     Discharge Recommendations:  nursing facility, skilled(progressing towards  PT)   Discharge Equipment Recommendations: walker, rolling   Barriers to discharge: Decreased caregiver support(lives alone)    Assessment:     Cleopatra Lozada is a 78 y.o. female admitted with a medical diagnosis of H/O carotid endarterectomy.  She presents with the following impairments/functional limitations:  weakness, impaired endurance, impaired functional mobilty, impaired balance Pt. cooperative and tolerated treatment well. Pt. progressing with mobility.    Rehab Prognosis: Good; patient would benefit from acute skilled PT services to address these deficits and reach maximum level of function.    Recent Surgery: Procedure(s) (LRB):  ENDARTERECTOMY-CAROTID, RIGHT (Right) 3 Days Post-Op    Plan:     During this hospitalization, patient to be seen 4 x/week to address the identified rehab impairments via gait training, therapeutic activities, therapeutic exercises and progress toward the following goals:    · Plan of Care Expires:  05/13/19    Subjective     Chief Complaint: deconditioned  Patient/Family Comments/goals: to get stronger  Pain/Comfort:  · Pain Rating 1: 0/10  · Pain Rating Post-Intervention 1: 0/10    Patients cultural, spiritual, Tenriism conflicts given the current situation: no    Living Environment:  Pt. Lives alone in [facility] with no CLAUDIA  Prior to admission, patients level of function was indep.  Equipment used at home: cane, straight, grab bar.  Upon discharge, patient will not have consistent assistance.    Objective:     Communicated with nursing prior to session.  Patient found up in chair with pulse ox (continuous), telemetry, peripheral IV  upon PT entry to room.    General Precautions: Standard, fall   Orthopedic Precautions:N/A   Braces:       Exams:  · RUE ROM: WFL  · RUE Strength: WFL  · LUE ROM:  WFL  · LUE Strength: WFL  · RLE ROM: WFL  · RLE Strength: WFL  · LLE ROM: WFL  · LLE Strength: WFL    Functional Mobility:  · Transfers:     · Sit to Stand:  stand by assistance with rolling walker  · Bed to Chair: stand by assistance with  rolling walker  using  Stand Pivot  · Gait: 200' with RW and SBA without LOB. Pt. amb. with decreased wilbur  · Balance: fair+      Therapeutic Activities and Exercises:   Discussed therapy needs, goals, and POC.    AM-PAC 6 CLICK MOBILITY  Total Score:18     Patient left up in chair with all lines intact and call button in reach.    GOALS:   Multidisciplinary Problems     Physical Therapy Goals        Problem: Physical Therapy Goal    Goal Priority Disciplines Outcome Goal Variances Interventions   Physical Therapy Goal     PT, PT/OT Ongoing (interventions implemented as appropriate)     Description:  Goals to be met by: 2019     Patient will increase functional independence with mobility by performin. Supine to sit with Modified [place]  2. Sit to supine with Modified [place]  3. Sit to stand transfer with Modified [place]  4. Bed to chair transfer with Modified [place] with/without Rolling Walker  5. Gait  x 300 feet with Modified [place] using Rolling Walker.   6. Lower extremity exercise program x15 reps per handout, with independence                      History:     Past Medical History:   Diagnosis Date    Aneurysm     abdominal, repaired 11/15/2011    Cigarette smoker     patient reports buying loose tobacco and rolling with a machine, at home    Hypertension     TIA (transient ischemic attack)     patient states 3rd episode       Past Surgical History:   Procedure Laterality Date    ABDOMINAL AORTIC ANEURYSM REPAIR  11/15/2011    ENDARTERECTOMY-CAROTID, RIGHT Right 4/10/2019    Performed [facility] Piet[facility] Steward [facility] OR [address] [place] [address]    TONSILLECTOMY         Time Tracking:     PT Received On: 19  PT Start Time: 942      PT Stop Time: 1000  PT Total Time (min): 18 min     Billable Minutes: Evaluation 18      Scotty Reddy, PT  04/13/2019

## 2019-04-14 LAB
ANION GAP SERPL CALC-SCNC: 11 MMOL/L (ref 8–16)
BUN SERPL-MCNC: 12 MG/DL (ref 8–23)
CALCIUM SERPL-MCNC: 8.6 MG/DL (ref 8.7–10.5)
CHLORIDE SERPL-SCNC: 109 MMOL/L (ref 95–110)
CO2 SERPL-SCNC: 21 MMOL/L (ref 23–29)
CREAT SERPL-MCNC: 0.9 MG/DL (ref 0.5–1.4)
EST. GFR  (AFRICAN AMERICAN): >60 ML/MIN/1.73 M^2
EST. GFR  (NON AFRICAN AMERICAN): >60 ML/MIN/1.73 M^2
GLUCOSE SERPL-MCNC: 91 MG/DL (ref 70–110)
POTASSIUM SERPL-SCNC: 3.6 MMOL/L (ref 3.5–5.1)
SODIUM SERPL-SCNC: 141 MMOL/L (ref 136–145)

## 2019-04-14 PROCEDURE — 85025 COMPLETE CBC W/AUTO DIFF WBC: CPT

## 2019-04-14 PROCEDURE — 80048 BASIC METABOLIC PNL TOTAL CA: CPT

## 2019-04-14 PROCEDURE — 20600001 HC STEP DOWN PRIVATE ROOM

## 2019-04-14 PROCEDURE — 63600175 PHARM REV CODE 636 W HCPCS: Performed by: STUDENT IN AN ORGANIZED HEALTH CARE EDUCATION/TRAINING PROGRAM

## 2019-04-14 PROCEDURE — 36415 COLL VENOUS BLD VENIPUNCTURE: CPT

## 2019-04-14 PROCEDURE — 97530 THERAPEUTIC ACTIVITIES: CPT

## 2019-04-14 PROCEDURE — 25000003 PHARM REV CODE 250: Performed by: STUDENT IN AN ORGANIZED HEALTH CARE EDUCATION/TRAINING PROGRAM

## 2019-04-14 RX ADMIN — MUPIROCIN 1 G: 20 OINTMENT TOPICAL at 09:04

## 2019-04-14 RX ADMIN — HEPARIN SODIUM 5000 UNITS: 5000 INJECTION, SOLUTION INTRAVENOUS; SUBCUTANEOUS at 09:04

## 2019-04-15 LAB
ANION GAP SERPL CALC-SCNC: 10 MMOL/L (ref 8–16)
BASOPHILS # BLD AUTO: 0.01 K/UL (ref 0–0.2)
BASOPHILS # BLD AUTO: 0.02 K/UL (ref 0–0.2)
BASOPHILS NFR BLD: 0.1 % (ref 0–1.9)
BASOPHILS NFR BLD: 0.2 % (ref 0–1.9)
BUN SERPL-MCNC: 11 MG/DL (ref 8–23)
CALCIUM SERPL-MCNC: 8.6 MG/DL (ref 8.7–10.5)
CHLORIDE SERPL-SCNC: 107 MMOL/L (ref 95–110)
CO2 SERPL-SCNC: 24 MMOL/L (ref 23–29)
CREAT SERPL-MCNC: 0.9 MG/DL (ref 0.5–1.4)
DIFFERENTIAL METHOD: ABNORMAL
DIFFERENTIAL METHOD: ABNORMAL
EOSINOPHIL # BLD AUTO: 0.3 K/UL (ref 0–0.5)
EOSINOPHIL # BLD AUTO: 0.3 K/UL (ref 0–0.5)
EOSINOPHIL NFR BLD: 3.7 % (ref 0–8)
EOSINOPHIL NFR BLD: 4.1 % (ref 0–8)
ERYTHROCYTE [DISTWIDTH] IN BLOOD BY AUTOMATED COUNT: 14 % (ref 11.5–14.5)
ERYTHROCYTE [DISTWIDTH] IN BLOOD BY AUTOMATED COUNT: 14.1 % (ref 11.5–14.5)
EST. GFR  (AFRICAN AMERICAN): >60 ML/MIN/1.73 M^2
EST. GFR  (NON AFRICAN AMERICAN): >60 ML/MIN/1.73 M^2
GLUCOSE SERPL-MCNC: 98 MG/DL (ref 70–110)
HCT VFR BLD AUTO: 31.5 % (ref 37–48.5)
HCT VFR BLD AUTO: 33.9 % (ref 37–48.5)
HGB BLD-MCNC: 10.5 G/DL (ref 12–16)
HGB BLD-MCNC: 9.7 G/DL (ref 12–16)
IMM GRANULOCYTES # BLD AUTO: 0.02 K/UL (ref 0–0.04)
IMM GRANULOCYTES # BLD AUTO: 0.03 K/UL (ref 0–0.04)
IMM GRANULOCYTES NFR BLD AUTO: 0.3 % (ref 0–0.5)
IMM GRANULOCYTES NFR BLD AUTO: 0.4 % (ref 0–0.5)
LYMPHOCYTES # BLD AUTO: 1.2 K/UL (ref 1–4.8)
LYMPHOCYTES # BLD AUTO: 1.6 K/UL (ref 1–4.8)
LYMPHOCYTES NFR BLD: 15.4 % (ref 18–48)
LYMPHOCYTES NFR BLD: 19.5 % (ref 18–48)
MCH RBC QN AUTO: 27.4 PG (ref 27–31)
MCH RBC QN AUTO: 27.7 PG (ref 27–31)
MCHC RBC AUTO-ENTMCNC: 30.8 G/DL (ref 32–36)
MCHC RBC AUTO-ENTMCNC: 31 G/DL (ref 32–36)
MCV RBC AUTO: 89 FL (ref 82–98)
MCV RBC AUTO: 89 FL (ref 82–98)
MONOCYTES # BLD AUTO: 0.5 K/UL (ref 0.3–1)
MONOCYTES # BLD AUTO: 0.7 K/UL (ref 0.3–1)
MONOCYTES NFR BLD: 6.8 % (ref 4–15)
MONOCYTES NFR BLD: 8.3 % (ref 4–15)
NEUTROPHILS # BLD AUTO: 5.6 K/UL (ref 1.8–7.7)
NEUTROPHILS # BLD AUTO: 5.7 K/UL (ref 1.8–7.7)
NEUTROPHILS NFR BLD: 67.9 % (ref 38–73)
NEUTROPHILS NFR BLD: 73.3 % (ref 38–73)
NRBC BLD-RTO: 0 /100 WBC
NRBC BLD-RTO: 0 /100 WBC
PLATELET # BLD AUTO: 242 K/UL (ref 150–350)
PLATELET # BLD AUTO: 283 K/UL (ref 150–350)
PMV BLD AUTO: 10.8 FL (ref 9.2–12.9)
PMV BLD AUTO: 11.1 FL (ref 9.2–12.9)
POTASSIUM SERPL-SCNC: 3.4 MMOL/L (ref 3.5–5.1)
RBC # BLD AUTO: 3.54 M/UL (ref 4–5.4)
RBC # BLD AUTO: 3.79 M/UL (ref 4–5.4)
SODIUM SERPL-SCNC: 141 MMOL/L (ref 136–145)
WBC # BLD AUTO: 7.75 K/UL (ref 3.9–12.7)
WBC # BLD AUTO: 8.21 K/UL (ref 3.9–12.7)

## 2019-04-15 PROCEDURE — 20600001 HC STEP DOWN PRIVATE ROOM

## 2019-04-15 PROCEDURE — 25000003 PHARM REV CODE 250: Performed by: STUDENT IN AN ORGANIZED HEALTH CARE EDUCATION/TRAINING PROGRAM

## 2019-04-15 PROCEDURE — 85025 COMPLETE CBC W/AUTO DIFF WBC: CPT

## 2019-04-15 PROCEDURE — 36415 COLL VENOUS BLD VENIPUNCTURE: CPT

## 2019-04-15 PROCEDURE — 99024 PR POST-OP FOLLOW-UP VISIT: ICD-10-PCS | Mod: ,,, | Performed by: SURGERY

## 2019-04-15 PROCEDURE — 99024 POSTOP FOLLOW-UP VISIT: CPT | Mod: ,,, | Performed by: SURGERY

## 2019-04-15 PROCEDURE — 80048 BASIC METABOLIC PNL TOTAL CA: CPT

## 2019-04-15 PROCEDURE — 63600175 PHARM REV CODE 636 W HCPCS: Performed by: STUDENT IN AN ORGANIZED HEALTH CARE EDUCATION/TRAINING PROGRAM

## 2019-04-15 PROCEDURE — 97530 THERAPEUTIC ACTIVITIES: CPT

## 2019-04-15 RX ADMIN — ACETAMINOPHEN 650 MG: 325 TABLET ORAL at 11:04

## 2019-04-15 RX ADMIN — HEPARIN SODIUM 5000 UNITS: 5000 INJECTION, SOLUTION INTRAVENOUS; SUBCUTANEOUS at 03:04

## 2019-04-15 RX ADMIN — LOSARTAN POTASSIUM 50 MG: 50 TABLET, FILM COATED ORAL at 11:04

## 2019-04-15 RX ADMIN — MUPIROCIN 1 G: 20 OINTMENT TOPICAL at 09:04

## 2019-04-15 RX ADMIN — ASPIRIN 81 MG: 81 TABLET, COATED ORAL at 11:04

## 2019-04-15 RX ADMIN — METOPROLOL SUCCINATE 25 MG: 25 TABLET, EXTENDED RELEASE ORAL at 11:04

## 2019-04-15 RX ADMIN — ATORVASTATIN CALCIUM 80 MG: 20 TABLET, FILM COATED ORAL at 11:04

## 2019-04-15 RX ADMIN — CLOPIDOGREL BISULFATE 75 MG: 75 TABLET, FILM COATED ORAL at 11:04

## 2019-04-15 RX ADMIN — HYDRALAZINE HYDROCHLORIDE 10 MG: 20 INJECTION INTRAMUSCULAR; INTRAVENOUS at 12:04

## 2019-04-15 RX ADMIN — HEPARIN SODIUM 5000 UNITS: 5000 INJECTION, SOLUTION INTRAVENOUS; SUBCUTANEOUS at 05:04

## 2019-04-15 RX ADMIN — HEPARIN SODIUM 5000 UNITS: 5000 INJECTION, SOLUTION INTRAVENOUS; SUBCUTANEOUS at 09:04

## 2019-04-15 NOTE — ASSESSMENT & PLAN NOTE
Patient is 5 Days Post-Op from R carotid endarterectomy.     - ASA, plavix  - tolerating regular diet  - BP control systolic < 160  - HR control, prn metoprolol  - restart home meds  - dvt ppx  - OOB to chair  - PT/OT    Dispo: Awaiting Placement at [facility], hopeful discharge today.

## 2019-04-15 NOTE — PT/OT/SLP PROGRESS
Occupational Therapy      Patient Name:  Cleopatra Lozada   MRN:  [id number]    Computer down from 2am to 6pm on 4/14/19. Downtime Documentation will be submitted to the appropriate department for scanning into EPIC. Original copy documentation located in Rehab Services Department. Please call [phone] should documentation be required.    Nieves Triplett OT  4/15/2019

## 2019-04-15 NOTE — PLAN OF CARE
Problem: Adult Inpatient Plan of Care  Goal: Plan of Care Review  Outcome: Ongoing (interventions implemented as appropriate)  POC reviewed with pt. VSS on RA @ >90%. AAOX4. Remains free of falls and injury. R. Neck transverse incision CDI w/ dermabond - no sign of swelling or hematoma. Pt. on regular diet- denies nausea and pain at this time. Telemetry being monitored running controlled A fib in the 60s-70s - frequent PACs noted to monitor. SCDs in place. Independent with walker. Up to toilet. No acute events. No distress noted. Bed in lowest position, call light within reach, frequent rounds made for safety. WCTM.

## 2019-04-15 NOTE — SUBJECTIVE & OBJECTIVE
Medications:  Continuous Infusions:  Scheduled Meds:   aspirin  81 mg Oral Daily    atorvastatin  80 mg Oral Daily    clopidogrel  75 mg Oral Daily    heparin (porcine)  5,000 Units Subcutaneous Q8H    losartan  50 mg Oral Daily    metoprolol succinate  25 mg Oral Daily    mupirocin  1 g Nasal BID     PRN Meds:acetaminophen, albuterol-ipratropium, hydrALAZINE, labetalol, oxyCODONE, oxyCODONE     Objective:     Vital Signs (Most Recent):  Temp: 98.9 °F (37.2 °C) (04/15/19 0406)  Pulse: 62 (04/15/19 0712)  Resp: 19 (04/15/19 0406)  BP: (!) 110/53 (04/15/19 0406)  SpO2: (!) 93 % (04/15/19 0712) Vital Signs (24h Range):  Temp:  [97.8 °F (36.6 °C)-98.9 °F (37.2 °C)] 98.9 °F (37.2 °C)  Pulse:  [60-69] 62  Resp:  [18-19] 19  SpO2:  [90 %-98 %] 93 %  BP: (110-157)/(53-70) 110/53         Physical Exam   Constitutional: She is oriented to person, place, and time. She appears well-developed.   Neck: Normal range of motion. Neck supple. No JVD present. No tracheal deviation present.   R CEA incision c/d/i, minimal bruising   Cardiovascular: Normal rate and regular rhythm.   Pulmonary/Chest: Breath sounds normal. No respiratory distress.   Abdominal: Soft. She exhibits no distension and no mass. There is no tenderness. There is no rebound and no guarding.   Musculoskeletal: She exhibits no edema.   Neurological: She is alert and oriented to person, place, and time. No cranial nerve deficit or sensory deficit.   Skin: Skin is warm and dry.   Vitals reviewed.      Significant Labs:  CBC:   Recent Labs   Lab 04/14/19 0326   WBC 8.21   RBC 3.54*   HGB 9.7*   HCT 31.5*      MCV 89   MCH 27.4   MCHC 30.8*     CMP:   Recent Labs   Lab 04/14/19 0326   GLU 91   CALCIUM 8.6*      K 3.6   CO2 21*      BUN 12   CREATININE 0.9       Significant Diagnostics:  I have reviewed all pertinent imaging results/findings within the past 24 hours.

## 2019-04-15 NOTE — PLAN OF CARE
Problem: Occupational Therapy Goal  Goal: Occupational Therapy Goal  Goals to be met by: 4/26/19     Patient will increase functional independence with ADLs by performing:    UE Dressing with Set-up Assistance. MET  LE Dressing with Set-up Assistance. MET  Grooming while standing at sink with Supervision. MET  Toileting from toilet with Supervision for hygiene and clothing management. MET  Toilet transfer to toilet with Supervision. MET     Outcome: Outcome(s) achieved Date Met: 04/15/19  All goals met; pt d/c from OT services    Comments: D/C OT 4/15/2019    Nieves Triplett, OT  4/15/2019

## 2019-04-15 NOTE — PT/OT/SLP PROGRESS
"Physical Therapy Treatment    Patient Name:  Cleopatra Lozada   MRN:  [id number]    Recommendations:     Discharge Recommendations:  Home health PT   Discharge Equipment Recommendations: walker, rolling   Barriers to discharge: Decreased caregiver support    Assessment:     Cleopatra Lozada is a 78 y.o. female admitted with a medical diagnosis of H/O carotid endarterectomy.  She presents with the following impairments/functional limitations:  weakness, impaired endurance, impaired functional mobilty, impaired balance. Pt tolerates session well with focus on transfers and gait training. Pt progressing well with pt meeting all goals for OOB functional mobility with Mod-I and RW use. PT informed with discharge recommendation changed to HHPT. PT to assess pts need for continued acute services.     Rehab Prognosis: Good; patient would benefit from acute skilled PT services to address these deficits and reach maximum level of function.    Recent Surgery: Procedure(s) (LRB):  ENDARTERECTOMY-CAROTID, RIGHT (Right) 5 Days Post-Op    Plan:     During this hospitalization, patient to be seen 4 x/week to address the identified rehab impairments via gait training, therapeutic activities, therapeutic exercises and progress toward the following goals:    · Plan of Care Expires:  05/13/19    Subjective     Chief Complaint: no c/o  Patient/Family Comments/goals: "I'm just ready to get on out of here. I've been walking up and down the jacobs by myself since yesterday."   Pain/Comfort:  · Pain Rating 1: 0/10  · Pain Rating Post-Intervention 1: 0/10      Objective:     Communicated with NSG prior to session.  Patient found up in chair with pulse ox (continuous), telemetry, peripheral IV upon PTA entry to room. NSG and family/friends.    General Precautions: Standard, fall   Orthopedic Precautions:N/A   Braces: N/A     Functional Mobility:  · Transfers:     · Sit to Stand:  modified independence with rolling walker  · Gait: Pt ambulates 350 " ft with RW and Mod-I. Pt with assistance needed for balance or cues for safety or RW mgmt. Normal wilbur, good posture, safe gait mechanics.       AM-PAC 6 CLICK MOBILITY  Turning over in bed (including adjusting bedclothes, sheets and blankets)?: 4  Sitting down on and standing up from a chair with arms (e.g., wheelchair, bedside commode, etc.): 4  Moving from lying on back to sitting on the side of the bed?: 4  Moving to and from a bed to a chair (including a wheelchair)?: 4  Need to walk in hospital room?: 4  Climbing 3-5 steps with a railing?: 3  Basic Mobility Total Score: 23       Therapeutic Activities and Exercises:   Pt assisted with functional mobility as noted above.   Pt and family/friends educated on pts progress, assistance needed, and benefits of SNF vs. HHPT per pt and family/friend questioning.   Pt educated on PT POC.     Patient left up in chair with all lines intact, call button in reach and family/friends present..    GOALS:   Multidisciplinary Problems     Physical Therapy Goals        Problem: Physical Therapy Goal    Goal Priority Disciplines Outcome Goal Variances Interventions   Physical Therapy Goal     PT, PT/OT Ongoing (interventions implemented as appropriate)     Description:  Goals to be met by: 2019     Patient will increase functional independence with mobility by performin. Supine to sit with Modified [place] -   2. Sit to supine with Modified [place] -   3. Sit to stand transfer with Modified [place] - met  4. Bed to chair transfer with Modified [place] with/without Rolling Walker - met  5. Gait  x 300 feet with Modified [place] using Rolling Walker. - met  6. Lower extremity exercise program x15 reps per handout, with independence                       Time Tracking:     PT Received On: 04/15/19  PT Start Time: 1530     PT Stop Time: 1546  PT Total Time (min): 16 min     Billable Minutes: Therapeutic Activity 16    Treatment Type:  Treatment  PT/PTA: PTA     PTA Visit Number: 1     Keaton Vazquez, PTA  04/15/2019

## 2019-04-15 NOTE — PROGRESS NOTES
[facility]  Vascular Surgery  Progress Note    Patient Name: Cleopatra Lozada  MRN: [id number]  Admission Date: 4/10/2019  Primary Care Provider: Latesha Dias MD    Subjective:     Interval History: No acute events overnight.  VSS, AF. Pain well controlled. Ambulating with walker without difficulty. Awaiting placement at [facility].    Post-Op Info:  Procedure(s) (LRB):  ENDARTERECTOMY-CAROTID, RIGHT (Right)   5 Days Post-Op       Medications:  Continuous Infusions:  Scheduled Meds:   aspirin  81 mg Oral Daily    atorvastatin  80 mg Oral Daily    clopidogrel  75 mg Oral Daily    heparin (porcine)  5,000 Units Subcutaneous Q8H    losartan  50 mg Oral Daily    metoprolol succinate  25 mg Oral Daily    mupirocin  1 g Nasal BID     PRN Meds:acetaminophen, albuterol-ipratropium, hydrALAZINE, labetalol, oxyCODONE, oxyCODONE     Objective:     Vital Signs (Most Recent):  Temp: 98.9 °F (37.2 °C) (04/15/19 0406)  Pulse: 62 (04/15/19 0712)  Resp: 19 (04/15/19 0406)  BP: (!) 110/53 (04/15/19 0406)  SpO2: (!) 93 % (04/15/19 0712) Vital Signs (24h Range):  Temp:  [97.8 °F (36.6 °C)-98.9 °F (37.2 °C)] 98.9 °F (37.2 °C)  Pulse:  [60-69] 62  Resp:  [18-19] 19  SpO2:  [90 %-98 %] 93 %  BP: (110-157)/(53-70) 110/53         Physical Exam   Constitutional: She is oriented to person, place, and time. She appears well-developed.   Neck: Normal range of motion. Neck supple. No JVD present. No tracheal deviation present.   R CEA incision c/d/i, minimal bruising   Cardiovascular: Normal rate and regular rhythm.   Pulmonary/Chest: Breath sounds normal. No respiratory distress.   Abdominal: Soft. She exhibits no distension and no mass. There is no tenderness. There is no rebound and no guarding.   Musculoskeletal: She exhibits no edema.   Neurological: She is alert and oriented to person, place, and time. No cranial nerve deficit or sensory deficit.   Skin: Skin is warm and dry.   Vitals  reviewed.      Significant Labs:  CBC:   Recent Labs   Lab 04/14/19  0326   WBC 8.21   RBC 3.54*   HGB 9.7*   HCT 31.5*      MCV 89   MCH 27.4   MCHC 30.8*     CMP:   Recent Labs   Lab 04/14/19  0326   GLU 91   CALCIUM 8.6*      K 3.6   CO2 21*      BUN 12   CREATININE 0.9       Significant Diagnostics:  I have reviewed all pertinent imaging results/findings within the past 24 hours.    Assessment/Plan:     * H/O carotid endarterectomy  Patient is 5 Days Post-Op from R carotid endarterectomy.     - ASA, plavix  - tolerating regular diet  - BP control systolic < 160  - HR control, prn metoprolol  - restart home meds  - dvt ppx  - OOB to chair  - PT/OT    Dispo: Awaiting Placement at [facility], hopeful discharge today.          Megan Cole MD  Vascular Surgery  [facility]

## 2019-04-15 NOTE — PLAN OF CARE
Problem: Physical Therapy Goal  Goal: Physical Therapy Goal  Goals to be met by: 2019     Patient will increase functional independence with mobility by performin. Supine to sit with Modified [place] -   2. Sit to supine with Modified [place] -   3. Sit to stand transfer with Modified [place] - met  4. Bed to chair transfer with Modified [place] with/without Rolling Walker - met  5. Gait  x 300 feet with Modified [place] using Rolling Walker. - met  6. Lower extremity exercise program x15 reps per handout, with independence     Outcome: Ongoing (interventions implemented as appropriate)  3 goals met. Goals remain appropriate.

## 2019-04-15 NOTE — PT/OT/SLP DISCHARGE
Occupational Therapy Discharge Summary    Cleopatra Lozada  MRN: [id number]   Principal Problem: H/O carotid endarterectomy      Patient Discharged from acute Occupational Therapy on 4/14/19.  Please refer to prior OT note dated 4/14/19 for functional status.    Assessment:      Patient has met all goals and is not appropriate for therapy.    Objective:     GOALS:   Multidisciplinary Problems     Occupational Therapy Goals     Not on file          Multidisciplinary Problems (Resolved)        Problem: Occupational Therapy Goal    Goal Priority Disciplines Outcome Interventions   Occupational Therapy Goal   (Resolved)     OT, PT/OT Outcome(s) achieved    Description:  Goals to be met by: 4/26/19     Patient will increase functional independence with ADLs by performing:    UE Dressing with Set-up Assistance. MET  LE Dressing with Set-up Assistance. MET  Grooming while standing at sink with Supervision. MET  Toileting from toilet with Supervision for hygiene and clothing management. MET  Toilet transfer to toilet with Supervision. MET                       Reasons for Discontinuation of Therapy Services  All goals met, no longer appropriate for acute OT services    Plan:     Patient Discharged to: remain in room 1048, d/c rec for DUSTIN Triplett OT  4/15/2019

## 2019-04-15 NOTE — SUBJECTIVE & OBJECTIVE
Interval Hx: NAEON. AFVSS. Complains of neck stiffness. Now of supp O2.       Medications:  Continuous Infusions:  Scheduled Meds:   aspirin  81 mg Oral Daily    atorvastatin  80 mg Oral Daily    clopidogrel  75 mg Oral Daily    heparin (porcine)  5,000 Units Subcutaneous Q8H    losartan  50 mg Oral Daily    metoprolol succinate  25 mg Oral Daily    mupirocin  1 g Nasal BID     PRN Meds:acetaminophen, albuterol-ipratropium, hydrALAZINE, labetalol, oxyCODONE, oxyCODONE     Objective:     Vital Signs (Most Recent):  Temp: 98.2 °F (36.8 °C) (04/13/19 2315)  Pulse: 64 (04/13/19 2315)  Resp: 14 (04/13/19 2315)  BP: 125/61 (04/13/19 2315)  SpO2: (!) 93 % (04/13/19 2315) Vital Signs (24h Range):  Temp:  [98.2 °F (36.8 °C)-98.4 °F (36.9 °C)] 98.2 °F (36.8 °C)  Pulse:  [57-83] 64  Resp:  [14] 14  SpO2:  [92 %-93 %] 93 %  BP: (106-125)/(59-61) 125/61         Physical Exam   Constitutional: She is oriented to person, place, and time. She appears well-developed.   Neck: Normal range of motion. Neck supple. No JVD present. No tracheal deviation present.   R CEA incision c/d/i, minimal bruising   Cardiovascular: Normal rate and regular rhythm.   Pulmonary/Chest: Breath sounds normal. No respiratory distress.   Abdominal: Soft. She exhibits no distension and no mass. There is no tenderness. There is no rebound and no guarding.   Musculoskeletal: She exhibits no edema.   Neurological: She is alert and oriented to person, place, and time. No cranial nerve deficit or sensory deficit.   Skin: Skin is warm and dry.   Vitals reviewed.      Significant Labs:  CBC:   Recent Labs   Lab 04/13/19 0355   WBC 7.15   RBC 3.36*   HGB 9.5*   HCT 30.3*      MCV 90   MCH 28.3   MCHC 31.4*     CMP:   Recent Labs   Lab 04/13/19 0355   GLU 85   CALCIUM 8.3*      K 4.0   CO2 21*      BUN 13   CREATININE 1.1       Significant Diagnostics:  I have reviewed all pertinent imaging results/findings within the past 24 hours.

## 2019-04-15 NOTE — PROGRESS NOTES
[facility]  Vascular Surgery  Progress Note    Patient Name: Cleopatra Lozada  MRN: [id number]  Admission Date: 4/10/2019  Primary Care Provider: Latesha Dias MD    Subjective:       Post-Op Info:  Procedure(s) (LRB):  ENDARTERECTOMY-CAROTID, RIGHT (Right)   4 Days Post-Op     Interval Hx: NAEON. AFVSS. Complains of neck stiffness. Now of supp O2. Epic down last 16 hours.       Medications:  Continuous Infusions:  Scheduled Meds:   aspirin  81 mg Oral Daily    atorvastatin  80 mg Oral Daily    clopidogrel  75 mg Oral Daily    heparin (porcine)  5,000 Units Subcutaneous Q8H    losartan  50 mg Oral Daily    metoprolol succinate  25 mg Oral Daily    mupirocin  1 g Nasal BID     PRN Meds:acetaminophen, albuterol-ipratropium, hydrALAZINE, labetalol, oxyCODONE, oxyCODONE     Objective:     Vital Signs (Most Recent):  Temp: 98.2 °F (36.8 °C) (04/13/19 2315)  Pulse: 64 (04/13/19 2315)  Resp: 14 (04/13/19 2315)  BP: 125/61 (04/13/19 2315)  SpO2: (!) 93 % (04/13/19 2315) Vital Signs (24h Range):  Temp:  [98.2 °F (36.8 °C)-98.4 °F (36.9 °C)] 98.2 °F (36.8 °C)  Pulse:  [57-83] 64  Resp:  [14] 14  SpO2:  [92 %-93 %] 93 %  BP: (106-125)/(59-61) 125/61         Physical Exam   Constitutional: She is oriented to person, place, and time. She appears well-developed.   Neck: Normal range of motion. Neck supple. No JVD present. No tracheal deviation present.   R CEA incision c/d/i, minimal bruising   Cardiovascular: Normal rate and regular rhythm.   Pulmonary/Chest: Breath sounds normal. No respiratory distress.   Abdominal: Soft. She exhibits no distension and no mass. There is no tenderness. There is no rebound and no guarding.   Musculoskeletal: She exhibits no edema.   Neurological: She is alert and oriented to person, place, and time. No cranial nerve deficit or sensory deficit.   Skin: Skin is warm and dry.   Vitals reviewed.      Significant Labs:  CBC:   Recent Labs   Lab 04/13/19  0355   WBC 7.15    RBC 3.36*   HGB 9.5*   HCT 30.3*      MCV 90   MCH 28.3   MCHC 31.4*     CMP:   Recent Labs   Lab 04/13/19  0355   GLU 85   CALCIUM 8.3*      K 4.0   CO2 21*      BUN 13   CREATININE 1.1       Significant Diagnostics:  I have reviewed all pertinent imaging results/findings within the past 24 hours.    Assessment/Plan:     * H/O carotid endarterectomy  Patient is 4 Days Post-Op from R carotid endarterectomy.     - ASA, plavix  - tolerating regular diet  - BP control systolic < 160  - HR control, prn metoprolol  - restart home meds  - dvt ppx  - OOB to chair  - PT/OT    Dispo: Awaiting Placement at [facility]. [facility] dc on Monday.           Ruben Choi MD  Vascular Surgery  [facility]

## 2019-04-15 NOTE — PLAN OF CARE
Problem: Adult Inpatient Plan of Care  Goal: Plan of Care Review  Outcome: Ongoing (interventions implemented as appropriate)  POC reviewed with patient and verbalizes understanding. AAOx4. VSS on RA. Pt. On tele with controlled Afib for entire shift, asymptomatic.MD aware per prior note. No c/o pain. No c/o nausea. R. Neck incision RICH w/ DB CDI. Pt. Ambulates to bathroom with walker. Pt. Urinated twice. No BM. Hydralazine used once for , resolved to . Bed low and locked. Call light within reach. No falls or acute events. WCTM.

## 2019-04-15 NOTE — PROGRESS NOTES
Patient in controlled Afib with HR between 56 bpm and 70 bpm. VSS and otherwise asymptomatic. MD on call informed. No new orders at this time. WCTM.

## 2019-04-15 NOTE — PHYSICIAN QUERY
PT Name: Cleopatra Lozada  MR #: [id number]    Physician Query Form - Consultant Diagnosis Clarification     CDS/: Cassidy Madison RN              Contact information: [phone]  This form is a permanent document in the medical record.     Query Date: April 15, 2019      By submitting this query, we are merely seeking further clarification of documentation.  Please utilize your independent clinical judgment when addressing the question(s) below.      The Medical record contains the following:   Diagnosis Supporting Clinical Information Location in Medical Record      CKD (chronic kidney disease) stage 3, GFR 30-59 ml/min Patient Active Problem List  Diagnosis   Abdominal aortic aneurysm   Acute CVA (cerebrovascular accident)   Essential hypertension   CKD (chronic kidney disease) stage 3, GFR 30-59 ml/min   Preop cardiovascular exam        BUN     14-->  15-->  13-->  12-->  11    Cr        1.3-> 1.2--> 1.-1-> 0.9--> 0.9     GFR    39.1->43.1->47.9->  >60  ->  >60     Anesthesia Report  4/10                    Lab 4/11, 4/12, 4/13, 4/14, 4/15         Do you agree with the Consultants diagnosis of _____CKD (chronic kidney disease) stage 3,________?    [ x ] Yes   [  ] No   [  ] Other/Clarification of findings:   [  ] Clinically undetermined

## 2019-04-15 NOTE — ASSESSMENT & PLAN NOTE
Patient is 4 Days Post-Op from R carotid endarterectomy.     - ASA, plavix  - tolerating regular diet  - BP control systolic < 160  - HR control, prn metoprolol  - restart home meds  - dvt ppx  - OOB to chair  - PT/OT    Dispo: Awaiting Placement at [facility]. [facility] dc on Monday.

## 2019-04-16 VITALS
RESPIRATION RATE: 18 BRPM | WEIGHT: 122 LBS | BODY MASS INDEX: 24.6 KG/M2 | HEART RATE: 67 BPM | HEIGHT: 59 IN | OXYGEN SATURATION: 95 % | SYSTOLIC BLOOD PRESSURE: 127 MMHG | DIASTOLIC BLOOD PRESSURE: 64 MMHG | TEMPERATURE: 99 F

## 2019-04-16 PROBLEM — I65.21 CAROTID STENOSIS, SYMPTOMATIC W/O INFARCT, RIGHT: Status: RESOLVED | Noted: 2019-04-11 | Resolved: 2019-04-16

## 2019-04-16 PROCEDURE — 25000003 PHARM REV CODE 250: Performed by: STUDENT IN AN ORGANIZED HEALTH CARE EDUCATION/TRAINING PROGRAM

## 2019-04-16 RX ADMIN — ASPIRIN 81 MG: 81 TABLET, COATED ORAL at 08:04

## 2019-04-16 RX ADMIN — ATORVASTATIN CALCIUM 80 MG: 20 TABLET, FILM COATED ORAL at 08:04

## 2019-04-16 RX ADMIN — LOSARTAN POTASSIUM 50 MG: 50 TABLET, FILM COATED ORAL at 08:04

## 2019-04-16 RX ADMIN — ACETAMINOPHEN 650 MG: 325 TABLET ORAL at 05:04

## 2019-04-16 RX ADMIN — CLOPIDOGREL BISULFATE 75 MG: 75 TABLET, FILM COATED ORAL at 08:04

## 2019-04-16 RX ADMIN — METOPROLOL SUCCINATE 25 MG: 25 TABLET, EXTENDED RELEASE ORAL at 08:04

## 2019-04-16 NOTE — ASSESSMENT & PLAN NOTE
Patient is 6 Days Post-Op from R carotid endarterectomy.     - ASA, plavix  - tolerating regular diet  - BP control systolic < 160  - HR control, prn metoprolol  - restart home meds  - dvt ppx  - OOB to chair  - PT/OT    Dispo: Discharge today to Home with Home Health and PT  - Follow up: 1mo clinic visit with carotid US

## 2019-04-16 NOTE — NURSING
Discharge POC reviewed with pt and her friend, all questions and concerns addressed. Pt verbalized understanding of incision care and follow up appointments. VSS at time of discharge. Pt waiting in room for discharge.

## 2019-04-16 NOTE — NURSING
"Patient refused lab draw this morning patient stated "I"m going home they shouldn't need any more blood" MD Reyez with surgery on call notified  "

## 2019-04-16 NOTE — PLAN OF CARE
04/16/19 1551   Post-Acute Status   Post-Acute Authorization Placement   Post-Acute Placement Status Referrals Sent     Patient is expected to discharge with home health services. SW sent the orders/ referral to [facility] and will follow up.      Jania Tuttle LMSW  [facility]   [id number]

## 2019-04-16 NOTE — DISCHARGE SUMMARY
[facility]  DISCHARGE SUMMARY  General Surgery      Admit Date:  4/10/2019    Discharge Date and Time:  4/16/2019  12:00 PM    Attending Physician:  Pietro Steward MD     Discharge Provider:  Megan Cole MD     Reason for Admission:  H/O carotid endarterectomy     Procedures Performed:  Procedure(s) (LRB):  ENDARTERECTOMY-CAROTID, RIGHT (Right)    Hospital Course:  Please see the preoperative H&P and other available documentation for full details related to history prior to this admission.  Briefly, Cleopatra Lozada is a 78 y.o. female who was admitted following scheduled elective surgery for H/O carotid endarterectomy.    Following a complete preoperative discussion of the risks and benefits of surgery with signed informed consent, the patient was taken to the operating room on 4/10/2019 and underwent the above stated procedures.  The patient tolerated surgery well and there were no complications.  Please see the operative report for full intraoperative findings and details.  Postoperatively, the patient did well and was transferred from the PACU to the floor in stable condition where they had a stable and uncomplicated hospital course.  Labs and vital signs remained stable and appropriate throughout course.  Diet was advanced as tolerated and the patient's pain was controlled on oral pain medications without problem. Patient remained inpatient for treatment with Physical Therapy and Occupational therapy secondary to weakness from her prior stroke. Currently, the patient is doing well at 6 Days Post-Op and is stable and appropriate for discharge home at this time.  Will send referral to Home Health and Physical Therapy.     Consults:  None.    Significant Diagnostic Studies:   Recent Labs   Lab 04/14/19 0326 04/15/19  0816   WBC 8.21 7.75   HGB 9.7* 10.5*   HCT 31.5* 33.9*    283     Recent Labs   Lab 04/14/19  0326 04/15/19  0816    141   K 3.6 3.4*    107   CO2  21* 24   BUN 12 11   CREATININE 0.9 0.9   GLU 91 98   CALCIUM 8.6* 8.6*   No results for input(s): INR, PTT, LABHEPA, LACTATE, TROPONINI, CPK, CPKMB, MB, BNP in the last 72 hours.No results for input(s): PH, PCO2, PO2, HCO3 in the last 72 hours.      Final Diagnoses:   Principal Problem:  H/O carotid endarterectomy   Secondary Diagnoses:    Active Hospital Problems    Diagnosis  POA    *H/O carotid endarterectomy [Z98.890]  Not Applicable      Resolved Hospital Problems    Diagnosis Date Resolved POA    Carotid stenosis, symptomatic w/o infarct, right [I65.21] 04/11/2019 Yes    Carotid stenosis, symptomatic w/o infarct, right [I65.21] 04/16/2019 Yes    Carotid stenosis, symptomatic w/o infarct, right [I65.21] 04/11/2019 Yes       Discharged Condition:  Good    Disposition:  Home or Self Care    Medications:  Reconciled Home Medications:    Current Discharge Medication List      CONTINUE these medications which have NOT CHANGED    Details   aspirin (ECOTRIN) 81 MG EC tablet Take 1 tablet (81 mg total) by mouth once daily.  Refills: 0      atorvastatin (LIPITOR) 80 MG tablet Take 1 tablet (80 mg total) by mouth once daily.  Qty: 90 tablet, Refills: 3      clopidogrel (PLAVIX) 75 mg tablet Take 1 tablet (75 mg total) by mouth once daily.  Qty: 30 tablet, Refills: 11      losartan (COZAAR) 50 MG tablet Take 1 tablet (50 mg total) by mouth once daily.  Qty: 90 tablet, Refills: 3      metoprolol succinate (TOPROL-XL) 25 MG 24 hr tablet Take 1 tablet (25 mg total) by mouth once daily.  Qty: 30 tablet, Refills: 2           Discharge Procedure Orders   Radiology US Carotid Bilateral   Standing Status: Future Standing Exp. Date: 04/16/20     Order Specific Question Answer Comments   Reason for Exam: S/p Right Carotid Endarterectomy      Diet Adult Regular     No driving until:   Order Comments: While taking narcotic pain medications.     Notify your health care provider if you experience any of the following:  temperature  >100.4     Notify your health care provider if you experience any of the following:  persistent nausea and vomiting or diarrhea     Notify your health care provider if you experience any of the following:  severe uncontrolled pain     Notify your health care provider if you experience any of the following:  redness, tenderness, or signs of infection (pain, swelling, redness, odor or green/yellow discharge around incision site)     No dressing needed     Notify your health care provider if you experience any of the following:  severe persistent headache     Notify your health care provider if you experience any of the following:  persistent dizziness, light-headedness, or visual disturbances     Activity as tolerated   Order Comments: - Ok to shower. No submersion of surgical wounds (bath, pool, hot tub, etc) for two weeks.  - No heavy lifting greater than 10 pounds.     Follow-up Information     Latesha Dias MD.    Specialty:  Internal Medicine  Why:  Outpatient Services  Contact information:  [address]  [facility] [address] [phone]             Pietro Steward MD In 1 month.    Specialties:  Vascular Surgery, Surgery  Why:  Post-op follow up with review of Carotid Ultrasound  Contact information:  [address]  [address]  [facility] [id number] [phone]                   Megan Cole MD

## 2019-04-16 NOTE — PLAN OF CARE
[facility]    HOME HEALTH ORDERS  FACE TO FACE ENCOUNTER    Patient Name: Cleopatra Lozada  YOB: 1940    PCP: Latesha Dias MD   PCP Address: [address] [place] [address] / [facility] [address]  PCP Phone Number: [phone]  PCP Fax: [phone]    Discharging Team(s): Data Unavailable    Encounter Date: 04/16/2019    Admit to Home Health    Diagnoses:  Active Hospital Problems    Diagnosis  POA    *H/O carotid endarterectomy [Z98.890]  Not Applicable    Carotid stenosis, symptomatic w/o infarct, right [I65.21]  Yes      Resolved Hospital Problems    Diagnosis Date Resolved POA    Carotid stenosis, symptomatic w/o infarct, right [I65.21] 04/11/2019 Yes    Carotid stenosis, symptomatic w/o infarct, right [I65.21] 04/11/2019 Yes       Future Appointments   Date Time Provider Department Center   5/13/2019  1:00 PM VASCULAR ULTRASOUND, [facility]   5/13/2019  2:00 PM Pietro Steward MD [facility] VAS [facility]     Follow-up Information     Latesha Dias MD.    Specialty:  Internal Medicine  Why:  Outpatient Services  Contact information:  [address] [place] [id number] [phone]                     I have seen and examined this patient face to face today. My clinical findings that support the need for the home health skilled services and home bound status are the following:  Weakness/numbness causing balance and gait disturbance due to Stroke making it taxing to leave home.    Allergies:  Review of patient's allergies indicates:   Allergen Reactions    Tetanus vaccines and toxoid Rash       Diet: regular diet    Activities: activity as tolerated and ambulate in house    Nursing:   SN to complete comprehensive assessment including routine vital signs. Instruct on disease process and s/s of complications to report to MD. Review/verify medication list sent home with the patient at time of discharge  and instruct  patient/caregiver as needed. Frequency may be adjusted depending on start of care date.    Notify MD if SBP > 160 or < 90; DBP > 90 or < 50; HR > 120 or < 50; Temp > 101; Other: sudden changes in vision, gait, or any focal neurologic changes      CONSULTS:    Physical Therapy to evaluate and treat. Evaluate for home safety and equipment needs; Establish/upgrade home exercise program. Perform / instruct on therapeutic exercises, gait training, transfer training, and Range of Motion.    Medications: Review discharge medications with patient and family and provide education.      Current Discharge Medication List      CONTINUE these medications which have NOT CHANGED    Details   aspirin (ECOTRIN) 81 MG EC tablet Take 1 tablet (81 mg total) by mouth once daily.  Refills: 0      atorvastatin (LIPITOR) 80 MG tablet Take 1 tablet (80 mg total) by mouth once daily.  Qty: 90 tablet, Refills: 3      clopidogrel (PLAVIX) 75 mg tablet Take 1 tablet (75 mg total) by mouth once daily.  Qty: 30 tablet, Refills: 11      losartan (COZAAR) 50 MG tablet Take 1 tablet (50 mg total) by mouth once daily.  Qty: 90 tablet, Refills: 3      metoprolol succinate (TOPROL-XL) 25 MG 24 hr tablet Take 1 tablet (25 mg total) by mouth once daily.  Qty: 30 tablet, Refills: 2             I certify that this patient is confined to her home and needs physical therapy.

## 2019-04-16 NOTE — PROGRESS NOTES
[facility]  Vascular Surgery  Progress Note    Patient Name: Cleopatra Lozada  MRN: [id number]  Admission Date: 4/10/2019  Primary Care Provider: Latesha Dias MD    Subjective:     Interval History: No acute events overnight.  Working well with PT. VSS, AF. No pain.     Post-Op Info:  Procedure(s) (LRB):  ENDARTERECTOMY-CAROTID, RIGHT (Right)   6 Days Post-Op       Medications:  Continuous Infusions:  Scheduled Meds:   aspirin  81 mg Oral Daily    atorvastatin  80 mg Oral Daily    clopidogrel  75 mg Oral Daily    heparin (porcine)  5,000 Units Subcutaneous Q8H    losartan  50 mg Oral Daily    metoprolol succinate  25 mg Oral Daily     PRN Meds:acetaminophen, albuterol-ipratropium, hydrALAZINE, labetalol, oxyCODONE, oxyCODONE     Objective:     Vital Signs (Most Recent):  Temp: 98.7 °F (37.1 °C) (04/16/19 0521)  Pulse: 65 (04/16/19 0521)  Resp: 18 (04/16/19 0521)  BP: (!) 109/57 (04/16/19 0521)  SpO2: (!) 92 % (04/16/19 0521) Vital Signs (24h Range):  Temp:  [96.8 °F (36 °C)-98.7 °F (37.1 °C)] 98.7 °F (37.1 °C)  Pulse:  [62-83] 65  Resp:  [16-18] 18  SpO2:  [90 %-95 %] 92 %  BP: (102-134)/(57-74) 109/57         Physical Exam   Constitutional: She is oriented to person, place, and time. She appears well-developed.   Neck: Normal range of motion. Neck supple. No JVD present. No tracheal deviation present.   R CEA incision c/d/i, minimal bruising   Cardiovascular: Normal rate and regular rhythm.   Pulmonary/Chest: Breath sounds normal. No respiratory distress.   Abdominal: Soft. She exhibits no distension and no mass. There is no tenderness. There is no rebound and no guarding.   Musculoskeletal: She exhibits no edema.   Neurological: She is alert and oriented to person, place, and time. No cranial nerve deficit or sensory deficit.   Skin: Skin is warm and dry.   Vitals reviewed.      Significant Labs:  CBC:   Recent Labs   Lab 04/15/19  0816   WBC 7.75   RBC 3.79*   HGB 10.5*   HCT 33.9*       MCV 89   MCH 27.7   MCHC 31.0*     CMP:   Recent Labs   Lab 04/15/19  0816   GLU 98   CALCIUM 8.6*      K 3.4*   CO2 24      BUN 11   CREATININE 0.9       Significant Diagnostics:  I have reviewed all pertinent imaging results/findings within the past 24 hours.    Assessment/Plan:     * H/O carotid endarterectomy  Patient is 6 Days Post-Op from R carotid endarterectomy.     - ASA, plavix  - tolerating regular diet  - BP control systolic < 160  - HR control, prn metoprolol  - restart home meds  - dvt ppx  - OOB to chair  - PT/OT    Dispo: Discharge today to Home with Home Health and PT  - Follow up: 1mo clinic visit with carotid US          Megan Cole MD  Vascular Surgery  [facility]

## 2019-04-16 NOTE — SUBJECTIVE & OBJECTIVE
Medications:  Continuous Infusions:  Scheduled Meds:   aspirin  81 mg Oral Daily    atorvastatin  80 mg Oral Daily    clopidogrel  75 mg Oral Daily    heparin (porcine)  5,000 Units Subcutaneous Q8H    losartan  50 mg Oral Daily    metoprolol succinate  25 mg Oral Daily     PRN Meds:acetaminophen, albuterol-ipratropium, hydrALAZINE, labetalol, oxyCODONE, oxyCODONE     Objective:     Vital Signs (Most Recent):  Temp: 98.7 °F (37.1 °C) (04/16/19 0521)  Pulse: 65 (04/16/19 0521)  Resp: 18 (04/16/19 0521)  BP: (!) 109/57 (04/16/19 0521)  SpO2: (!) 92 % (04/16/19 0521) Vital Signs (24h Range):  Temp:  [96.8 °F (36 °C)-98.7 °F (37.1 °C)] 98.7 °F (37.1 °C)  Pulse:  [62-83] 65  Resp:  [16-18] 18  SpO2:  [90 %-95 %] 92 %  BP: (102-134)/(57-74) 109/57         Physical Exam   Constitutional: She is oriented to person, place, and time. She appears well-developed.   Neck: Normal range of motion. Neck supple. No JVD present. No tracheal deviation present.   R CEA incision c/d/i, minimal bruising   Cardiovascular: Normal rate and regular rhythm.   Pulmonary/Chest: Breath sounds normal. No respiratory distress.   Abdominal: Soft. She exhibits no distension and no mass. There is no tenderness. There is no rebound and no guarding.   Musculoskeletal: She exhibits no edema.   Neurological: She is alert and oriented to person, place, and time. No cranial nerve deficit or sensory deficit.   Skin: Skin is warm and dry.   Vitals reviewed.      Significant Labs:  CBC:   Recent Labs   Lab 04/15/19  0816   WBC 7.75   RBC 3.79*   HGB 10.5*   HCT 33.9*      MCV 89   MCH 27.7   MCHC 31.0*     CMP:   Recent Labs   Lab 04/15/19  0816   GLU 98   CALCIUM 8.6*      K 3.4*   CO2 24      BUN 11   CREATININE 0.9       Significant Diagnostics:  I have reviewed all pertinent imaging results/findings within the past 24 hours.

## 2019-04-17 NOTE — PROGRESS NOTES
Spoke with patient today regarding [facility]. Patient states she is intersted in the program. She was smoking about 10 cpd, but has been quit for 1 week. I enrolled her in the Smoking Cessation trust. Discussed program and how it works in regards to benefit periods and appointments. Discussed the effects of smoking and benefits of quitting with patient, she verbalized understanding. Will follow up with patient after discharge to schedule an intake appointment. Contact information was provided.

## 2019-04-17 NOTE — PLAN OF CARE
SW received a call from admissions with PHN and patient will receive home health with [facility].      Jania Tuttle LMSW  [facility]   [id number]

## 2019-04-22 NOTE — PT/OT/SLP DISCHARGE
Physical Therapy Discharge Summary    Name: Cleopatra Lozada  MRN: [id number]   Principal Problem: H/O carotid endarterectomy     Patient Discharged from acute Physical Therapy on 2019.  Please refer to prior PT noted date on 4/15/2019 for functional status.     Assessment:     Goals partially met.    Objective:     GOALS:   Multidisciplinary Problems     Physical Therapy Goals     Not on file          Multidisciplinary Problems (Resolved)        Problem: Physical Therapy Goal    Goal Priority Disciplines Outcome Goal Variances Interventions   Physical Therapy Goal   (Resolved)     PT, PT/OT Outcome(s) achieved     Description:  Goals to be met by: 2019     Patient will increase functional independence with mobility by performin. Supine to sit with Modified [place] -   2. Sit to supine with Modified [place] -   3. Sit to stand transfer with Modified [place] - met  4. Bed to chair transfer with Modified [place] with/without Rolling Walker - met  5. Gait  x 300 feet with Modified [place] using Rolling Walker. - met  6. Lower extremity exercise program x15 reps per handout, with independence                       Reasons for Discontinuation of Therapy Services  Transfer to alternate level of care.      Plan:     Patient Discharged to: Home with Home Health Service.    Scotty Reddy, PT  2019

## 2019-04-29 ENCOUNTER — TELEPHONE (OUTPATIENT)
Dept: ADMINISTRATIVE | Facility: CLINIC | Age: 79
End: 2019-04-29

## 2019-04-29 NOTE — TELEPHONE ENCOUNTER
Home Health SOC 04/05/2019 - 06/03/2019 with [facility] - Dr. Josi Kelly. Patient received SN, PT and OT services.

## 2019-05-13 ENCOUNTER — HOSPITAL ENCOUNTER (OUTPATIENT)
Dept: CARDIOLOGY | Facility: HOSPITAL | Age: 79
Discharge: HOME | End: 2019-05-13
Attending: SURGERY
Payer: MEDICARE

## 2019-05-13 ENCOUNTER — OFFICE VISIT (OUTPATIENT)
Dept: VASCULAR SURGERY | Facility: CLINIC | Age: 79
End: 2019-05-13
Payer: MEDICARE

## 2019-05-13 VITALS
SYSTOLIC BLOOD PRESSURE: 120 MMHG | BODY MASS INDEX: 23.56 KG/M2 | DIASTOLIC BLOOD PRESSURE: 70 MMHG | HEIGHT: 59 IN | WEIGHT: 116.88 LBS

## 2019-05-13 DIAGNOSIS — I65.21 CAROTID STENOSIS, SYMPTOMATIC W/O INFARCT, RIGHT: ICD-10-CM

## 2019-05-13 DIAGNOSIS — I65.22 CAROTID STENOSIS, ASYMPTOMATIC, LEFT: ICD-10-CM

## 2019-05-13 DIAGNOSIS — Z98.890 HISTORY OF RIGHT-SIDED CAROTID ENDARTERECTOMY: Primary | ICD-10-CM

## 2019-05-13 PROCEDURE — 93880 EXTRACRANIAL BILAT STUDY: CPT | Mod: 26,,, | Performed by: SURGERY

## 2019-05-13 PROCEDURE — 99999 PR PBB SHADOW E&M-EST. PATIENT-LVL III: CPT | Mod: PBBFAC,,, | Performed by: SURGERY

## 2019-05-13 PROCEDURE — 93880 CV US DOPPLER CAROTID (CUPID ONLY): ICD-10-PCS | Mod: 26,,, | Performed by: SURGERY

## 2019-05-13 PROCEDURE — 99999 PR PBB SHADOW E&M-EST. PATIENT-LVL III: ICD-10-PCS | Mod: PBBFAC,,, | Performed by: SURGERY

## 2019-05-13 PROCEDURE — 93880 EXTRACRANIAL BILAT STUDY: CPT | Mod: 50

## 2019-05-13 PROCEDURE — 99024 PR POST-OP FOLLOW-UP VISIT: ICD-10-PCS | Mod: S$GLB,,, | Performed by: SURGERY

## 2019-05-13 PROCEDURE — 99024 POSTOP FOLLOW-UP VISIT: CPT | Mod: S$GLB,,, | Performed by: SURGERY

## 2019-05-13 NOTE — LETTER
May 13, 2019        Latesha Dias MD  [address] [facility] [address]  [facility] [address]             [facility] - Vascular Surgery  [address]  [facility] [address]  Phone: [phone]  Fax: [phone]   Patient: Cleopatra Lozada   MR Number: [id number]   YOB: 1940   Date of Visit: 5/13/2019       Dear Dr. Lucina Dias:    Thank you for referring Cleopatra Lozada to me for evaluation. Below are the relevant portions of my assessment and plan of care.            If you have questions, please do not hesitate to call me. I look forward to following Cleopatra along with you.    Sincerely,      Pietro Steward MD           CC  No Recipients

## 2019-05-13 NOTE — PROGRESS NOTES
Pietro Steward MD [facility]                         2019    HPI:  Cleopatra Lozada is a 78 y.o. female with   Patient Active Problem List   Diagnosis    Abdominal aortic aneurysm    Acute CVA (cerebrovascular accident)    Essential hypertension    CKD (chronic kidney disease) stage 3, GFR 30-59 ml/min    Preop cardiovascular exam    H/O carotid endarterectomy    being managed by PCP and specialists who is here today for evaluation of R CEA 4/10/19.  Pt recovering well although states her strength is not 100% to LLE.  No new TIA or CVA.  Completed postop PT.    no MI  Yes TIA  Tobacco use: quit 2019    Past Medical History:   Diagnosis Date    Aneurysm     abdominal, repaired 11/15/2011    Cigarette smoker     patient reports buying loose tobacco and rolling with a machine, at home    Hypertension     TIA (transient ischemic attack)     patient states 3rd episode     Past Surgical History:   Procedure Laterality Date    ABDOMINAL AORTIC ANEURYSM REPAIR  11/15/2011    ENDARTERECTOMY-CAROTID, RIGHT Right 4/10/2019    Performed by [facility]ward [facility] at [facility]    TONSILLECTOMY       Family History   Problem Relation Age of Onset    Heart disease Mother     Cancer Father      Social History     Socioeconomic History    Marital status: Single     Spouse name: Not on file    Number of children: Not on file    Years of education: Not on file    Highest education level: Not on file   Occupational History    Not on file   Social Needs    Financial resource strain: Not on file    Food insecurity:     Worry: Not on file     Inability: Not on file    Transportation needs:     Medical: Not on file     Non-medical: Not on file   Tobacco Use    Smoking status: Former Smoker     Packs/day: 0.50     Types: Cigarettes     Last attempt to quit: 3/11/2019     Years since quittin.1    Smokeless tobacco: Never Used    Tobacco comment:  reports using loose tobacco & rolling herself, by machine   Substance and Sexual Activity    Alcohol use: No    Drug use: No    Sexual activity: Not on file   Lifestyle    Physical activity:     Days per week: Not on file     Minutes per session: Not on file    Stress: Not on file   Relationships    Social connections:     Talks on phone: Not on file     Gets together: Not on file     Attends Hindu service: Not on file     Active member of club or organization: Not on file     Attends meetings of clubs or organizations: Not on file     Relationship status: Not on file   Other Topics Concern    Not on file   Social History Narrative    Not on file       Current Outpatient Medications:     aspirin (ECOTRIN) 81 MG EC tablet, Take 1 tablet (81 mg total) by mouth once daily., Disp: , Rfl: 0    atorvastatin (LIPITOR) 80 MG tablet, Take 1 tablet (80 mg total) by mouth once daily., Disp: 90 tablet, Rfl: 3    clopidogrel (PLAVIX) 75 mg tablet, Take 1 tablet (75 mg total) by mouth once daily., Disp: 30 tablet, Rfl: 11    losartan (COZAAR) 50 MG tablet, Take 1 tablet (50 mg total) by mouth once daily., Disp: 90 tablet, Rfl: 3    metoprolol succinate (TOPROL-XL) 25 MG 24 hr tablet, Take 1 tablet (25 mg total) by mouth once daily., Disp: 30 tablet, Rfl: 2    REVIEW OF SYSTEMS:  General: No fevers or chills; ENT: No sore throat; Allergy and Immunology: no persistent infections; Hematological and Lymphatic: No history of bleeding or easy bruising; Endocrine: negative; Respiratory: no cough, shortness of breath, or wheezing; Cardiovascular: no chest pain or dyspnea on exertion; Gastrointestinal: no abdominal pain/back, change in bowel habits, or bloody stools; Genito-Urinary: no dysuria, trouble voiding, or hematuria; Musculoskeletal: negative; Neurological: no TIA or stroke symptoms; Psychiatric: no nervousness, anxiety or depression.    PHYSICAL EXAM:   Left Arm BP - Sittin/60 (19 1404)           Constitutional: She appears well-developed and well-nourished. No distress.   HENT:   Head: Normocephalic and atraumatic.   Eyes: Conjunctivae and EOM are normal.   Neck: Neck supple. Inc healing well, no infection  Cardiovascular: Normal rate.   Pulses:       Radial pulses are 2+ on the right side, and 2+ on the left side.        Posterior tibial pulses are 1+ on the right side, and 1+ on the left side.   Pulmonary/Chest: Effort normal. No respiratory distress.   Abdominal: Soft. She exhibits no distension and no mass. There is no tenderness. There is no rebound and no guarding. No hernia.   Musculoskeletal: Normal range of motion. She exhibits no edema, tenderness or deformity.   Neurological: She is alert. No cranial nerve deficit or sensory deficit.  Tongue midline.  Skin: Skin is warm and dry. Capillary refill takes less than 2 seconds. No rash noted. She is not diaphoretic. No pallor.   Psychiatric: She has a normal mood and affect.   Vitals reviewed.    LAB RESULTS:  No results found for: CBC  Lab Results   Component Value Date    LABPROT 10.6 04/02/2019    INR 1.0 04/02/2019     Lab Results   Component Value Date     04/15/2019    K 3.4 (L) 04/15/2019     04/15/2019    CO2 24 04/15/2019    GLU 98 04/15/2019    BUN 11 04/15/2019    CREATININE 0.9 04/15/2019    CALCIUM 8.6 (L) 04/15/2019    ANIONGAP 10 04/15/2019    EGFRNONAA >60.0 04/15/2019     Lab Results   Component Value Date    WBC 7.75 04/15/2019    RBC 3.79 (L) 04/15/2019    HGB 10.5 (L) 04/15/2019    HCT 33.9 (L) 04/15/2019    MCV 89 04/15/2019    MCH 27.7 04/15/2019    MCHC 31.0 (L) 04/15/2019    RDW 14.1 04/15/2019     04/15/2019    MPV 10.8 04/15/2019    GRAN 5.7 04/15/2019    GRAN 73.3 (H) 04/15/2019    LYMPH 1.2 04/15/2019    LYMPH 15.4 (L) 04/15/2019    MONO 0.5 04/15/2019    MONO 6.8 04/15/2019    EOS 0.3 04/15/2019    BASO 0.01 04/15/2019    EOSINOPHIL 4.1 04/15/2019    BASOPHIL 0.1 04/15/2019    DIFFMETHOD Automated  04/15/2019     .  Lab Results   Component Value Date    HGBA1C 5.4 04/02/2019       IMAGING:  All pertinent imaging has been reviewed and interpreted independently.    Carotid US 5/2019 shows no HD significant stenosis, patch patent with plaque noted posteriorly, dampened R CCA waveforms  1-39% R ICA stenosis  60-79% L ICA stenosis    IMP/PLAN:  78 y.o. female with   Patient Active Problem List   Diagnosis    Abdominal aortic aneurysm    Acute CVA (cerebrovascular accident)    Essential hypertension    CKD (chronic kidney disease) stage 3, GFR 30-59 ml/min    Preop cardiovascular exam    H/O carotid endarterectomy    being managed by PCP and specialists who is here today for evaluation of [place] s/p R CEA 4/2019.    -Recovering well postop; concern for proximal arterial occlusive disease and plaque to posterior ICA where repair sutures were placed due to plaque erosion - will obtain CTA to evaluate arch, innominate and repair  -Cont ASA, Plavix, statin and BP control  -Cont therapy exercises  -Will contact pt with imaging results    I spent 20 minutes evaluating this patient and greater than 50% of the time was spent counseling, coordinator care and discussing the plan of care.  All questions were answered and patient stated understanding with agreement with the above treatment plan.    Pietro Steward MD [facility]  Vascular and Endovascular Surgery

## 2019-05-13 NOTE — PATIENT INSTRUCTIONS
Discharge Instructions for Carotid Endarterectomy   Your doctor performed surgery called a carotid endarterectomy. This is the most common way to restore normal blood flow through the vessels that carry blood to your brain. These vessels are called the carotid arteries. During the surgery, a surgeon made a small incisionin the side of your neck, just below your jaw. The artery was opened and the blockage was cleared. This procedure was done to reduce your risk of a stroke, which can occur when the carotid arteries are severely blocked or narrowed.     Ask a friend or family member to help with chores, especially those that involve lifting.   Home care  · Spend your first few days after surgery relaxing at home. It's OK to do quiet activities such as reading or watching TV.  · Take your medications exactly as instructed. Dont skip doses.  · Dont drive until your doctor says its OK. This will most likely take 1 to 2 weeks.  · Keep the wound dry until your doctor says it's OK to shower. Don't scrub your incision.  · Avoid strenuous activity for 7 to 10 days after your surgery.  · Dont lift anything heavier than 10 pounds for 2 to 3 weeks after your surgery.  · Ask your doctor when you can expect to return to work.  · Shave carefully around your incision. You may want to use an electric razor.  · Gradually increase your activity. It may take some time for you to return to your normal activities.  · Check your incision every day for signs of infection (redness, swelling, drainage, or warmth).  · Dont be alarmed if you have some loss of feeling along your jaw line, the incision line, and earlobe. This is a result of the incision and usually goes away after 6 to 12 months.  Long-term changes at home  · Eat a healthy, low-fat, low cholesterol, and low calorie diet. Ask your doctor for menus and other diet information.  · Maintain your ideal body weight.  · After you have recovered from surgery, try to exercise more,  especially walking. Ask your doctor for guidance.  · If you smoke ask your doctor for help quitting.   Follow-up care  Make a follow-up appointment with your doctor as directed.     When to call your doctor  Call your doctor immediately if you have any of the following (or go to the emergency room if your doctor's office is closed):  · Neck swelling  · Headache, particularly if it does not go away after a couple of hours  · Redness, pain, swelling, or drainage from your incision  · Fever above 100°F (37.7°C)  · Numbness or weakness in your face, arms, or legs  · Sudden changes in your vision  · Loss of vision in 1 eye  · Trouble speaking  · Trouble breathing  · Trouble swallowing   Date Last Reviewed: 6/13/2015  © 1161-2797 [employer]. [address] [place] [address], [place] [address]. All rights reserved. This information is not intended as a substitute for professional medical care. Always follow your healthcare professional's instructions.

## 2019-05-14 LAB
LEFT CBA DIAS: 46 CM/S
LEFT CBA SYS: 199 CM/S
LEFT CCA DIST DIAS: 16 CM/S
LEFT CCA DIST SYS: 57 CM/S
LEFT CCA MID DIAS: 16 CM/S
LEFT CCA MID SYS: 64 CM/S
LEFT CCA PROX DIAS: 14 CM/S
LEFT CCA PROX SYS: 63 CM/S
LEFT ECA DIAS: 19 CM/S
LEFT ECA SYS: 117 CM/S
LEFT ICA DIST DIAS: 21 CM/S
LEFT ICA DIST SYS: 55 CM/S
LEFT ICA MID DIAS: 15 CM/S
LEFT ICA MID SYS: 49 CM/S
LEFT ICA PROX DIAS: 33 CM/S
LEFT ICA PROX SYS: 187 CM/S
LEFT VERTEBRAL DIAS: 13 CM/S
LEFT VERTEBRAL SYS: 47 CM/S
OHS CV CAROTID RIGHT ICA EDV HIGHEST: 38
OHS CV CAROTID ULTRASOUND LEFT ICA/CCA RATIO: 2.92
OHS CV CAROTID ULTRASOUND RIGHT ICA/CCA RATIO: 1.92
OHS CV PV CAROTID LEFT HIGHEST CCA: 64
OHS CV PV CAROTID LEFT HIGHEST ICA: 187
OHS CV PV CAROTID RIGHT HIGHEST CCA: 64
OHS CV PV CAROTID RIGHT HIGHEST ICA: 123
OHS CV US CAROTID LEFT HIGHEST EDV: 33
RIGHT CBA DIAS: 8 CM/S
RIGHT CBA SYS: 33 CM/S
RIGHT CCA DIST DIAS: 11 CM/S
RIGHT CCA DIST SYS: 35 CM/S
RIGHT CCA MID DIAS: 15 CM/S
RIGHT CCA MID SYS: 46 CM/S
RIGHT CCA PROX DIAS: 18 CM/S
RIGHT CCA PROX SYS: 64 CM/S
RIGHT ECA DIAS: 48 CM/S
RIGHT ECA SYS: 404 CM/S
RIGHT ICA DIST DIAS: 19 CM/S
RIGHT ICA DIST SYS: 58 CM/S
RIGHT ICA MID DIAS: 13 CM/S
RIGHT ICA MID SYS: 48 CM/S
RIGHT ICA PROX DIAS: 38 CM/S
RIGHT ICA PROX SYS: 123 CM/S
RIGHT VERTEBRAL DIAS: 16 CM/S
RIGHT VERTEBRAL SYS: 73 CM/S

## 2019-05-16 ENCOUNTER — TELEPHONE (OUTPATIENT)
Dept: VASCULAR SURGERY | Facility: CLINIC | Age: 79
End: 2019-05-16

## 2019-06-03 ENCOUNTER — TELEPHONE (OUTPATIENT)
Dept: VASCULAR SURGERY | Facility: CLINIC | Age: 79
End: 2019-06-03

## 2019-06-05 ENCOUNTER — TELEPHONE (OUTPATIENT)
Dept: VASCULAR SURGERY | Facility: CLINIC | Age: 79
End: 2019-06-05

## 2019-06-05 NOTE — TELEPHONE ENCOUNTER
Call to patient to explain that Dr. Steward stated that she should see her primary care doctor for her right foot swelling. She stated she has a appointment coming up and she will mention it to her.

## 2019-08-12 ENCOUNTER — HOSPITAL ENCOUNTER (OUTPATIENT)
Dept: RADIOLOGY | Facility: HOSPITAL | Age: 79
Discharge: HOME | End: 2019-08-12
Attending: SURGERY
Payer: MEDICARE

## 2019-08-12 DIAGNOSIS — I65.22 CAROTID STENOSIS, ASYMPTOMATIC, LEFT: ICD-10-CM

## 2019-08-12 DIAGNOSIS — Z98.890 HISTORY OF RIGHT-SIDED CAROTID ENDARTERECTOMY: ICD-10-CM

## 2019-08-12 PROCEDURE — 70498 CT ANGIOGRAPHY NECK: CPT | Mod: 26,,, | Performed by: RADIOLOGY

## 2019-08-12 PROCEDURE — 70498 CTA NECK: ICD-10-PCS | Mod: 26,,, | Performed by: RADIOLOGY

## 2019-08-12 PROCEDURE — 70498 CT ANGIOGRAPHY NECK: CPT | Mod: TC

## 2019-08-12 PROCEDURE — 25500020 PHARM REV CODE 255: Performed by: SURGERY

## 2019-08-12 RX ADMIN — IOHEXOL 75 ML: 350 INJECTION, SOLUTION INTRAVENOUS at 04:08

## 2019-08-15 DIAGNOSIS — I65.23 BILATERAL CAROTID ARTERY STENOSIS: Primary | ICD-10-CM

## 2019-08-15 DIAGNOSIS — I82.622 DEEP VEIN THROMBOSIS (DVT) OF LEFT UPPER EXTREMITY, UNSPECIFIED CHRONICITY, UNSPECIFIED VEIN: ICD-10-CM

## 2019-09-04 ENCOUNTER — TELEPHONE (OUTPATIENT)
Dept: VASCULAR SURGERY | Facility: CLINIC | Age: 79
End: 2019-09-04

## 2020-07-20 ENCOUNTER — ANESTHESIA EVENT (OUTPATIENT)
Dept: ANESTHESIOLOGY | Facility: HOSPITAL | Age: 80
End: 2020-07-20
Payer: MEDICARE

## 2020-07-20 ENCOUNTER — HOSPITAL ENCOUNTER (EMERGENCY)
Facility: HOSPITAL | Age: 80
Discharge: HOME | End: 2020-07-20
Attending: EMERGENCY MEDICINE | Admitting: EMERGENCY MEDICINE
Payer: MEDICARE

## 2020-07-20 ENCOUNTER — ANESTHESIA (OUTPATIENT)
Dept: ANESTHESIOLOGY | Facility: HOSPITAL | Age: 80
End: 2020-07-20
Payer: MEDICARE

## 2020-07-20 VITALS
RESPIRATION RATE: 15 BRPM | HEART RATE: 48 BPM | OXYGEN SATURATION: 97 % | HEIGHT: 60 IN | DIASTOLIC BLOOD PRESSURE: 87 MMHG | BODY MASS INDEX: 23.56 KG/M2 | SYSTOLIC BLOOD PRESSURE: 162 MMHG | WEIGHT: 120 LBS | TEMPERATURE: 98 F
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
## 2020-07-20 DIAGNOSIS — S43.015A ANTERIOR DISLOCATION OF LEFT SHOULDER, INITIAL ENCOUNTER: ICD-10-CM

## 2020-07-20 DIAGNOSIS — S49.92XA INJURY OF HUMERUS, LEFT, INITIAL ENCOUNTER: ICD-10-CM

## 2020-07-20 PROCEDURE — 23650 CLTX SHO DSLC W/MNPJ WO ANES: CPT | Mod: LT

## 2020-07-20 PROCEDURE — 96374 THER/PROPH/DIAG INJ IV PUSH: CPT

## 2020-07-20 PROCEDURE — 99285 EMERGENCY DEPT VISIT HI MDM: CPT | Mod: 25

## 2020-07-20 PROCEDURE — 37000008 HC ANESTHESIA 1ST 15 MINUTES

## 2020-07-20 PROCEDURE — 25000003 PHARM REV CODE 250: Performed by: NURSE PRACTITIONER

## 2020-07-20 PROCEDURE — 63600175 PHARM REV CODE 636 W HCPCS: Performed by: NURSE PRACTITIONER

## 2020-07-20 PROCEDURE — D9220A PRA ANESTHESIA: ICD-10-PCS | Mod: ,,, | Performed by: ANESTHESIOLOGY

## 2020-07-20 PROCEDURE — 23655 CLTX SHO DSLC W/MNPJ W/ANES: CPT | Mod: LT

## 2020-07-20 PROCEDURE — 25000003 PHARM REV CODE 250: Performed by: ANESTHESIOLOGY

## 2020-07-20 PROCEDURE — 37000009 HC ANESTHESIA EA ADD 15 MINS

## 2020-07-20 PROCEDURE — D9220A PRA ANESTHESIA: Mod: ,,, | Performed by: ANESTHESIOLOGY

## 2020-07-20 PROCEDURE — 63600175 PHARM REV CODE 636 W HCPCS: Performed by: ANESTHESIOLOGY

## 2020-07-20 RX ORDER — HYDROCODONE BITARTRATE AND ACETAMINOPHEN 5; 325 MG/1; MG/1
1 TABLET ORAL EVERY 6 HOURS PRN
Qty: 12 TABLET | Refills: 0 | Status: SHIPPED | OUTPATIENT
Start: 2020-07-20 | End: 2021-03-02 | Stop reason: SDUPTHER

## 2020-07-20 RX ORDER — ETOMIDATE 2 MG/ML
INJECTION INTRAVENOUS
Status: DISCONTINUED | OUTPATIENT
Start: 2020-07-20 | End: 2020-07-20

## 2020-07-20 RX ORDER — LIDOCAINE HYDROCHLORIDE 20 MG/ML
INJECTION INTRAVENOUS
Status: DISCONTINUED | OUTPATIENT
Start: 2020-07-20 | End: 2020-07-20

## 2020-07-20 RX ORDER — MORPHINE SULFATE 10 MG/ML
6 INJECTION INTRAMUSCULAR; INTRAVENOUS; SUBCUTANEOUS
Status: COMPLETED | OUTPATIENT
Start: 2020-07-20 | End: 2020-07-20

## 2020-07-20 RX ORDER — LIDOCAINE HYDROCHLORIDE 10 MG/ML
10 INJECTION INFILTRATION; PERINEURAL
Status: COMPLETED | OUTPATIENT
Start: 2020-07-20 | End: 2020-07-20

## 2020-07-20 RX ADMIN — MORPHINE SULFATE 6 MG: 10 INJECTION INTRAVENOUS at 03:07

## 2020-07-20 RX ADMIN — LIDOCAINE HYDROCHLORIDE 10 ML: 10 INJECTION, SOLUTION INFILTRATION; PERINEURAL at 03:07

## 2020-07-20 RX ADMIN — Medication 100 MG: at 05:07

## 2020-07-20 RX ADMIN — ETOMIDATE 10 MG: 2 INJECTION, SOLUTION INTRAVENOUS at 05:07

## 2020-07-20 NOTE — ANESTHESIA PREPROCEDURE EVALUATION
2020  Cleopatra Lozada is a 79 y.o., female.  To undergo Procedure(s) (LRB):  CLOSED REDUCTION OF LEFT SHOULDHER (Left)     Denies CP/SOB/GERD/MI/CVA/URI symptoms.  METS > 4  NPO > 8    Past Medical History:  Past Medical History:   Diagnosis Date    Aneurysm     abdominal, repaired 11/15/2011    Cigarette smoker     patient reports buying loose tobacco and rolling with a machine, at home    Hypertension     TIA (transient ischemic attack)     patient states 3rd episode       Past Surgical History:  Past Surgical History:   Procedure Laterality Date    ABDOMINAL AORTIC ANEURYSM REPAIR  11/15/2011    CAROTID ENDARTERECTOMY Right 4/10/2019    Procedure: ENDARTERECTOMY-CAROTID, RIGHT;  Surgeon: Pietro Steward MD;  Location: [facility] OR [address] [place] [address];  Service: Vascular;  Laterality: Right;    TONSILLECTOMY         Social History:  Social History     Socioeconomic History    Marital status: Single     Spouse name: Not on file    Number of children: Not on file    Years of education: Not on file    Highest education level: Not on file   Occupational History    Not on file   Social Needs    Financial resource strain: Not on file    Food insecurity     Worry: Not on file     Inability: Not on file    Transportation needs     Medical: Not on file     Non-medical: Not on file   Tobacco Use    Smoking status: Former Smoker     Packs/day: 0.50     Types: Cigarettes     Quit date: 3/11/2019     Years since quittin.3    Smokeless tobacco: Never Used    Tobacco comment: reports using loose tobacco & rolling herself, by machine   Substance and Sexual Activity    Alcohol use: No    Drug use: No    Sexual activity: Not on file   Lifestyle    Physical activity     Days per week: Not on file     Minutes per session: Not on file    Stress: Not on file   Relationships    Social connections     Talks  on phone: Not on file     Gets together: Not on file     Attends Episcopalian service: Not on file     Active member of club or organization: Not on file     Attends meetings of clubs or organizations: Not on file     Relationship status: Not on file   Other Topics Concern    Not on file   Social History Narrative    Not on file       Medications:  No current facility-administered medications on file prior to encounter.      Current Outpatient Medications on File Prior to Encounter   Medication Sig Dispense Refill    aspirin (ECOTRIN) 81 MG EC tablet Take 1 tablet (81 mg total) by mouth once daily.  0    atorvastatin (LIPITOR) 80 MG tablet Take 1 tablet (80 mg total) by mouth once daily. 90 tablet 3    clopidogrel (PLAVIX) 75 mg tablet Take 1 tablet (75 mg total) by mouth once daily. 30 tablet 11    losartan (COZAAR) 50 MG tablet Take 1 tablet (50 mg total) by mouth once daily. 90 tablet 3    metoprolol succinate (TOPROL-XL) 25 MG 24 hr tablet Take 1 tablet (25 mg total) by mouth once daily. 30 tablet 2       Allergies:  Review of patient's allergies indicates:   Allergen Reactions    Tetanus vaccines and toxoid Rash       Active Problems:  Patient Active Problem List   Diagnosis    Abdominal aortic aneurysm    Acute CVA (cerebrovascular accident)    Essential hypertension    CKD (chronic kidney disease) stage 3, GFR 30-59 ml/min    Preop cardiovascular exam    H/O carotid endarterectomy       Diagnostic Studies:  Results for CANDIDO COREAS S (MRN [id number]) as of 7/20/2020 17:14   Ref. Range 4/15/2019 08:16   WBC Latest Ref Range: 3.90 - 12.70 K/uL 7.75   RBC Latest Ref Range: 4.00 - 5.40 M/uL 3.79 (L)   Hemoglobin Latest Ref Range: 12.0 - 16.0 g/dL 10.5 (L)   Hematocrit Latest Ref Range: 37.0 - 48.5 % 33.9 (L)   MCV Latest Ref Range: 82 - 98 fL 89   MCH Latest Ref Range: 27.0 - 31.0 pg 27.7   MCHC Latest Ref Range: 32.0 - 36.0 g/dL 31.0 (L)   RDW Latest Ref Range: 11.5 - 14.5 % 14.1   Platelets Latest  Ref Range: 150 - 350 K/uL 283   MPV Latest Ref Range: 9.2 - 12.9 fL 10.8   Gran% Latest Ref Range: 38.0 - 73.0 % 73.3 (H)   Gran # (ANC) Latest Ref Range: 1.8 - 7.7 K/uL 5.7   Lymph% Latest Ref Range: 18.0 - 48.0 % 15.4 (L)   Lymph # Latest Ref Range: 1.0 - 4.8 K/uL 1.2   Mono% Latest Ref Range: 4.0 - 15.0 % 6.8   Mono # Latest Ref Range: 0.3 - 1.0 K/uL 0.5   Eosinophil% Latest Ref Range: 0.0 - 8.0 % 4.1   Eos # Latest Ref Range: 0.0 - 0.5 K/uL 0.3   Basophil% Latest Ref Range: 0.0 - 1.9 % 0.1   Baso # Latest Ref Range: 0.00 - 0.20 K/uL 0.01   nRBC Latest Ref Range: 0 /100 WBC 0   Differential Method Unknown Automated   Immature Grans (Abs) Latest Ref Range: 0.00 - 0.04 K/uL 0.02   Immature Granulocytes Latest Ref Range: 0.0 - 0.5 % 0.3   Sodium Latest Ref Range: 136 - 145 mmol/L 141   Potassium Latest Ref Range: 3.5 - 5.1 mmol/L 3.4 (L)   Chloride Latest Ref Range: 95 - 110 mmol/L 107   CO2 Latest Ref Range: 23 - 29 mmol/L 24   Anion Gap Latest Ref Range: 8 - 16 mmol/L 10   BUN, Bld Latest Ref Range: 8 - 23 mg/dL 11   Creatinine Latest Ref Range: 0.5 - 1.4 mg/dL 0.9   eGFR if non African American Latest Ref Range: >60 mL/min/1.73 m^2 >60.0   eGFR if African American Latest Ref Range: >60 mL/min/1.73 m^2 >60.0   Glucose Latest Ref Range: 70 - 110 mg/dL 98   Calcium Latest Ref Range: 8.7 - 10.5 mg/dL 8.6 (L)     EKG(4/13/19):  Sinus rhythm with Premature atrial complexes   Otherwise normal ECG     NM STress( 4/3/19):  · Normal myocardial perfusion scan  · Arrhythmias during stress: PACs.ISOLATED PACs  · There was no ST segment deviation noted during stress.  · The patient reported light headedness and abdominal pain during the stress test.  · The perfusion scan is free of evidence from myocardial ischemia or injury.  · LVEF post-stress is 78%  · The EKG portion of this study is negative for myocardial ischemia.    TTE: (4/13/19):  · Normal left ventricular systolic function. The estimated ejection fraction is  55%  · Concentric left ventricular remodeling.  · Mild aortic valve stenosis.  · Aortic valve area is 1.71 cm2; peak velocity is 2.33 m/s; mean gradient is 11.75 mmHg.     24 Hour Vitals:  Temp:  [36.6 °C (97.9 °F)] 36.6 °C (97.9 °F)  Pulse:  [68] 68  Resp:  [18-20] 18  SpO2:  [99 %] 99 %  BP: (191)/(85) 191/85   See Nursing Charting For Additional Vitals    Anesthesia Evaluation    I have reviewed the Patient Summary Reports.    I have reviewed the Nursing Notes.       Review of Systems  Anesthesia Hx:  No problems with previous Anesthesia   Denies Personal Hx of Anesthesia complications.   Social:  Former Smoker, No Alcohol Use    Cardiovascular:   Exercise tolerance: good Hypertension, well controlled ECG has been reviewed. H/O AAA s/p repair   Pulmonary:  Pulmonary Normal    Hepatic/GI:  Hepatic/GI Normal    Neurological:   TIA,        Physical Exam  General:  Well nourished    Airway/Jaw/Neck:   MP2, TMD > 3FB, teeth intact     Chest/Lungs:  Chest/Lungs Clear    Heart/Vascular:  Heart Findings: Normal            Anesthesia Plan  Type of Anesthesia, risks & benefits discussed:  Anesthesia Type:  general, MAC  Patient's Preference:   Intra-op Monitoring Plan: standard ASA monitors  Intra-op Monitoring Plan Comments:   Post Op Pain Control Plan: multimodal analgesia  Post Op Pain Control Plan Comments:   Induction:   IV  Beta Blocker:  Patient is on a Beta-Blocker and has received one dose within the past 24 hours (No further documentation required).       Informed Consent: Patient understands risks and agrees with Anesthesia plan.  Questions answered. Anesthesia consent signed with patient.  ASA Score: 2     Day of Surgery Review of History & Physical:  There are no significant changes.          Ready For Surgery From Anesthesia Perspective.

## 2020-07-20 NOTE — ANESTHESIA POSTPROCEDURE EVALUATION
Anesthesia Post Evaluation    Patient: Cleopatra Lozada    Procedure(s) Performed: Procedure(s) (LRB):  CLOSED REDUCTION OF LEFT SHOULDHER (Left)    Final Anesthesia Type: MAC    Patient location during evaluation: ED  Patient participation: Yes- Able to Participate  Level of consciousness: awake and alert and oriented  Post-procedure vital signs: reviewed and stable  Pain management: adequate  Airway patency: patent    PONV status at discharge: No PONV  Anesthetic complications: no      Cardiovascular status: hemodynamically stable and blood pressure returned to baseline  Respiratory status: spontaneous ventilation, room air and unassisted  Hydration status: euvolemic  Follow-up not needed.          Vitals Value Taken Time   /101 07/20/20 1752   Temp 36.6 °C (97.9 °F) 07/20/20 1311   Pulse 51 07/20/20 1754   Resp 21 07/20/20 1754   SpO2 100 % 07/20/20 1754   Vitals shown include unvalidated device data.      No case tracking events are documented in the log.      Pain/Jie Score: Pain Rating Prior to Med Admin: 8 (7/20/2020  3:55 PM)

## 2020-07-20 NOTE — ED NOTES
Patient placed on continuous cardiac monitor, automatic blood pressure cuff and continuous pulse oximeter, sitting  up in bed, pt stated she was unable to move arm to place gown on

## 2020-07-20 NOTE — ED NOTES
Rn assisted with a closed reduction of the L shoulder. Pt starting vitals were HR 51, /88, 97%, RR 22. Saline and O2 with capnometry was started at 1726, timeout was performed at 1726. MD anesthesia pushed 100 of lidocaine and 10 of etomidate. Procedure was started at 1728 with MD Nelson and CHAD Sosa at bedside. Vitals at 1732 were 143/88, HR 54, O2 93%, RR 19. Procedure ended at 1735 with a successful closed reduction of L shoulder. Pt is sleepy but awake, responding to verbal commands.

## 2020-07-20 NOTE — ED TRIAGE NOTES
Pt presents to the ED via personal transportation reporting a fall that occurred about a week ago and is currently c/o left shoulder and arm pain.

## 2020-07-20 NOTE — ED PROVIDER NOTES
Encounter Date: 2020       History     Chief Complaint   Patient presents with    Shoulder Pain     pt. reports she fall about 1 week ago and is reporting left shoulder/arm pain.      HPI   Pt is an 79 y.o. female who states that one week ago she was at a grocery store and pulled on a cart to free it from other carts and she fell onto her knees and left shoulder.  She reports pain in the left shoulder since then, denies numbness or tingling distal.  Denies pain in knees but reports abrasions to both.  States she did not hit her head or sustain other injuries.  Current severity of pain is 10/10.    Review of patient's allergies indicates:   Allergen Reactions    Tetanus vaccines and toxoid Rash     Past Medical History:   Diagnosis Date    Aneurysm     abdominal, repaired 11/15/2011    Cigarette smoker     patient reports buying loose tobacco and rolling with a machine, at home    Hypertension     TIA (transient ischemic attack)     patient states 3rd episode     Past Surgical History:   Procedure Laterality Date    ABDOMINAL AORTIC ANEURYSM REPAIR  11/15/2011    CAROTID ENDARTERECTOMY Right 4/10/2019    Procedure: ENDARTERECTOMY-CAROTID, RIGHT;  Surgeon: Pietro Steward MD;  Location: [facility] OR [address] [place] [address];  Service: Vascular;  Laterality: Right;    TONSILLECTOMY       Family History   Problem Relation Age of Onset    Heart disease Mother     Cancer Father      Social History     Tobacco Use    Smoking status: Former Smoker     Packs/day: 0.50     Types: Cigarettes     Quit date: 3/11/2019     Years since quittin.3    Smokeless tobacco: Never Used    Tobacco comment: reports using loose tobacco & rolling herself, by machine   Substance Use Topics    Alcohol use: No    Drug use: No     Review of Systems   Constitutional: Negative for chills, fatigue and fever.   HENT: Negative for congestion, ear discharge, ear pain, postnasal drip, rhinorrhea, sinus pressure, sneezing, sore throat and voice  change.    Eyes: Negative for discharge and itching.   Respiratory: Negative for cough, shortness of breath and wheezing.    Cardiovascular: Negative for chest pain, palpitations and leg swelling.   Gastrointestinal: Negative for abdominal pain, constipation, diarrhea, nausea and vomiting.   Endocrine: Negative for polydipsia, polyphagia and polyuria.   Genitourinary: Negative for dysuria, frequency, hematuria, urgency, vaginal bleeding, vaginal discharge and vaginal pain.   Musculoskeletal: Positive for arthralgias. Negative for myalgias.   Skin: Negative for rash and wound (abrasions).   Neurological: Negative for dizziness, seizures, syncope, weakness and numbness.   Hematological: Negative for adenopathy. Does not bruise/bleed easily.   Psychiatric/Behavioral: Negative for self-injury and suicidal ideas. The patient is not nervous/anxious.        Physical Exam     Initial Vitals [07/20/20 1311]   BP Pulse Resp Temp SpO2   (!) 191/85 68 20 97.9 °F (36.6 °C) 99 %      MAP       --         Physical Exam    Nursing note and vitals reviewed.  Constitutional: She appears well-developed and well-nourished.   HENT:   Head: Normocephalic and atraumatic.   Right Ear: External ear normal.   Left Ear: External ear normal.   Nose: Nose normal.   Eyes: Conjunctivae and EOM are normal. Pupils are equal, round, and reactive to light. Right eye exhibits no discharge. Left eye exhibits no discharge.   Neck: Normal range of motion.   Abdominal: She exhibits no distension.   Musculoskeletal:      Left shoulder: She exhibits decreased range of motion, tenderness and pain.      Left upper arm: She exhibits tenderness and swelling.        Legs:    Neurological: She is alert and oriented to person, place, and time.   Skin: Skin is dry. Capillary refill takes less than 2 seconds.         ED Course   Orthopedic Injury    Date/Time: 8/13/2020 2:59 PM  Performed by: Ian Hendrix DNP  Authorized by: Mao Nelson MD     Location  procedure was performed:  [facility] EMERGENCY DEPARTMENT  Pre-operative diagnosis:  Anterior dislocation of the left shoulder  Post-operative diagnosis:  Anterior dislocation ofthe left shoudler  Injury:     Injury location:  Shoulder    Location details:  Left shoulder    Injury type:  Dislocation    Dislocation type: anterior      Chronicity:  New      Pre-procedure assessment:     Neurovascular status: Neurovascularly intact      Distal perfusion: normal      Neurological function: normal      Range of motion: normal      Local anesthesia used?: Yes      Anesthesia:  Hematoma block    Local anesthetic:  Lidocaine 1% without epinephrine    Anesthetic total (ml):  10      Selections made in this section will also lock the Injury type section above.:     Manipulation performed?: Yes      Reduction method:  Scapular manipulation, Naseem maneuver and Spaso technique    Reduction method:  Scapular manipulation, Naseem maneuver and Spaso technique    Reduction method:  Scapular manipulation, Naseem maneuver and Spaso technique    Reduction method:  Scapular manipulation, Naseem maneuver and Spaso technique    Reduction method:  Scapular manipulation, Naseem maneuver and Spaso technique    Reduction method:  Scapular manipulation, Naseem maneuver and Spaso technique    Reduction successful?: Yes      Confirmation: Reduction confirmed by x-ray      Immobilization: sling and swath.    Complications: No      Specimens: No    Post-procedure assessment:     Neurovascular status: Neurovascularly intact      Distal perfusion: normal      Neurological function: normal      Range of motion: normal      Patient tolerance:  Patient tolerated the procedure well with no immediate complications      Labs Reviewed - No data to display       Imaging Results          X-Ray Shoulder 2 or More Views Left (Final result)  Result time 07/20/20 18:42:28    Final result by Denver José MD (07/20/20 18:42:28)                 Impression:       As above.      Electronically signed by: Denver José MD  Date:    07/20/2020  Time:    18:42             Narrative:    EXAMINATION:  XR SHOULDER COMPLETE 2 OR MORE VIEWS LEFT    CLINICAL HISTORY:  s/p closed reduction;    TECHNIQUE:  Two views of the left shoulder were performed.    COMPARISON  14:57.    FINDINGS:  Successful interval reduction of previously visualized left shoulder dislocation.  No acute displaced fracture seen.                               X-Ray Shoulder 2 or More Views Left (Final result)  Result time 07/20/20 15:17:23    Final result by Zane Hart MD (07/20/20 15:17:23)                 Impression:      Anterior dislocation of the left shoulder joint as above.      Electronically signed by: Zane Hart MD  Date:    07/20/2020  Time:    15:17             Narrative:    EXAMINATION:  XR SHOULDER COMPLETE 2 OR MORE VIEWS LEFT    CLINICAL HISTORY:  fall, left shoulder and humerus pain;    TECHNIQUE:  Two or three views of the left shoulder were performed.    COMPARISON:  None    FINDINGS:  Three views of the left shoulder are performed.  There is an anterior dislocation of the left shoulder joint.  No definite signs for acute fractures.  Acromioclavicular joint is within normal limits.  No clavicular fracture.  The visualized ribs demonstrate no significant abnormalities.                                 Medical Decision Making:   Initial Assessment:   79y.o. female 1w s/p mechanical fall with left shoulder pain and scattered abrasions.  Distal pulse sensation and movement intact to left hand and elbow.  Differential Diagnosis:   Fracture, dislocation, sprain, strain, septic joint, wound infection, tetanus  ED Management:  Xray of the left shoulder and left humerus ordered                   ED Course as of Jul 20 1937   Mon Jul 20, 2020   1525    Anterior dislocation of the left shoulder joint as above.   X-Ray Shoulder 2 or More Views Left [VC]   1525 See procedure note for intraarticular  joint injection of lidocaine.    [VC]   1554 Dr. Nelson is present and applying traction to the left arm to fatigue the arm. Will attempt reduction shortly.    [VC]   1607 Dr. Nelson attempting reduction without success, he is going to consult anesthesia to consider moderate sedation.    [VC]   1708 Awaiting anesthesia for reduction with moderate sedation.  Dr. Lemos to assist.    [VC]   1732 See procedure note for reduction under moderate sedation.    [VC]   1847 Successful interval reduction of previously visualized left shoulder dislocation.  No acute displaced fracture seen.   X-Ray Shoulder 2 or More Views Left [VC]      ED Course User Index  [VC] Ian Hendrix DNP     Patient's left arm was placed in a sling and swath and she was discharged home once completely back to neurologic baseline.  She should return for any worsening or changes in condition.  A prescription for pain medication was provided with a drowsy warning.  She should follow up with orthopedic referral provided.See above for analyses of radiology, labs, and events during pt's visit and direct actions taken. Symptomatic therapies and return precautions on AVS.   Medication choices were made after reviewing allergies, medications, history, available laboratories. See below for discharge prescriptions if any and disposition.             Clinical Impression:       ICD-10-CM ICD-9-CM   1. Fall  W19.XXXA E888.9   2. Injury of humerus, left, initial encounter  S49.92XA 959.2   3. Anterior dislocation of left shoulder, initial encounter  S43.015A 831.01         Disposition:   Disposition: Discharged  Condition: Stable     ED Disposition Condition    Discharge Stable        ED Prescriptions     Medication Sig Dispense Start Date End Date Auth. Provider    HYDROcodone-acetaminophen (NORCO) 5-325 mg per tablet Take 1 tablet by mouth every 6 (six) hours as needed. 12 tablet 7/20/2020  Ian Hendrix DNP        Follow-up Information     Follow  up With Specialties Details Why Contact Info    Nafisa Rizvi MD Orthopedic Surgery Schedule an appointment as soon as possible for a visit   [address] [facility] [address]  [phone]                                       Ian Hendrix, [facility]  08/13/20 3366

## 2020-07-22 ENCOUNTER — TELEPHONE (OUTPATIENT)
Dept: ORTHOPEDICS | Facility: CLINIC | Age: 80
End: 2020-07-22

## 2020-07-22 NOTE — TELEPHONE ENCOUNTER
Trying to call Mrs. Lozada to let her know about up coming appt with Dr. Rizvi. On 07/27/20. But there was no answer and I have left message for her to call the clinic back. Called her son Mr. Kelvin Lozada but he stay out of town and also is unable to get in touch with her. I have mail out her appt for that day and time. Thanks

## 2020-07-27 ENCOUNTER — OFFICE VISIT (OUTPATIENT)
Dept: ORTHOPEDICS | Facility: CLINIC | Age: 80
End: 2020-07-27
Payer: MEDICARE

## 2020-07-27 VITALS
WEIGHT: 121.25 LBS | HEART RATE: 75 BPM | RESPIRATION RATE: 18 BRPM | BODY MASS INDEX: 23.81 KG/M2 | SYSTOLIC BLOOD PRESSURE: 112 MMHG | TEMPERATURE: 98 F | OXYGEN SATURATION: 97 % | DIASTOLIC BLOOD PRESSURE: 84 MMHG | HEIGHT: 60 IN

## 2020-07-27 DIAGNOSIS — S43.085A CLOSED DISLOCATION OF LEFT GLENOHUMERAL JOINT, INITIAL ENCOUNTER: Primary | ICD-10-CM

## 2020-07-27 PROCEDURE — 99203 PR OFFICE/OUTPT VISIT, NEW, LEVL III, 30-44 MIN: ICD-10-PCS | Mod: S$GLB,,, | Performed by: ORTHOPAEDIC SURGERY

## 2020-07-27 PROCEDURE — 99203 OFFICE O/P NEW LOW 30 MIN: CPT | Mod: S$GLB,,, | Performed by: ORTHOPAEDIC SURGERY

## 2020-07-27 PROCEDURE — 1126F PR PAIN SEVERITY QUANTIFIED, NO PAIN PRESENT: ICD-10-PCS | Mod: S$GLB,,, | Performed by: ORTHOPAEDIC SURGERY

## 2020-07-27 PROCEDURE — 1126F AMNT PAIN NOTED NONE PRSNT: CPT | Mod: S$GLB,,, | Performed by: ORTHOPAEDIC SURGERY

## 2020-07-27 PROCEDURE — 1159F MED LIST DOCD IN RCRD: CPT | Mod: S$GLB,,, | Performed by: ORTHOPAEDIC SURGERY

## 2020-07-27 PROCEDURE — 1159F PR MEDICATION LIST DOCUMENTED IN MEDICAL RECORD: ICD-10-PCS | Mod: S$GLB,,, | Performed by: ORTHOPAEDIC SURGERY

## 2020-07-27 PROCEDURE — 99999 PR PBB SHADOW E&M-EST. PATIENT-LVL IV: ICD-10-PCS | Mod: PBBFAC,,, | Performed by: ORTHOPAEDIC SURGERY

## 2020-07-27 PROCEDURE — 99999 PR PBB SHADOW E&M-EST. PATIENT-LVL IV: CPT | Mod: PBBFAC,,, | Performed by: ORTHOPAEDIC SURGERY

## 2020-07-27 NOTE — PROGRESS NOTES
Chief Complaint   Patient presents with    Left Shoulder - Pain       HPI: Cleopatra Lozada is a 79 y.o. female who presents today complaining of left shoulder pain   Was seen in there ER about 1 week ago for continued shoulder pain 2 weeks after a fall. Reduction performed under conscious sedation. Here for follow up   Came out of her sling for the first time today per ER instructions     This is the extent of the patient's complaints at this time.     Hand dominance: Right    Review of Systems   All other systems reviewed and are negative.        Review of patient's allergies indicates:   Allergen Reactions    Tetanus vaccines and toxoid Rash         Current Outpatient Medications:     HYDROcodone-acetaminophen (NORCO) 5-325 mg per tablet, Take 1 tablet by mouth every 6 (six) hours as needed., Disp: 12 tablet, Rfl: 0    aspirin (ECOTRIN) 81 MG EC tablet, Take 1 tablet (81 mg total) by mouth once daily., Disp: , Rfl: 0    atorvastatin (LIPITOR) 80 MG tablet, Take 1 tablet (80 mg total) by mouth once daily., Disp: 90 tablet, Rfl: 3    clopidogrel (PLAVIX) 75 mg tablet, Take 1 tablet (75 mg total) by mouth once daily., Disp: 30 tablet, Rfl: 11    losartan (COZAAR) 50 MG tablet, Take 1 tablet (50 mg total) by mouth once daily., Disp: 90 tablet, Rfl: 3    metoprolol succinate (TOPROL-XL) 25 MG 24 hr tablet, Take 1 tablet (25 mg total) by mouth once daily., Disp: 30 tablet, Rfl: 2    Past Medical History:   Diagnosis Date    Aneurysm     abdominal, repaired 11/15/2011    Cigarette smoker     patient reports buying loose tobacco and rolling with a machine, at home    Hypertension     TIA (transient ischemic attack)     patient states 3rd episode       Patient Active Problem List   Diagnosis    Abdominal aortic aneurysm    Acute CVA (cerebrovascular accident)    Essential hypertension    CKD (chronic kidney disease) stage 3, GFR 30-59 ml/min    Preop cardiovascular exam    H/O carotid endarterectomy        Past Surgical History:   Procedure Laterality Date    ABDOMINAL AORTIC ANEURYSM REPAIR  11/15/2011    CAROTID ENDARTERECTOMY Right 4/10/2019    Procedure: ENDARTERECTOMY-CAROTID, RIGHT;  Surgeon: Pietro Steward MD;  Location: [address];  Service: Vascular;  Laterality: Right;    CLOSED REDUCTION Left 2020    Procedure: CLOSED REDUCTION OF LEFT SHOULDHER;  Surgeon: Alina Surgeon;  Location: [facility];  Service: Anesthesiology;  Laterality: Left;    TONSILLECTOMY         Social History     Tobacco Use    Smoking status: Former Smoker     Packs/day: 0.50     Types: Cigarettes     Quit date: 3/11/2019     Years since quittin.3    Smokeless tobacco: Never Used    Tobacco comment: reports using loose tobacco & rolling herself, by machine   Substance Use Topics    Alcohol use: No    Drug use: No       Family History   Problem Relation Age of Onset    Heart disease Mother     Cancer Father        Physical Exam:   Vitals:    20 1238   BP: 112/84   Pulse: 75   Resp: 18   Temp: 97.6 °F (36.4 °C)   SpO2: 97%   Weight: 55 kg (121 lb 4.1 oz)   Height: 5' (1.524 m)   PainSc: 0-No pain       General: Weight: 55 kg (121 lb 4.1 oz) Body mass index is 23.68 kg/m².  Patient is alert, awake and oriented to time, place and person. Mood and affect are appropriate.  Patient does not appear to be in any distress, denies any constitutional symptoms and appears stated age.   HEENT: Pupils are equal and round, sclera are not injected. External examination of ears and nose reveals no abnormalities. Cranial nerves II-X are grossly intact  Skin: no rashes, abrasions or open wounds on the affected extremity   Resp: No respiratory distress or audible wheezing   CV: 2+  pulses, all extremities warm and well perfused   Left Shoulder    AROM: FE 90, AB 80 , ER 30     Range of motion is painful     Acromioclavicular joint is not tender      Kyra's negative  Drop arm negative  Belly press negative    Cuff  Strength     Right     Left   Supraspinatus        5/5    5/5  Infraspinatus     5/5    5/5  Subscapularis     5/5    5/5    Deltoid testing            5/5    5/5    Elbow examination demonstrates no tenderness to palpation and has normal range of motion.     ltsi C5-T1  + epl, io, fds, fdp   2+ RP      Imaging:  3 views of the left shoulder:  positive for degenerative changes of the AC joint. The humeral head is well centered on the AP and anterior translated but located on axillary views. No sclerosis or calcification at the rotator cuff insertion on the greater tuberosity. There is not significant degenerative change of the glenohumeral joint or posterior subluxation of the humeral head. No acute changes or fracture.      I personally reviewed and interpreted the patient's imaging obtained today in clinic     Assessment: 79 y.o. female with left glenohumeral dislocation s/p reduction     I explained my diagnostic impression and the reasoning behind it in detail, using layman's terms.  Models and/or pictures were used to help in the explanation.  We discussed non operative and operative treatment modalities     Plan:   - Instructed in AAROM   - NWB LUE  - Sling as needed   - Return to clinic in 4 weeks. Return sooner if symptoms worsen or fail to improve.    All questions were answered in detail. The patient is in full agreement with the treatment plan and will proceed accordingly.    This note was created by combination of typed  and M-Modal dictation. Transcription and phonetic errors may be present.  If there are any questions, please contact me.

## 2020-08-15 ENCOUNTER — ANESTHESIA EVENT (OUTPATIENT)
Dept: ANESTHESIOLOGY | Facility: HOSPITAL | Age: 80
End: 2020-08-15
Payer: MEDICARE

## 2020-08-15 ENCOUNTER — ANESTHESIA (OUTPATIENT)
Dept: ANESTHESIOLOGY | Facility: HOSPITAL | Age: 80
End: 2020-08-15
Payer: MEDICARE

## 2020-08-15 ENCOUNTER — HOSPITAL ENCOUNTER (EMERGENCY)
Facility: HOSPITAL | Age: 80
Discharge: HOME | End: 2020-08-15
Attending: EMERGENCY MEDICINE
Payer: MEDICARE

## 2020-08-15 VITALS
HEART RATE: 66 BPM | BODY MASS INDEX: 23.56 KG/M2 | TEMPERATURE: 98 F | WEIGHT: 120 LBS | OXYGEN SATURATION: 100 % | HEIGHT: 60 IN | DIASTOLIC BLOOD PRESSURE: 83 MMHG | SYSTOLIC BLOOD PRESSURE: 150 MMHG | RESPIRATION RATE: 14 BRPM

## 2020-08-15 DIAGNOSIS — S43.005A DISLOCATION, SHOULDER CLOSED, LEFT, INITIAL ENCOUNTER: Primary | ICD-10-CM

## 2020-08-15 PROCEDURE — 63600175 PHARM REV CODE 636 W HCPCS: Performed by: ANESTHESIOLOGY

## 2020-08-15 PROCEDURE — D9220A PRA ANESTHESIA: ICD-10-PCS | Mod: ,,, | Performed by: ANESTHESIOLOGY

## 2020-08-15 PROCEDURE — 99285 EMERGENCY DEPT VISIT HI MDM: CPT | Mod: 25

## 2020-08-15 PROCEDURE — 37000009 HC ANESTHESIA EA ADD 15 MINS

## 2020-08-15 PROCEDURE — 37000008 HC ANESTHESIA 1ST 15 MINUTES

## 2020-08-15 PROCEDURE — 23650 CLTX SHO DSLC W/MNPJ WO ANES: CPT | Mod: LT

## 2020-08-15 PROCEDURE — D9220A PRA ANESTHESIA: Mod: ,,, | Performed by: ANESTHESIOLOGY

## 2020-08-15 PROCEDURE — 25000003 PHARM REV CODE 250: Performed by: ANESTHESIOLOGY

## 2020-08-15 PROCEDURE — 25000003 PHARM REV CODE 250: Performed by: EMERGENCY MEDICINE

## 2020-08-15 RX ORDER — SODIUM CHLORIDE 9 MG/ML
1000 INJECTION, SOLUTION INTRAVENOUS
Status: COMPLETED | OUTPATIENT
Start: 2020-08-15 | End: 2020-08-15

## 2020-08-15 RX ORDER — LIDOCAINE HYDROCHLORIDE 10 MG/ML
INJECTION, SOLUTION INTRAVENOUS
Status: DISCONTINUED | OUTPATIENT
Start: 2020-08-15 | End: 2020-08-15

## 2020-08-15 RX ORDER — PROPOFOL 10 MG/ML
VIAL (ML) INTRAVENOUS
Status: DISCONTINUED | OUTPATIENT
Start: 2020-08-15 | End: 2020-08-15

## 2020-08-15 RX ADMIN — SODIUM CHLORIDE 1000 ML: 0.9 INJECTION, SOLUTION INTRAVENOUS at 10:08

## 2020-08-15 RX ADMIN — PROPOFOL 10 MG: 10 INJECTION, EMULSION INTRAVENOUS at 10:08

## 2020-08-15 RX ADMIN — LIDOCAINE HYDROCHLORIDE 2 ML: 10 INJECTION, SOLUTION INTRAVENOUS at 10:08

## 2020-08-15 RX ADMIN — PROPOFOL 40 MG: 10 INJECTION, EMULSION INTRAVENOUS at 10:08

## 2020-08-15 NOTE — DISCHARGE INSTRUCTIONS
Please follow-up with your orthopedist, or the orthopedist above this week.  Wear your sling and swath.  Continue your pain medicines as directed.  Return immediately if you get worse or if new problems develop.

## 2020-08-15 NOTE — ANESTHESIA PREPROCEDURE EVALUATION
08/15/2020  Cleopatra Lozada is a 79 y.o., female.  To undergo Procedure(s) (LRB):  CLOSED REDUCTION (Left)     METS > 4  NPO > 8    Past Medical History:  Past Medical History:   Diagnosis Date    Aneurysm     abdominal, repaired 11/15/2011    Cigarette smoker     patient reports buying loose tobacco and rolling with a machine, at home    Hypertension     TIA (transient ischemic attack)     patient states 3rd episode       Past Surgical History:  Past Surgical History:   Procedure Laterality Date    ABDOMINAL AORTIC ANEURYSM REPAIR  11/15/2011    CAROTID ENDARTERECTOMY Right 4/10/2019    Procedure: ENDARTERECTOMY-CAROTID, RIGHT;  Surgeon: Pietro Steward MD;  Location: [facility] OR [address] [place] [address];  Service: Vascular;  Laterality: Right;    CLOSED REDUCTION Left 2020    Procedure: CLOSED REDUCTION OF LEFT SHOULDHER;  Surgeon: Alina Surgeon;  Location: [facility];  Service: Anesthesiology;  Laterality: Left;    TONSILLECTOMY         Social History:  Social History     Socioeconomic History    Marital status: Single     Spouse name: Not on file    Number of children: Not on file    Years of education: Not on file    Highest education level: Not on file   Occupational History    Not on file   Social Needs    Financial resource strain: Not on file    Food insecurity     Worry: Not on file     Inability: Not on file    Transportation needs     Medical: Not on file     Non-medical: Not on file   Tobacco Use    Smoking status: Former Smoker     Packs/day: 0.50     Types: Cigarettes     Quit date: 3/11/2019     Years since quittin.4    Smokeless tobacco: Never Used    Tobacco comment: reports using loose tobacco & rolling herself, by machine   Substance and Sexual Activity    Alcohol use: No    Drug use: No    Sexual activity: Not on file   Lifestyle    Physical activity     Days per week: Not  on file     Minutes per session: Not on file    Stress: Not on file   Relationships    Social connections     Talks on phone: Not on file     Gets together: Not on file     Attends [employer] service: Not on file     Active member of club or organization: Not on file     Attends meetings of clubs or organizations: Not on file     Relationship status: Not on file   Other Topics Concern    Not on file   Social History Narrative    Not on file       Medications:  No current facility-administered medications on file prior to encounter.      Current Outpatient Medications on File Prior to Encounter   Medication Sig Dispense Refill    aspirin (ECOTRIN) 81 MG EC tablet Take 1 tablet (81 mg total) by mouth once daily.  0    atorvastatin (LIPITOR) 80 MG tablet Take 1 tablet (80 mg total) by mouth once daily. 90 tablet 3    clopidogrel (PLAVIX) 75 mg tablet Take 1 tablet (75 mg total) by mouth once daily. 30 tablet 11    HYDROcodone-acetaminophen (NORCO) 5-325 mg per tablet Take 1 tablet by mouth every 6 (six) hours as needed. 12 tablet 0    losartan (COZAAR) 50 MG tablet Take 1 tablet (50 mg total) by mouth once daily. 90 tablet 3    metoprolol succinate (TOPROL-XL) 25 MG 24 hr tablet Take 1 tablet (25 mg total) by mouth once daily. 30 tablet 2       Allergies:  Review of patient's allergies indicates:   Allergen Reactions    Tetanus vaccines and toxoid Rash       Active Problems:  Patient Active Problem List   Diagnosis    Abdominal aortic aneurysm    Acute CVA (cerebrovascular accident)    Essential hypertension    CKD (chronic kidney disease) stage 3, GFR 30-59 ml/min    Preop cardiovascular exam    H/O carotid endarterectomy     24 Hour Vitals:  Temp:  [36.5 °C (97.7 °F)] 36.5 °C (97.7 °F)  Pulse:  [] 95  Resp:  [18] 18  SpO2:  [96 %] 96 %  BP: (134-178)/(99) 178/99   See Nursing Charting For Additional Vitals    Anesthesia Evaluation    I have reviewed the Patient Summary Reports.    I have  reviewed the Nursing Notes.       Review of Systems  Anesthesia Hx:  No problems with previous Anesthesia   Denies Personal Hx of Anesthesia complications.   Social:  No Alcohol Use, Former Smoker    Cardiovascular:   Exercise tolerance: good Hypertension, well controlled    Pulmonary:  Pulmonary Normal    Hepatic/GI:  Hepatic/GI Normal    Neurological:   TIA,        Physical Exam  General:  Well nourished    Airway/Jaw/Neck:   MP3, TMD > 3FB, edentulous     Chest/Lungs:  Chest/Lungs Clear    Heart/Vascular:  Heart Findings: Normal            Anesthesia Plan  Type of Anesthesia, risks & benefits discussed:  Anesthesia Type:  general, MAC  Patient's Preference:   Intra-op Monitoring Plan: standard ASA monitors  Intra-op Monitoring Plan Comments:   Post Op Pain Control Plan:   Post Op Pain Control Plan Comments:   Induction:   IV  Beta Blocker:  Patient is on a Beta-Blocker and has received one dose within the past 24 hours (No further documentation required).       Informed Consent: Patient understands risks and agrees with Anesthesia plan.  Questions answered. Anesthesia consent signed with patient.  ASA Score: 2     Day of Surgery Review of History & Physical:  There are no significant changes.          Ready For Surgery From Anesthesia Perspective.

## 2020-08-15 NOTE — ANESTHESIA POSTPROCEDURE EVALUATION
Anesthesia Post Evaluation    Patient: Cleopatra Lozada    Procedure(s) Performed: Procedure(s) (LRB):  CLOSED REDUCTION (Left)    Final Anesthesia Type: MAC    Patient location during evaluation: ED  Patient participation: Yes- Able to Participate  Level of consciousness: awake and alert and oriented  Post-procedure vital signs: reviewed and stable  Pain management: adequate  Airway patency: patent    PONV status at discharge: No PONV  Anesthetic complications: no      Cardiovascular status: hemodynamically stable and blood pressure returned to baseline  Respiratory status: spontaneous ventilation, room air and unassisted  Hydration status: euvolemic  Follow-up not needed.          Vitals Value Taken Time   BP 96/72 08/15/20 1059   Temp 36.5 °C (97.7 °F) 08/15/20 0907   Pulse 76 08/15/20 1100   Resp 21 08/15/20 1100   SpO2 96 % 08/15/20 1100   Vitals shown include unvalidated device data.      No case tracking events are documented in the log.      Pain/Jie Score: No data recorded

## 2020-08-15 NOTE — ED TRIAGE NOTES
"Pt arrived to ED via personal transport with chief complaint of LEFT shoulder pain. Pt reports she lifted her arm while sleeping and "it must've come out the socket". Pt previously seen in our ED for left shoulder dislocation, closed reduction in ED. AAO x 4. Pt placed on automatic BP cuff, continuous pulse oxim ter, and continuous cardiac monitor.   "

## 2020-08-15 NOTE — ED PROVIDER NOTES
Encounter Date: 8/15/2020    SCRIBE #1 NOTE: Josue ARMENTA am scribing for, and in the presence of,  Gerardo Jay MD. I have scribed the following portions of the note - Other sections scribed: BARRERA BERNAL.       History     Chief Complaint   Patient presents with    Dislocation     dislocation of left shoulder.  lifted arm apporx 4 am today and it came back out.  was here for same on July 20th w/ closed reduction     79-year-old female with past medical history of Hypertension presenting with left shoulder pain that started this morning at 4 am. Patient reports she was laying down and lifted her shoulder over her head and felt it pop. Denies any falls or trauma. Patient reports she had closed reduction of left shoulder surgery in July. Patient follows up with orthopedics and has an appointment in two weeks. Attempted treatment with a pain killer this morning. She denies chest pain, shortness of breath, cough, nausea, vomiting, or any further complaints. Denies tobacco use or alcohol abuse.    The history is provided by the patient. No  was used.     Review of patient's allergies indicates:   Allergen Reactions    Tetanus vaccines and toxoid Rash     Past Medical History:   Diagnosis Date    Aneurysm     abdominal, repaired 11/15/2011    Cigarette smoker     patient reports buying loose tobacco and rolling with a machine, at home    Hypertension     TIA (transient ischemic attack)     patient states 3rd episode     Past Surgical History:   Procedure Laterality Date    ABDOMINAL AORTIC ANEURYSM REPAIR  11/15/2011    CAROTID ENDARTERECTOMY Right 4/10/2019    Procedure: ENDARTERECTOMY-CAROTID, RIGHT;  Surgeon: Pietro Steward MD;  Location: [address];  Service: Vascular;  Laterality: Right;    CLOSED REDUCTION Left 7/20/2020    Procedure: CLOSED REDUCTION OF LEFT SHOULDHER;  Surgeon: Alina Surgeon;  Location: [facility];  Service: Anesthesiology;  Laterality: Left;     TONSILLECTOMY       Family History   Problem Relation Age of Onset    Heart disease Mother     Cancer Father      Social History     Tobacco Use    Smoking status: Former Smoker     Packs/day: 0.50     Types: Cigarettes     Quit date: 3/11/2019     Years since quittin.4    Smokeless tobacco: Never Used    Tobacco comment: reports using loose tobacco & rolling herself, by machine   Substance Use Topics    Alcohol use: No    Drug use: No     Review of Systems   Constitutional: Negative for chills, diaphoresis and fever.   HENT: Negative for ear pain and sore throat.    Eyes: Negative for pain.   Respiratory: Negative for cough and shortness of breath.    Cardiovascular: Negative for chest pain.   Gastrointestinal: Negative for abdominal pain, diarrhea and vomiting.   Genitourinary: Negative for dysuria.   Musculoskeletal: Positive for arthralgias. Negative for back pain.   Skin: Negative for rash.   Neurological: Negative for headaches.       Physical Exam     Initial Vitals   BP Pulse Resp Temp SpO2   08/15/20 0907 08/15/20 0907 08/15/20 0907 08/15/20 0907 08/15/20 0923   (!) 134/99 (!) 118 18 97.7 °F (36.5 °C) 96 %      MAP       --                Physical Exam  The patient was examined specifically for the following:   General:No significant distress, Good color, Warm and dry. Head and neck:Scalp atraumatic, Neck supple. Neurological:Appropriate conversation, Gross motor deficits. Eyes:Conjugate gaze, Clear corneas. ENT: No epistaxis. Cardiac: Regular rate and rhythm, Grossly normal heart tones. Pulmonary: Wheezing, Rales. Gastrointestinal: Abdominal tenderness, Abdominal distention. Musculoskeletal: Extremity deformity, Apparent pain with range of motion of the joints. Skin: Rash.   The findings on examination were normal except for the following:  The patient has a dislocated left shoulder.  Distal neurovascular and tendon function are intact.  The lungs are clear.  The heart tones are normal.  The  abdomen is nontender.  ED Course   Orthopedic Injury    Date/Time: 8/15/2020 11:00 AM  Performed by: Gerardo Jay MD  Authorized by: Gerardo Jay MD     Location procedure was performed:  [facility] EMERGENCY DEPARTMENT  Injury:     Injury location:  Shoulder    Location details:  Left shoulder    Injury type:  Dislocation    Dislocation type: anterior      Chronicity:  Recurrent      Pre-procedure assessment:     Distal perfusion: normal      Neurological function: normal      Range of motion: reduced        Selections made in this section will also lock the Injury type section above.:     Immobilization:  Sling    Complications: No      Specimens: No      Implants: No    Post-procedure assessment:     Distal perfusion: normal      Neurological function: normal      Range of motion: normal        Labs Reviewed - No data to display       Imaging Results          X-Ray Shoulder 2 or More Views Left (Final result)  Result time 08/15/20 11:23:43    Final result by Ronnie Alcantara MD (08/15/20 11:23:43)                 Impression:      Status post reduction of previously identified anterior shoulder dislocation.  Irregularity of the posterolateral aspect of humeral head could indicate Hill Sachs type impaction fracture.      Electronically signed by: Ronnie Alcantara  Date:    08/15/2020  Time:    11:23             Narrative:    EXAMINATION:  XR SHOULDER COMPLETE 2 OR MORE VIEWS LEFT    CLINICAL HISTORY:  Post reduction;    TECHNIQUE:  Two or three views of the left shoulder were performed.    COMPARISON:  Same day age pre reduction radiographs performed 08/15/2020, 09:49 hours    FINDINGS:  Successful reduction of previously identified anterior shoulder dislocation.  Some degree of irregularity of the posterolateral aspect of the humeral head could indicate Hill-Sachs type impaction fracture.  Additional findings also are not substantially changed relative to earlier same day radiograph.                                X-Ray Shoulder 2 or More Views Left (Final result)  Result time 08/15/20 10:11:40    Final result by Hollis Conley DO (08/15/20 10:11:40)                 Impression:      1.  As above      Electronically signed by: Hollis Conley DO  Date:    08/15/2020  Time:    10:11             Narrative:    EXAMINATION:  XR SHOULDER COMPLETE 2 OR MORE VIEWS LEFT    CLINICAL HISTORY:  Left shoulder dislocation;    TECHNIQUE:  Two or three views of the left shoulder were preformed.    COMPARISON:  07/27/2020    FINDINGS:  An anterior shoulder dislocation is noted.  No definitive fractures are identified.  Mild AC joint arthropathy is present.                              Medical decision making:  This patient presents to the emergency room with a dislocated left shoulder.  The patient was sedated by Anesthesia.  The shoulder was reduced by emergency Medicine.  Neurovascular and tendon function were intact before and after the procedure.  I will discharge in sling and swath.  Post reduction x-rays reveal successful reduction without fracture.                Scribe Attestation:   Scribe #1: I performed the above scribed service and the documentation accurately describes the services I performed. I attest to the accuracy of the note.                          Clinical Impression:       ICD-10-CM ICD-9-CM   1. Dislocation, shoulder closed, left, initial encounter  S43.005A 831.00             ED Disposition Condition    Discharge Stable        ED Prescriptions     None        Follow-up Information     Follow up With Specialties Details Why Contact Info    Nafisa Rizvi MD Orthopedic Surgery In 3 days  [address] [facility] [address]  [phone]                       I personally performed the services described in this documentation.  All medical record  entries made by the scribe are at my direction and in my presence.  Signed, Dr. Misty Jay MD  08/15/20 1104       Gerardo Jay,  MD  08/15/20 1158       Gerardo Jay MD  08/23/20 0610

## 2020-08-15 NOTE — ED NOTES
Pt attached to cardiac monitor, pulse ox, and Bp cuff. Call light within reach     lAlen Napoles, Patient Care Assistant  08/15/20 [id number]

## 2020-11-26 ENCOUNTER — HOSPITAL ENCOUNTER (OUTPATIENT)
Facility: HOSPITAL | Age: 80
Discharge: HOME | End: 2020-11-27
Attending: EMERGENCY MEDICINE | Admitting: EMERGENCY MEDICINE
Payer: MEDICARE

## 2020-11-26 DIAGNOSIS — I47.10 SVT (SUPRAVENTRICULAR TACHYCARDIA): Primary | ICD-10-CM

## 2020-11-26 DIAGNOSIS — R79.89 TROPONIN I ABOVE REFERENCE RANGE: ICD-10-CM

## 2020-11-26 DIAGNOSIS — R07.9 CHEST PAIN: ICD-10-CM

## 2020-11-26 PROBLEM — E78.5 HYPERLIPIDEMIA: Status: ACTIVE | Noted: 2020-11-26

## 2020-11-26 PROBLEM — I35.0 AORTIC STENOSIS: Status: ACTIVE | Noted: 2020-11-26

## 2020-11-26 LAB
ALBUMIN SERPL BCP-MCNC: 3.7 G/DL (ref 3.5–5.2)
ALP SERPL-CCNC: 93 U/L (ref 55–135)
ALT SERPL W/O P-5'-P-CCNC: 9 U/L (ref 10–44)
ANION GAP SERPL CALC-SCNC: 11 MMOL/L (ref 8–16)
ANION GAP SERPL CALC-SCNC: 15 MMOL/L (ref 8–16)
AST SERPL-CCNC: 26 U/L (ref 10–40)
BASOPHILS # BLD AUTO: 0.01 K/UL (ref 0–0.2)
BASOPHILS NFR BLD: 0.1 % (ref 0–1.9)
BILIRUB SERPL-MCNC: 0.6 MG/DL (ref 0.1–1)
BUN SERPL-MCNC: 16 MG/DL (ref 8–23)
BUN SERPL-MCNC: 19 MG/DL (ref 6–30)
CALCIUM SERPL-MCNC: 8.5 MG/DL (ref 8.7–10.5)
CHLORIDE SERPL-SCNC: 105 MMOL/L (ref 95–110)
CHLORIDE SERPL-SCNC: 107 MMOL/L (ref 95–110)
CO2 SERPL-SCNC: 24 MMOL/L (ref 23–29)
CREAT SERPL-MCNC: 1.2 MG/DL (ref 0.5–1.4)
CREAT SERPL-MCNC: 1.3 MG/DL (ref 0.5–1.4)
CTP QC/QA: YES
DIFFERENTIAL METHOD: ABNORMAL
EOSINOPHIL # BLD AUTO: 0.1 K/UL (ref 0–0.5)
EOSINOPHIL NFR BLD: 0.7 % (ref 0–8)
ERYTHROCYTE [DISTWIDTH] IN BLOOD BY AUTOMATED COUNT: 15.1 % (ref 11.5–14.5)
EST. GFR  (AFRICAN AMERICAN): 49 ML/MIN/1.73 M^2
EST. GFR  (NON AFRICAN AMERICAN): 42 ML/MIN/1.73 M^2
GLUCOSE SERPL-MCNC: 104 MG/DL (ref 70–110)
GLUCOSE SERPL-MCNC: 98 MG/DL (ref 70–110)
HCT VFR BLD AUTO: 35.9 % (ref 37–48.5)
HCT VFR BLD CALC: 37 %PCV (ref 36–54)
HGB BLD-MCNC: 11.3 G/DL (ref 12–16)
IMM GRANULOCYTES # BLD AUTO: 0.02 K/UL (ref 0–0.04)
IMM GRANULOCYTES NFR BLD AUTO: 0.3 % (ref 0–0.5)
LYMPHOCYTES # BLD AUTO: 0.9 K/UL (ref 1–4.8)
LYMPHOCYTES NFR BLD: 12.4 % (ref 18–48)
MCH RBC QN AUTO: 27.3 PG (ref 27–31)
MCHC RBC AUTO-ENTMCNC: 31.5 G/DL (ref 32–36)
MCV RBC AUTO: 87 FL (ref 82–98)
MONOCYTES # BLD AUTO: 0.4 K/UL (ref 0.3–1)
MONOCYTES NFR BLD: 6.3 % (ref 4–15)
NEUTROPHILS # BLD AUTO: 5.6 K/UL (ref 1.8–7.7)
NEUTROPHILS NFR BLD: 80.2 % (ref 38–73)
NRBC BLD-RTO: 0 /100 WBC
PLATELET # BLD AUTO: 223 K/UL (ref 150–350)
PMV BLD AUTO: 10.3 FL (ref 9.2–12.9)
POC IONIZED CALCIUM: 1.13 MMOL/L (ref 1.06–1.42)
POC TCO2 (MEASURED): 26 MMOL/L (ref 23–29)
POTASSIUM BLD-SCNC: 3.8 MMOL/L (ref 3.5–5.1)
POTASSIUM SERPL-SCNC: 3.7 MMOL/L (ref 3.5–5.1)
PROT SERPL-MCNC: 7.3 G/DL (ref 6–8.4)
RBC # BLD AUTO: 4.14 M/UL (ref 4–5.4)
SAMPLE: NORMAL
SARS-COV-2 RDRP RESP QL NAA+PROBE: NEGATIVE
SODIUM BLD-SCNC: 140 MMOL/L (ref 136–145)
SODIUM SERPL-SCNC: 142 MMOL/L (ref 136–145)
TROPONIN I SERPL DL<=0.01 NG/ML-MCNC: 1.22 NG/ML (ref 0–0.03)
TROPONIN I SERPL DL<=0.01 NG/ML-MCNC: 1.3 NG/ML (ref 0–0.03)
TROPONIN I SERPL DL<=0.01 NG/ML-MCNC: 1.38 NG/ML (ref 0–0.03)
WBC # BLD AUTO: 7.01 K/UL (ref 3.9–12.7)

## 2020-11-26 PROCEDURE — 93010 ELECTROCARDIOGRAM REPORT: CPT | Mod: ,,, | Performed by: INTERNAL MEDICINE

## 2020-11-26 PROCEDURE — 99284 PR EMERGENCY DEPT VISIT,LEVEL IV: ICD-10-PCS | Mod: ,,, | Performed by: INTERNAL MEDICINE

## 2020-11-26 PROCEDURE — 82330 ASSAY OF CALCIUM: CPT

## 2020-11-26 PROCEDURE — 99284 EMERGENCY DEPT VISIT MOD MDM: CPT | Mod: ,,, | Performed by: INTERNAL MEDICINE

## 2020-11-26 PROCEDURE — 63600175 PHARM REV CODE 636 W HCPCS: Performed by: EMERGENCY MEDICINE

## 2020-11-26 PROCEDURE — 99291 CRITICAL CARE FIRST HOUR: CPT | Mod: 25

## 2020-11-26 PROCEDURE — 82565 ASSAY OF CREATININE: CPT | Mod: 91

## 2020-11-26 PROCEDURE — 23650 CLTX SHO DSLC W/MNPJ WO ANES: CPT | Mod: LT

## 2020-11-26 PROCEDURE — 84295 ASSAY OF SERUM SODIUM: CPT

## 2020-11-26 PROCEDURE — 84484 ASSAY OF TROPONIN QUANT: CPT | Mod: 91

## 2020-11-26 PROCEDURE — 84132 ASSAY OF SERUM POTASSIUM: CPT

## 2020-11-26 PROCEDURE — 80053 COMPREHEN METABOLIC PANEL: CPT

## 2020-11-26 PROCEDURE — 93010 EKG 12-LEAD: ICD-10-PCS | Mod: ,,, | Performed by: INTERNAL MEDICINE

## 2020-11-26 PROCEDURE — 36415 COLL VENOUS BLD VENIPUNCTURE: CPT

## 2020-11-26 PROCEDURE — U0002 COVID-19 LAB TEST NON-CDC: HCPCS | Performed by: EMERGENCY MEDICINE

## 2020-11-26 PROCEDURE — 25000003 PHARM REV CODE 250: Performed by: PHYSICIAN ASSISTANT

## 2020-11-26 PROCEDURE — 93005 ELECTROCARDIOGRAM TRACING: CPT

## 2020-11-26 PROCEDURE — G0378 HOSPITAL OBSERVATION PER HR: HCPCS

## 2020-11-26 PROCEDURE — 99900035 HC TECH TIME PER 15 MIN (STAT)

## 2020-11-26 PROCEDURE — 85014 HEMATOCRIT: CPT

## 2020-11-26 PROCEDURE — 85025 COMPLETE CBC W/AUTO DIFF WBC: CPT

## 2020-11-26 PROCEDURE — 83036 HEMOGLOBIN GLYCOSYLATED A1C: CPT

## 2020-11-26 RX ORDER — METOPROLOL SUCCINATE 25 MG/1
25 TABLET, EXTENDED RELEASE ORAL DAILY
Status: DISCONTINUED | OUTPATIENT
Start: 2020-11-26 | End: 2020-11-27 | Stop reason: HOSPADM

## 2020-11-26 RX ORDER — ATORVASTATIN CALCIUM 40 MG/1
80 TABLET, FILM COATED ORAL DAILY
Status: DISCONTINUED | OUTPATIENT
Start: 2020-11-26 | End: 2020-11-27 | Stop reason: HOSPADM

## 2020-11-26 RX ORDER — ACETAMINOPHEN 500 MG
500 TABLET ORAL EVERY 6 HOURS PRN
Status: DISCONTINUED | OUTPATIENT
Start: 2020-11-26 | End: 2020-11-27 | Stop reason: HOSPADM

## 2020-11-26 RX ORDER — IBUPROFEN 200 MG
16 TABLET ORAL
Status: DISCONTINUED | OUTPATIENT
Start: 2020-11-26 | End: 2020-11-27 | Stop reason: HOSPADM

## 2020-11-26 RX ORDER — CLOPIDOGREL BISULFATE 75 MG/1
75 TABLET ORAL DAILY
Status: DISCONTINUED | OUTPATIENT
Start: 2020-11-26 | End: 2020-11-27 | Stop reason: HOSPADM

## 2020-11-26 RX ORDER — LOSARTAN POTASSIUM 25 MG/1
50 TABLET ORAL DAILY
Status: DISCONTINUED | OUTPATIENT
Start: 2020-11-26 | End: 2020-11-27 | Stop reason: HOSPADM

## 2020-11-26 RX ORDER — GLUCAGON 1 MG
1 KIT INJECTION
Status: DISCONTINUED | OUTPATIENT
Start: 2020-11-26 | End: 2020-11-27 | Stop reason: HOSPADM

## 2020-11-26 RX ORDER — CLONIDINE HYDROCHLORIDE 0.1 MG/1
0.1 TABLET ORAL EVERY 8 HOURS PRN
Status: DISCONTINUED | OUTPATIENT
Start: 2020-11-26 | End: 2020-11-27 | Stop reason: HOSPADM

## 2020-11-26 RX ORDER — ASPIRIN 81 MG/1
81 TABLET ORAL DAILY
Status: DISCONTINUED | OUTPATIENT
Start: 2020-11-26 | End: 2020-11-27 | Stop reason: HOSPADM

## 2020-11-26 RX ORDER — AMOXICILLIN 250 MG
1 CAPSULE ORAL DAILY PRN
Status: DISCONTINUED | OUTPATIENT
Start: 2020-11-26 | End: 2020-11-27 | Stop reason: HOSPADM

## 2020-11-26 RX ORDER — FENTANYL CITRATE 50 UG/ML
50 INJECTION, SOLUTION INTRAMUSCULAR; INTRAVENOUS
Status: COMPLETED | OUTPATIENT
Start: 2020-11-26 | End: 2020-11-26

## 2020-11-26 RX ORDER — SODIUM CHLORIDE 0.9 % (FLUSH) 0.9 %
10 SYRINGE (ML) INJECTION
Status: DISCONTINUED | OUTPATIENT
Start: 2020-11-26 | End: 2020-11-27 | Stop reason: HOSPADM

## 2020-11-26 RX ORDER — PROPOFOL 10 MG/ML
50 VIAL (ML) INTRAVENOUS ONCE
Status: COMPLETED | OUTPATIENT
Start: 2020-11-26 | End: 2020-11-26

## 2020-11-26 RX ORDER — IBUPROFEN 200 MG
24 TABLET ORAL
Status: DISCONTINUED | OUTPATIENT
Start: 2020-11-26 | End: 2020-11-27 | Stop reason: HOSPADM

## 2020-11-26 RX ADMIN — LOSARTAN POTASSIUM 50 MG: 25 TABLET, FILM COATED ORAL at 03:11

## 2020-11-26 RX ADMIN — ATORVASTATIN CALCIUM 80 MG: 40 TABLET, FILM COATED ORAL at 03:11

## 2020-11-26 RX ADMIN — ASPIRIN 81 MG: 81 TABLET, COATED ORAL at 03:11

## 2020-11-26 RX ADMIN — PROPOFOL 40 MG: 10 INJECTION, EMULSION INTRAVENOUS at 10:11

## 2020-11-26 RX ADMIN — FENTANYL CITRATE 50 MCG: 50 INJECTION INTRAMUSCULAR; INTRAVENOUS at 09:11

## 2020-11-26 RX ADMIN — CLOPIDOGREL 75 MG: 75 TABLET, FILM COATED ORAL at 03:11

## 2020-11-26 RX ADMIN — METOPROLOL SUCCINATE 25 MG: 25 TABLET, EXTENDED RELEASE ORAL at 03:11

## 2020-11-26 NOTE — ED NOTES
at bedside discussing procedure with pt.  While discussing procedure pt had approximately 10-12 beat run of v-tach.  Procedure held to evaluate pt.  Will collect labs and ekg per md.

## 2020-11-26 NOTE — HPI
Cleopatra Lozada is a 80 y.o. female who presents to the ED with a dislocated left shoulder. Patient reports she lifted her arm and dislocated her left shoulder 2 hours prior to arrival.  Prior to setting shoulder she was noted to have an arrhythmia on telemetry.  No history of coronary artery disease.  Ischemic evaluation last year.  Nuclear myocardial perfusion study was negative for ischemia.  Echocardiogram showed normal left ventricular systolic function.  The patient states that she has episodes where she feels palpitations that can last upwards of 30 min at a time.  Review of the telemetry strip shows what I think is SVT with aberrancy.  Not ventricular tachycardia.  Troponin elevated.  Baseline EKG shows no ischemic changes.  Cardiovascular risk factors hypertension, hyperlipidemia.  Blood pressure significantly elevated on presentation.    Nuclear myocardial perfusion study 04/03/2019:    · Normal myocardial perfusion scan  · Arrhythmias during stress: PACs.ISOLATED PACs  · There was no ST segment deviation noted during stress.  · The patient reported light headedness and abdominal pain during the stress test.  · The perfusion scan is free of evidence from myocardial ischemia or injury.  · LVEF post-stress is 78%  · The EKG portion of this study is negative for myocardial ischemia.    Echocardiogram 04/03/2019:    · Normal left ventricular systolic function. The estimated ejection fraction is 55%  · Concentric left ventricular remodeling.  · Mild aortic valve stenosis.  · Aortic valve area is 1.71 cm2; peak velocity is 2.33 m/s; mean gradient is 11.75 mmHg.

## 2020-11-26 NOTE — ASSESSMENT & PLAN NOTE
Troponin of 1.3 with SVT seen in ED. Denies any chest pain/SOB. EKG with ST segment elevation  -continue home aspirin/statin/plavix  -cardiology consulted  -echo  -troponin trend  -telemetry  -TSH/A1c/lipid/mg pending

## 2020-11-26 NOTE — ED PROVIDER NOTES
Encounter Date: 11/26/2020    SCRIBE #1 NOTE: I, Shawanda David, am scribing for, and in the presence of,  Vernon Fabian MD. I have scribed the following portions of the note - Other sections scribed: HPI, ROS, PE.       History     Chief Complaint   Patient presents with    Shoulder Injury     Pt reports she was reaching to get something and dislocated to LEFT shoulder this AM. Pain is 10/10     Cleopatra Lozada is a 80 y.o. female, with a PMHx of Hypertension and dislocated shoulder, who presents to the ED with a dislocated left shoulder. Patient reports she lifted her arm and dislocated her left shoulder 2 hours prior to arrival. She currently reports shoulder pain. Patient notes this is her third left shoulder dislocation, and the last dislocation happened 3 months ago. She further notes being sedated for her past 2 closed reductions. No other associated symptoms. Patient is on blood thinners. She has a drug allergy to Tetanus.     The history is provided by the patient.     Review of patient's allergies indicates:   Allergen Reactions    Tetanus vaccines and toxoid Rash     Past Medical History:   Diagnosis Date    Aneurysm     abdominal, repaired 11/15/2011    Cigarette smoker     patient reports buying loose tobacco and rolling with a machine, at home    Hypertension     TIA (transient ischemic attack)     patient states 3rd episode     Past Surgical History:   Procedure Laterality Date    ABDOMINAL AORTIC ANEURYSM REPAIR  11/15/2011    CAROTID ENDARTERECTOMY Right 4/10/2019    Procedure: ENDARTERECTOMY-CAROTID, RIGHT;  Surgeon: Pietro Steward MD;  Location: [facility] OR [address] [place] [address];  Service: Vascular;  Laterality: Right;    CLOSED REDUCTION Left 7/20/2020    Procedure: CLOSED REDUCTION OF LEFT SHOULDHER;  Surgeon: Alina Surgeon;  Location: [facility];  Service: Anesthesiology;  Laterality: Left;    CLOSED REDUCTION Left 8/15/2020    Procedure: CLOSED REDUCTION;  Surgeon: Alina Surgeon;  Location: [facility];  Service: Anesthesiology;  Laterality: Left;    TONSILLECTOMY       Family History   Problem Relation Age of Onset    Heart disease Mother     Cancer Father      Social History     Tobacco Use    Smoking status: Former Smoker     Packs/day: 0.50     Types: Cigarettes     Quit date: 3/11/2019     Years since quittin.7    Smokeless tobacco: Never Used    Tobacco comment: reports using loose tobacco & rolling herself, by machine   Substance Use Topics    Alcohol use: No    Drug use: No     Review of Systems   Constitutional: Negative for chills and fever.   HENT: Negative for congestion, rhinorrhea and sore throat.    Eyes: Negative for visual disturbance.   Respiratory: Negative for cough and shortness of breath.    Cardiovascular: Negative for chest pain.   Gastrointestinal: Negative for abdominal pain, diarrhea, nausea and vomiting.   Genitourinary: Negative for dysuria, frequency and hematuria.   Musculoskeletal: Negative for back pain.        (+) Left shoulder dislocation.   (+) Left shoulder pain.    Skin: Negative for rash.   Neurological: Negative for dizziness, weakness and headaches.       Physical Exam     Initial Vitals [20 0911]   BP Pulse Resp Temp SpO2   (!) 193/102 93 18 97.9 °F (36.6 °C) 97 %      MAP       --         Physical Exam    Vitals reviewed.  Constitutional: She appears well-developed and well-nourished. She is not diaphoretic. No distress.   HENT:   Head: Normocephalic and atraumatic.   Eyes: Conjunctivae and EOM are normal. Pupils are equal, round, and reactive to light. No scleral icterus.   Neck: Normal range of motion. Neck supple.   Cardiovascular: Normal rate, regular rhythm, normal heart sounds and intact distal pulses.   No murmur heard.  Pulmonary/Chest: Breath sounds normal. No respiratory distress. She has no wheezes. She has no rhonchi. She has no rales. She exhibits no tenderness.   Abdominal: Soft. Bowel sounds are normal. She exhibits no distension.  There is no abdominal tenderness. There is no rebound and no guarding.   Musculoskeletal: Tenderness present.      Comments: Tenderness and deformity in the left shoulder.    Neurological: She is alert and oriented to person, place, and time. She has normal strength. No cranial nerve deficit. GCS score is 15. GCS eye subscore is 4. GCS verbal subscore is 5. GCS motor subscore is 6.   Skin: Skin is warm and dry. No rash noted.   Psychiatric: She has a normal mood and affect. Her behavior is normal.         ED Course   Orthopedic Injury    Date/Time: 11/26/2020 5:43 PM  Performed by: Ankur Everett PA-C  Authorized by: Vernon Fabian MD     Location procedure was performed:  [facility] EMERGENCY DEPARTMENT  Consent Done?:  Yes  Universal Protocol:     Verbal consent obtained?: Yes      Written consent obtained?: Yes      Risks and benefits: Risks, benefits and alternatives were discussed      Consent given by:  Patient    Patient states understanding of procedure being performed: Yes      Patient's understanding of procedure matches consent: Yes      Procedure consent matches procedure scheduled: Yes      Relevant documents present and verified: Yes      Test results available and properly labeled: Yes      Site marked: Yes      Imaging studies available: Yes      Required items: Required blood products, implants, devices and special equipment avialable      Patient identity confirmed:  MRN, name and verbally with patient    Time Out: Immediately prior to the procedure a time out was called    Injury:     Injury location:  Shoulder    Location details:  Left shoulder    Injury type:  Dislocation    Dislocation type: anterior      Chronicity:  Recurrent      Pre-procedure assessment:     Neurovascular status: Neurovascularly intact      Distal perfusion: normal      Neurological function: normal      Range of motion: normal      Local anesthesia used?: No        Selections made in this section will also lock the  "Injury type section above.:     Manipulation performed?: Yes      Reduction method:  Traction and counter traction    Reduction method:  Traction and counter traction    Reduction method:  Traction and counter traction    Reduction method:  Traction and counter traction    Reduction method:  Traction and counter traction    Reduction method:  Traction and counter traction    Reduction successful?: Yes      Confirmation: Reduction confirmed by x-ray      Immobilization:  Sling    Complications: No    Post-procedure assessment:     Neurovascular status: Neurovascularly intact      Distal perfusion: normal      Neurological function: normal      Range of motion: normal      Patient tolerance:  Patient tolerated the procedure well with no immediate complications  Procedural Sedation        Date/Time: 11/26/2020 5:46 PM  Performed by: Vernon Fabian MD  Authorized by: Josi Kelly MD   Consent Done: Yes  Consent: Verbal consent obtained. Written consent obtained.  Risks and benefits: risks, benefits and alternatives were discussed  Consent given by: patient  Patient understanding: patient states understanding of the procedure being performed  Patient consent: the patient's understanding of the procedure matches consent given  Procedure consent: procedure consent matches procedure scheduled  Relevant documents: relevant documents present and verified  Test results: test results available and properly labeled  Site marked: the operative site was marked  Imaging studies: imaging studies available  Patient identity confirmed: name, verbally with patient and provided demographic data  Time out: Immediately prior to procedure a "time out" was called to verify the correct patient, procedure, equipment, support staff and site/side marked as required.  ASA Class: Class 2 - Mild Illness without functional impairment.  Mallampati Score: Class 1 - Visualization of the soft palate, fauces, uvula, and anterior/posterior " javier.   NPO STATUS:  Date/Time of last solid: 11/25/2020 11:00 PM  Equipment: on cardiac monitor., on BP monitor., on CO2 monitor., on supplemental oxygen., suction available. and airway equipment available.   Sedation type: moderate (conscious) sedation  (See MAR for exact dosages of medications).  Sedatives: propofol  Analgesia: fentanyl  Complications: No complications.   Patient/Family history of anesthesia or sedation complications: No  Critical Care    Date/Time: 11/26/2020 6:10 PM  Performed by: Vernon Fabian MD  Authorized by: Josi Kelly MD   Direct patient critical care time: 30 minutes  Total critical care time (exclusive of procedural time) : 30 minutes  Critical care was necessary to treat or prevent imminent or life-threatening deterioration of the following conditions: cardiac failure.        Labs Reviewed   CBC W/ AUTO DIFFERENTIAL - Abnormal; Notable for the following components:       Result Value    Hemoglobin 11.3 (*)     Hematocrit 35.9 (*)     MCHC 31.5 (*)     RDW 15.1 (*)     Lymph # 0.9 (*)     Gran % 80.2 (*)     Lymph % 12.4 (*)     All other components within normal limits   TROPONIN I - Abnormal; Notable for the following components:    Troponin I 1.382 (*)     All other components within normal limits   COMPREHENSIVE METABOLIC PANEL - Abnormal; Notable for the following components:    Calcium 8.5 (*)     ALT 9 (*)     eGFR if  49 (*)     eGFR if non  42 (*)     All other components within normal limits   HEMOGLOBIN A1C   SARS-COV-2 RDRP GENE   ISTAT PROCEDURE   ISTAT CHEM8     EKG Readings: (Independently Interpreted)   Initial Reading: No STEMI. Rhythm: Normal Sinus Rhythm. Heart Rate: 86.   No ST segment elevation or depression concerning for acute ischemia.        Imaging Results          X-Ray Shoulder 1 View Left (Final result)  Result time 11/26/20 12:42:45   Procedure changed from X-Ray Shoulder 2 or More Views Left     Final result  by Felice Thornton MD (11/26/20 12:42:45)                 Impression:      As above      Electronically signed by: Felice Thornton MD  Date:    11/26/2020  Time:    12:42             Narrative:    EXAMINATION:  XR SHOULDER 1 VIEW LEFT    CLINICAL HISTORY:  Please include axillary view;    COMPARISON:  11/26/2020    FINDINGS:  Single axillary view left shoulder.    The left humeral head appears to be appropriately positioned in relationship to the glenoid.  No acute displaced fracture.                               X-Ray Shoulder 2 or More Views Left (Final result)  Result time 11/26/20 11:53:53    Final result by Vane Brown MD (11/26/20 11:53:53)                 Impression:      See above.      Electronically signed by: Vane Brown MD  Date:    11/26/2020  Time:    11:53             Narrative:    EXAMINATION:  XR SHOULDER COMPLETE 2 OR MORE VIEWS LEFT    CLINICAL HISTORY:  shoulder dislocation;    TECHNIQUE:  Two or three views of the left shoulder were performed.    COMPARISON:  Prior from same day at 09:31    FINDINGS:  There is no longer evidence of anterior shoulder dislocation.  However the humeral head appears somewhat high riding and transscapular view is limited, appropriate glenohumeral alignment cannot be confirmed.  Axillary view is recommended for further evaluation.                               X-Ray Shoulder 2 or More Views Left (Final result)  Result time 11/26/20 10:08:24    Final result by Vane Brown MD (11/26/20 10:08:24)                 Impression:      Anterior shoulder dislocation.      Electronically signed by: Vane Brown MD  Date:    11/26/2020  Time:    10:08             Narrative:    EXAMINATION:  XR SHOULDER COMPLETE 2 OR MORE VIEWS LEFT    CLINICAL HISTORY:  left shoulder pain;    TECHNIQUE:  Two or three views of the left shoulder were performed.    COMPARISON:  Prior dated 08/15/2020    FINDINGS:  There is anterior dislocation of the humeral head with  respect to the glenoid.  There is diffuse osteopenia.  There is mild degenerative change of the acromioclavicular joint.  No acute fracture is identified.                                 Medical Decision Making:   History:   Old Medical Records: I decided to obtain old medical records.  Old Records Summarized: other records.       <> Summary of Records: Patient was seen in this ED for left shoulder dislocation on 8/15/2020 by Dr. Misty MD. Patient had closed reduction on 7/20/2020 and 8/15/2020, performed by Dr. Pena.   Initial Assessment:   80-year-old female presenting with left shoulder pain.  Patient reports mechanical dislocations shoulder, 3rd time this has occurred.  Reports she reached for something over her head and had sudden onset pain and immobility.  On exam patient is uncomfortable appearing.  She has mild hypertension, appropriate for pain.  Analgesia given with fentanyl.  Patient has no other complaints.  X-ray shows no fracture.  As the patient was being consented for procedural sedation, she was noted to go have a run of wide complex tachycardia concerning for V-tach.  She received no medication other than fentanyl 1 hr previously and no procedures have been performed at that time.  She reports she has had similar symptoms at home that sometimes last for up to 30 min and are associated with chest discomfort and shortness of breath but no syncope.  Cardiology was consulted who evaluated the patient, medical record, and EKG as well as rhythm strip.  She has a normal stress test and normal echo approximately 1 year ago.  Arrhythmia felt to be more consistent with SVT with aberrancy.  Labs were drawn and sent including Chem 8 which showed no significant metabolic abnormalities.  Procedural sedation and reduction was performed without incident. Ankur Everett performed reduction while I provided anesthesia.  The patient did not have any significant desaturation events or apneic events.  See nursing  note for medication administration.  Afterwards, the patient's troponin resulted with significant elevation.  Given this, the patient is admitted to Hospital Medicine for NSTEMI and further cardiac management.  Clinical Tests:   Radiological Study: Ordered and Reviewed            Scribe Attestation:   Scribe #1: I performed the above scribed service and the documentation accurately describes the services I performed. I attest to the accuracy of the note.                      Clinical Impression:     ICD-10-CM ICD-9-CM   1. Chest pain  R07.9 786.50   2. Troponin I above reference range  R77.8 790.6                      Disposition:   Disposition: Placed in Observation  Condition: Stable     ED Disposition Condition    Observation                  I, Vernon Fabian, personally performed the services described in this documentation. All medical record entries made by the scribe were at my direction and in my presence. I have reviewed the chart and agree that the record reflects my personal performance and is accurate and complete.           Vernon Fabian MD  11/26/20 1174

## 2020-11-26 NOTE — ASSESSMENT & PLAN NOTE
Hypertension.  SVT.  Trend.  Baseline EKG nonischemic.  Patient denies symptoms.  No chest pain or shortness of breath.  Check echocardiogram.  She is on aspirin and Plavix at home.  Continue her statin.  Continue her home blood pressure regimen.

## 2020-11-26 NOTE — ED TRIAGE NOTES
The patient presents to the ED via personal transportation alone. The patient reports that she was lifting up her left arm this morning and she felt it pop out of place. Patient states that this is the third time that this has happened and she had to come to the ED the other two times to have it put back into place under sedation.

## 2020-11-26 NOTE — ASSESSMENT & PLAN NOTE
Episode emergency room appears to be SVT with aberrancy.  Triggered by PVC.  Do not feel it is ventricular tachycardia.  Titrate beta-blocker as tolerated.

## 2020-11-26 NOTE — PLAN OF CARE
Problem: Fall Injury Risk  Goal: Absence of Fall and Fall-Related Injury  Outcome: Ongoing, Progressing  Intervention: Identify and Manage Contributors to Fall Injury Risk  Flowsheets (Taken 11/26/2020 7622)  Self-Care Promotion:   independence encouraged   meal setup provided   safe use of adaptive equipment encouraged   BADL personal objects within reach  Medication Review/Management: medications reviewed

## 2020-11-26 NOTE — SUBJECTIVE & OBJECTIVE
Past Medical History:   Diagnosis Date    Aneurysm     abdominal, repaired 11/15/2011    Cigarette smoker     patient reports buying loose tobacco and rolling with a machine, at home    Hypertension     TIA (transient ischemic attack)     patient states 3rd episode       Past Surgical History:   Procedure Laterality Date    ABDOMINAL AORTIC ANEURYSM REPAIR  11/15/2011    CAROTID ENDARTERECTOMY Right 4/10/2019    Procedure: ENDARTERECTOMY-CAROTID, RIGHT;  Surgeon: Pietro Steward MD;  Location: [address];  Service: Vascular;  Laterality: Right;    CLOSED REDUCTION Left 7/20/2020    Procedure: CLOSED REDUCTION OF LEFT SHOULDHER;  Surgeon: Alina Surgeon;  Location: [facility];  Service: Anesthesiology;  Laterality: Left;    CLOSED REDUCTION Left 8/15/2020    Procedure: CLOSED REDUCTION;  Surgeon: Alina Surgeon;  Location: [facility];  Service: Anesthesiology;  Laterality: Left;    TONSILLECTOMY         Review of patient's allergies indicates:   Allergen Reactions    Tetanus vaccines and toxoid Rash       No current facility-administered medications on file prior to encounter.      Current Outpatient Medications on File Prior to Encounter   Medication Sig    aspirin (ECOTRIN) 81 MG EC tablet Take 1 tablet (81 mg total) by mouth once daily.    atorvastatin (LIPITOR) 80 MG tablet Take 1 tablet (80 mg total) by mouth once daily.    clopidogrel (PLAVIX) 75 mg tablet Take 1 tablet (75 mg total) by mouth once daily.    losartan (COZAAR) 50 MG tablet Take 1 tablet (50 mg total) by mouth once daily.    metoprolol succinate (TOPROL-XL) 25 MG 24 hr tablet Take 1 tablet (25 mg total) by mouth once daily.    HYDROcodone-acetaminophen (NORCO) 5-325 mg per tablet Take 1 tablet by mouth every 6 (six) hours as needed.     Family History     Problem Relation (Age of Onset)    Cancer Father    Heart disease Mother        Tobacco Use    Smoking status: Former Smoker     Packs/day: 0.50     Types: Cigarettes      Quit date: 3/11/2019     Years since quittin.7    Smokeless tobacco: Never Used    Tobacco comment: reports using loose tobacco & rolling herself, by machine   Substance and Sexual Activity    Alcohol use: No    Drug use: No    Sexual activity: Not on file     Review of Systems   Cardiovascular: Positive for irregular heartbeat. Negative for chest pain, dyspnea on exertion, leg swelling, near-syncope, orthopnea, palpitations, paroxysmal nocturnal dyspnea and syncope.   Respiratory: Negative for shortness of breath, sputum production and wheezing.    Musculoskeletal: Positive for joint pain.     Objective:     Vital Signs (Most Recent):  Temp: 97.9 °F (36.6 °C) (20 0911)  Pulse: 81 (20 1201)  Resp: (!) 21 (20 1201)  BP: (!) 196/80 (20 1201)  SpO2: 96 % (20 120) Vital Signs (24h Range):  Temp:  [97.9 °F (36.6 °C)] 97.9 °F (36.6 °C)  Pulse:  [73-93] 81  Resp:  [9-32] 21  SpO2:  [94 %-100 %] 96 %  BP: (135-204)/() 196/80     Weight: 54.4 kg (120 lb)  Body mass index is 23.44 kg/m².    SpO2: 96 %  O2 Device (Oxygen Therapy): nasal cannula    No intake or output data in the 24 hours ending 20 1204    Lines/Drains/Airways     Peripheral Intravenous Line                 Peripheral IV - Single Lumen 08/15/20 0945 20 G Right Antecubital 103 days         Peripheral IV - Single Lumen 20 0940 20 G Right Antecubital less than 1 day                Physical Exam   Constitutional: She is oriented to person, place, and time. No distress.   Neck: No JVD present. Carotid bruit is not present.   Cardiovascular: Normal rate, regular rhythm and normal pulses.   Murmur heard.   Harsh midsystolic murmur is present with a grade of 3/6 at the upper right sternal border radiating to the neck.  Pulmonary/Chest: Effort normal and breath sounds normal.   Abdominal: Soft. Bowel sounds are normal. There is no abdominal tenderness.   Musculoskeletal:      Right lower leg: No edema.      Left  lower leg: No edema.   Neurological: She is alert and oriented to person, place, and time.   Skin: She is not diaphoretic.   Psychiatric: She has a normal mood and affect. Her speech is normal and behavior is normal.   Vitals reviewed.      Significant Labs:   CMP No results for input(s): NA, K, CL, CO2, GLU, BUN, CREATININE, CALCIUM, PROT, ALBUMIN, BILITOT, ALKPHOS, AST, ALT, ANIONGAP, ESTGFRAFRICA, EGFRNONAA in the last 48 hours., CBC   Recent Labs   Lab 11/26/20  1040 11/26/20  1101   WBC 7.01  --    HGB 11.3*  --    HCT 35.9* 37     --    , Lipid Panel No results for input(s): CHOL, HDL, LDLCALC, TRIG, CHOLHDL in the last 48 hours. and Troponin   Recent Labs   Lab 11/26/20  1040   TROPONINI 1.382*       Significant Imaging: Echocardiogram:   Transthoracic echo (TTE) complete (Cupid Only):   Results for orders placed or performed during the hospital encounter of 04/02/19   Transthoracic echo (TTE) 2D with Color Flow   Result Value Ref Range    BSA 1.49 m2    LA WIDTH 3.49 cm    AORTIC VALVE CUSP SEPERATION 1.28 cm    PV PEAK VELOCITY 1.01 cm/s    LVIDd 3.33 (A) 3.5 - 6.0 cm    IVS 1.11 (A) 0.6 - 1.1 cm    Posterior Wall 1.11 (A) 0.6 - 1.1 cm    Ao root annulus 2.49 cm    LVIDs 2.46 2.1 - 4.0 cm    FS 26 28 - 44 %    LA volume 37.58 cm3    Sinus 3.04 cm    STJ 2.24 cm    Ascending aorta 3.94 cm    LV mass 112.11 g    LA size 3.10 cm    RVDD 2.14 cm    TAPSE 1.73 cm    RV S' 10.60 m/s    Left Ventricle Relative Wall Thickness 0.67 cm    AV mean gradient 11.75 mmHg    AV valve area 1.71 cm2    AV Velocity Ratio 0.53     AV index (prosthetic) 0.53     E/A ratio 0.52     E wave decelartion time 408.55 msec    IVRT 0.07 msec    Pulm vein S/D ratio 2.96     LVOT diameter 2.02 cm    LVOT area 3.20 cm2    LVOT peak abimael [id number] m/s    LVOT peak VTI 22.49 cm    Ao peak abimael 2.33 m/s    Ao VTI 42.05 cm    LVOT stroke volume 72.04 cm3    AV peak gradient 21.72 mmHg    MV Peak E Abimael 0.71 m/s    TR Max Abimael 2.35 m/s     MV Peak A Abimael 1.37 m/s    PV Peak S Abimael 0.83 m/s    PV Peak D Abimael 0.28 m/s    LV Systolic Volume 21.47 mL    LV Systolic Volume Index 14.6 mL/m2    LV Diastolic Volume 45.27 mL    LV Diastolic Volume Index 30.83 mL/m2    LA Volume Index 25.6 mL/m2    LV Mass Index 76.4 g/m2    RA Major Axis 5.25 cm    Left Atrium Minor Axis 4.76 cm    Left Atrium Major Axis 3.58 cm    Triscuspid Valve Regurgitation Peak Gradient 22.09 mmHg    RA Width 2.23 cm    Right Atrial Pressure (from IVC) 3 mmHg    TV rest pulmonary artery pressure 25 mmHg    Narrative    · Normal left ventricular systolic function. The estimated ejection   fraction is 55%  · Concentric left ventricular remodeling.  · Mild aortic valve stenosis.  · Aortic valve area is 1.71 cm2; peak velocity is 2.33 m/s; mean gradient   is 11.75 mmHg.       and telemetry:  SVT with aberrancy

## 2020-11-26 NOTE — CONSULTS
[facility]  Cardiology  Consult Note    Patient Name: Cleopatra Lozada  MRN: [id number]  Admission Date: 11/26/2020  Hospital Length of Stay: 0 days  Code Status: Prior   Attending Provider: Vernon Fabian MD   Consulting Provider: Maico Tervino MD  Primary Care Physician: Latesha Dias MD  Principal Problem:<principal problem not specified>    Patient information was obtained from patient, past medical records and ER records.     Inpatient consult to Cardiology  Consult performed by: Maico Trevino MD  Consult ordered by: Vernon Fabian MD        Subjective:     Chief Complaint:  Abnormal troponin/telemetry     HPI:   Cleopatra Lozada is a 80 y.o. female who presents to the ED with a dislocated left shoulder. Patient reports she lifted her arm and dislocated her left shoulder 2 hours prior to arrival.  Prior to setting shoulder she was noted to have an arrhythmia on telemetry.  No history of coronary artery disease.  Ischemic evaluation last year.  Nuclear myocardial perfusion study was negative for ischemia.  Echocardiogram showed normal left ventricular systolic function.  The patient states that she has episodes where she feels palpitations that can last upwards of 30 min at a time.  Review of the telemetry strip shows what I think is SVT with aberrancy.  Not ventricular tachycardia.  Troponin elevated.  Baseline EKG shows no ischemic changes.  Cardiovascular risk factors hypertension, hyperlipidemia.  Blood pressure significantly elevated on presentation.    Nuclear myocardial perfusion study 04/03/2019:    · Normal myocardial perfusion scan  · Arrhythmias during stress: PACs.ISOLATED PACs  · There was no ST segment deviation noted during stress.  · The patient reported light headedness and abdominal pain during the stress test.  · The perfusion scan is free of evidence from myocardial ischemia or injury.  · LVEF post-stress is 78%  · The EKG portion of this study is  negative for myocardial ischemia.    Echocardiogram 04/03/2019:    · Normal left ventricular systolic function. The estimated ejection fraction is 55%  · Concentric left ventricular remodeling.  · Mild aortic valve stenosis.  Aortic valve area is 1.71 cm2; peak velocity is 2.33 m/s; mean gradient is 11.75 mmHg.    Past Medical History:   Diagnosis Date    Aneurysm     abdominal, repaired 11/15/2011    Cigarette smoker     patient reports buying loose tobacco and rolling with a machine, at home    Hypertension     TIA (transient ischemic attack)     patient states 3rd episode       Past Surgical History:   Procedure Laterality Date    ABDOMINAL AORTIC ANEURYSM REPAIR  11/15/2011    CAROTID ENDARTERECTOMY Right 4/10/2019    Procedure: ENDARTERECTOMY-CAROTID, RIGHT;  Surgeon: Pietro Steward MD;  Location: [facility] OR [address] [place] [address];  Service: Vascular;  Laterality: Right;    CLOSED REDUCTION Left 7/20/2020    Procedure: CLOSED REDUCTION OF LEFT SHOULDHER;  Surgeon: Alina Surgeon;  Location: [facility];  Service: Anesthesiology;  Laterality: Left;    CLOSED REDUCTION Left 8/15/2020    Procedure: CLOSED REDUCTION;  Surgeon: Ailna Surgeon;  Location: [facility];  Service: Anesthesiology;  Laterality: Left;    TONSILLECTOMY         Review of patient's allergies indicates:   Allergen Reactions    Tetanus vaccines and toxoid Rash       No current facility-administered medications on file prior to encounter.      Current Outpatient Medications on File Prior to Encounter   Medication Sig    aspirin (ECOTRIN) 81 MG EC tablet Take 1 tablet (81 mg total) by mouth once daily.    atorvastatin (LIPITOR) 80 MG tablet Take 1 tablet (80 mg total) by mouth once daily.    clopidogrel (PLAVIX) 75 mg tablet Take 1 tablet (75 mg total) by mouth once daily.    losartan (COZAAR) 50 MG tablet Take 1 tablet (50 mg total) by mouth once daily.    metoprolol succinate (TOPROL-XL) 25 MG 24 hr tablet Take 1 tablet (25 mg total) by mouth once  daily.    HYDROcodone-acetaminophen (NORCO) 5-325 mg per tablet Take 1 tablet by mouth every 6 (six) hours as needed.     Family History     Problem Relation (Age of Onset)    Cancer Father    Heart disease Mother        Tobacco Use    Smoking status: Former Smoker     Packs/day: 0.50     Types: Cigarettes     Quit date: 3/11/2019     Years since quittin.7    Smokeless tobacco: Never Used    Tobacco comment: reports using loose tobacco & rolling herself, by machine   Substance and Sexual Activity    Alcohol use: No    Drug use: No    Sexual activity: Not on file     Review of Systems   Cardiovascular: Positive for irregular heartbeat. Negative for chest pain, dyspnea on exertion, leg swelling, near-syncope, orthopnea, palpitations, paroxysmal nocturnal dyspnea and syncope.   Respiratory: Negative for shortness of breath, sputum production and wheezing.    Musculoskeletal: Positive for joint pain.     Objective:     Vital Signs (Most Recent):  Temp: 97.9 °F (36.6 °C) (20 0911)  Pulse: 81 (20 1201)  Resp: (!) 21 (20 1201)  BP: (!) 196/80 (20 1201)  SpO2: 96 % (20 1201) Vital Signs (24h Range):  Temp:  [97.9 °F (36.6 °C)] 97.9 °F (36.6 °C)  Pulse:  [73-93] 81  Resp:  [9-32] 21  SpO2:  [94 %-100 %] 96 %  BP: (135-204)/() 196/80     Weight: 54.4 kg (120 lb)  Body mass index is 23.44 kg/m².    SpO2: 96 %  O2 Device (Oxygen Therapy): nasal cannula    No intake or output data in the 24 hours ending 20 1204    Lines/Drains/Airways     Peripheral Intravenous Line                 Peripheral IV - Single Lumen 08/15/20 0945 20 G Right Antecubital 103 days         Peripheral IV - Single Lumen 20 0940 20 G Right Antecubital less than 1 day                Physical Exam   Constitutional: She is oriented to person, place, and time. No distress.   Neck: No JVD present. Carotid bruit is not present.   Cardiovascular: Normal rate, regular rhythm and normal pulses.   Murmur  heard.   Harsh midsystolic murmur is present with a grade of 3/6 at the upper right sternal border radiating to the neck.  Pulmonary/Chest: Effort normal and breath sounds normal.   Abdominal: Soft. Bowel sounds are normal. There is no abdominal tenderness.   Musculoskeletal:      Right lower leg: No edema.      Left lower leg: No edema.   Neurological: She is alert and oriented to person, place, and time.   Skin: She is not diaphoretic.   Psychiatric: She has a normal mood and affect. Her speech is normal and behavior is normal.   Vitals reviewed.      Significant Labs:   CMP No results for input(s): NA, K, CL, CO2, GLU, BUN, CREATININE, CALCIUM, PROT, ALBUMIN, BILITOT, ALKPHOS, AST, ALT, ANIONGAP, ESTGFRAFRICA, EGFRNONAA in the last 48 hours., CBC   Recent Labs   Lab 11/26/20  1040 11/26/20  1101   WBC 7.01  --    HGB 11.3*  --    HCT 35.9* 37     --    , Lipid Panel No results for input(s): CHOL, HDL, LDLCALC, TRIG, CHOLHDL in the last 48 hours. and Troponin   Recent Labs   Lab 11/26/20  1040   TROPONINI 1.382*       Significant Imaging: Echocardiogram:   Transthoracic echo (TTE) complete (Cupid Only):   Results for orders placed or performed during the hospital encounter of 04/02/19   Transthoracic echo (TTE) 2D with Color Flow   Result Value Ref Range    BSA 1.49 m2    LA WIDTH 3.49 cm    AORTIC VALVE CUSP SEPERATION 1.28 cm    PV PEAK VELOCITY 1.01 cm/s    LVIDd 3.33 (A) 3.5 - 6.0 cm    IVS 1.11 (A) 0.6 - 1.1 cm    Posterior Wall 1.11 (A) 0.6 - 1.1 cm    Ao root annulus 2.49 cm    LVIDs 2.46 2.1 - 4.0 cm    FS 26 28 - 44 %    LA volume 37.58 cm3    Sinus 3.04 cm    STJ 2.24 cm    Ascending aorta 3.94 cm    LV mass 112.11 g    LA size 3.10 cm    RVDD 2.14 cm    TAPSE 1.73 cm    RV S' 10.60 m/s    Left Ventricle Relative Wall Thickness 0.67 cm    AV mean gradient 11.75 mmHg    AV valve area 1.71 cm2    AV Velocity Ratio 0.53     AV index (prosthetic) 0.53     E/A ratio 0.52     E wave decelartion time  408.55 msec    IVRT 0.07 msec    Pulm vein S/D ratio 2.96     LVOT diameter 2.02 cm    LVOT area 3.20 cm2    LVOT peak abimael [id number] m/s    LVOT peak VTI 22.49 cm    Ao peak abimael 2.33 m/s    Ao VTI 42.05 cm    LVOT stroke volume 72.04 cm3    AV peak gradient 21.72 mmHg    MV Peak E Abimael 0.71 m/s    TR Max Abimael 2.35 m/s    MV Peak A Abimael 1.37 m/s    PV Peak S Abimael 0.83 m/s    PV Peak D Abimael 0.28 m/s    LV Systolic Volume 21.47 mL    LV Systolic Volume Index 14.6 mL/m2    LV Diastolic Volume 45.27 mL    LV Diastolic Volume Index 30.83 mL/m2    LA Volume Index 25.6 mL/m2    LV Mass Index 76.4 g/m2    RA Major Axis 5.25 cm    Left Atrium Minor Axis 4.76 cm    Left Atrium Major Axis 3.58 cm    Triscuspid Valve Regurgitation Peak Gradient 22.09 mmHg    RA Width 2.23 cm    Right Atrial Pressure (from IVC) 3 mmHg    TV rest pulmonary artery pressure 25 mmHg    Narrative    · Normal left ventricular systolic function. The estimated ejection   fraction is 55%  · Concentric left ventricular remodeling.  · Mild aortic valve stenosis.  · Aortic valve area is 1.71 cm2; peak velocity is 2.33 m/s; mean gradient   is 11.75 mmHg.       and telemetry:  SVT with aberrancy    Assessment and Plan:     Aortic stenosis  Mild by echocardiogram last year.    Hyperlipidemia  Continue statin.    Troponin I above reference range  Hypertension.  SVT.  Trend.  Baseline EKG nonischemic.  Patient denies symptoms.  No chest pain or shortness of breath.  Check echocardiogram.  She is on aspirin and Plavix at home.  Continue her statin.  Continue her home blood pressure regimen.    SVT (supraventricular tachycardia)  Episode emergency room appears to be SVT with aberrancy.  Triggered by PVC.  Do not feel it is ventricular tachycardia.  Titrate beta-blocker as tolerated.    Essential hypertension  Patient did not take her medications this morning.  Restart.  Monitor.      VTE Risk Mitigation (From admission, onward)    None          Thank you for your  consult. I will follow-up with patient. Please contact us if you have any additional questions.    Maico Trevino MD  Cardiology   [facility]

## 2020-11-26 NOTE — ASSESSMENT & PLAN NOTE
Cr today of 1.3 on ISTAT.  Baseline 0.9-1.3.   Renal dose medications avoid nephrotoxic drugs/repeat labs in AM  CMP pending

## 2020-11-26 NOTE — HPI
"Cleopatra Lozada 80 y.o. female with history of CVA, CKD, HTN, HLD presents to the hospital with a chief complaint of shoulder pain. This morning when attempting to life her arm over her head she dislocated her left shoulder. She reports this is a fequent occurrence. She follows with physician outpt. She typically comes to the ED to have the shoulder reduced and is discharged.     While in the ED she was found to have SVT with a troponin of 1.3. She reports she had a "brief moment of chest pain" she is unable to describe the pain as it occurred so quickly. She is unsure of any aggravating or alleviating factors. She feels well currently and would prefer discharge. She no longer had pain in her shoulder. She denies fever, SOB, N/V, abdominal pain, dizziness, dysuria, numbness in her left arm, syncope.     In the ED, initial xray with anterior shoulder dislocation, repeat x-ray after reduction shows left humeral head appropriately positioned related to glenoid, SVT seen on monitor, troponin 1.3, and COVID negative.  "

## 2020-11-26 NOTE — SUBJECTIVE & OBJECTIVE
Past Medical History:   Diagnosis Date    Aneurysm     abdominal, repaired 11/15/2011    Cigarette smoker     patient reports buying loose tobacco and rolling with a machine, at home    Hypertension     TIA (transient ischemic attack)     patient states 3rd episode       Past Surgical History:   Procedure Laterality Date    ABDOMINAL AORTIC ANEURYSM REPAIR  11/15/2011    CAROTID ENDARTERECTOMY Right 4/10/2019    Procedure: ENDARTERECTOMY-CAROTID, RIGHT;  Surgeon: Pietro Steward MD;  Location: [address];  Service: Vascular;  Laterality: Right;    CLOSED REDUCTION Left 7/20/2020    Procedure: CLOSED REDUCTION OF LEFT SHOULDHER;  Surgeon: Alina Surgeon;  Location: [facility];  Service: Anesthesiology;  Laterality: Left;    CLOSED REDUCTION Left 8/15/2020    Procedure: CLOSED REDUCTION;  Surgeon: Alina Surgeon;  Location: [facility];  Service: Anesthesiology;  Laterality: Left;    TONSILLECTOMY         Review of patient's allergies indicates:   Allergen Reactions    Tetanus vaccines and toxoid Rash       No current facility-administered medications on file prior to encounter.      Current Outpatient Medications on File Prior to Encounter   Medication Sig    aspirin (ECOTRIN) 81 MG EC tablet Take 1 tablet (81 mg total) by mouth once daily.    atorvastatin (LIPITOR) 80 MG tablet Take 1 tablet (80 mg total) by mouth once daily.    clopidogrel (PLAVIX) 75 mg tablet Take 1 tablet (75 mg total) by mouth once daily.    losartan (COZAAR) 50 MG tablet Take 1 tablet (50 mg total) by mouth once daily.    metoprolol succinate (TOPROL-XL) 25 MG 24 hr tablet Take 1 tablet (25 mg total) by mouth once daily.    HYDROcodone-acetaminophen (NORCO) 5-325 mg per tablet Take 1 tablet by mouth every 6 (six) hours as needed.     Family History     Problem Relation (Age of Onset)    Cancer Father    Heart disease Mother        Tobacco Use    Smoking status: Former Smoker     Packs/day: 0.50     Types: Cigarettes      Quit date: 3/11/2019     Years since quittin.7    Smokeless tobacco: Never Used    Tobacco comment: reports using loose tobacco & rolling herself, by machine   Substance and Sexual Activity    Alcohol use: No    Drug use: No    Sexual activity: Not on file     Review of Systems   Constitutional: Negative for chills and fever.   HENT: Negative for nosebleeds and tinnitus.    Eyes: Negative for photophobia and visual disturbance.   Respiratory: Negative for shortness of breath and wheezing.    Cardiovascular: Negative for chest pain, palpitations and leg swelling.   Gastrointestinal: Negative for abdominal distention, nausea and vomiting.   Genitourinary: Negative for dysuria, flank pain and hematuria.   Musculoskeletal: Positive for arthralgias and joint swelling. Negative for gait problem.   Skin: Negative for rash and wound.   Neurological: Negative for seizures and syncope.     Objective:     Vital Signs (Most Recent):  Temp: 98.1 °F (36.7 °C) (20 1335)  Pulse: 88 (20 1335)  Resp: 20 (20 1335)  BP: (!) 180/84 (20 1335)  SpO2: 95 % (20 1335) Vital Signs (24h Range):  Temp:  [97.9 °F (36.6 °C)-98.1 °F (36.7 °C)] 98.1 °F (36.7 °C)  Pulse:  [73-93] 88  Resp:  [9-32] 20  SpO2:  [94 %-100 %] 95 %  BP: (135-204)/() 180/84     Weight: 54.4 kg (120 lb)  Body mass index is 23.44 kg/m².    Physical Exam  Vitals signs and nursing note reviewed.   Constitutional:       General: She is not in acute distress.     Appearance: She is well-developed.   HENT:      Head: Normocephalic and atraumatic.   Eyes:      General:         Right eye: No discharge.         Left eye: No discharge.      Conjunctiva/sclera: Conjunctivae normal.   Neck:      Musculoskeletal: Normal range of motion and neck supple.      Thyroid: No thyromegaly.   Cardiovascular:      Rate and Rhythm: Normal rate and regular rhythm.      Heart sounds: No murmur.   Pulmonary:      Effort: Pulmonary effort is normal. No  respiratory distress.      Breath sounds: Normal breath sounds.   Abdominal:      General: Bowel sounds are normal. There is no distension.      Palpations: Abdomen is soft. There is no mass.      Tenderness: There is no abdominal tenderness.   Musculoskeletal:         General: Tenderness (mild tenderness across anterior of left shoulder) present. No deformity.      Comments: Left arm in sling. 2+ radial pulse. No sensation deficit   Skin:     General: Skin is warm and dry.   Neurological:      Mental Status: She is alert and oriented to person, place, and time.   Psychiatric:         Behavior: Behavior normal.         Thought Content: Thought content normal.             Significant Labs:   CBC:   Recent Labs   Lab 11/26/20  1040 11/26/20  1101   WBC 7.01  --    HGB 11.3*  --    HCT 35.9* 37     --      CMP:   Recent Labs   Lab 11/26/20  1315      K 3.7      CO2 24   GLU 98   BUN 16   CREATININE 1.2   CALCIUM 8.5*   PROT 7.3   ALBUMIN 3.7   BILITOT 0.6   ALKPHOS 93   AST 26   ALT 9*   ANIONGAP 11   EGFRNONAA 42*     Troponin:   Recent Labs   Lab 11/26/20  1040   TROPONINI 1.382*       Significant Imaging:   Imaging Results          X-Ray Shoulder 1 View Left (Final result)  Result time 11/26/20 12:42:45   Procedure changed from X-Ray Shoulder 2 or More Views Left     Final result by Felice Thornton MD (11/26/20 12:42:45)                 Impression:      As above      Electronically signed by: Felice Thornton MD  Date:    11/26/2020  Time:    12:42             Narrative:    EXAMINATION:  XR SHOULDER 1 VIEW LEFT    CLINICAL HISTORY:  Please include axillary view;    COMPARISON:  11/26/2020    FINDINGS:  Single axillary view left shoulder.    The left humeral head appears to be appropriately positioned in relationship to the glenoid.  No acute displaced fracture.                               X-Ray Shoulder 2 or More Views Left (Final result)  Result time 11/26/20 11:53:53    Final result by  Vane Brown MD (11/26/20 11:53:53)                 Impression:      See above.      Electronically signed by: Vane Brown MD  Date:    11/26/2020  Time:    11:53             Narrative:    EXAMINATION:  XR SHOULDER COMPLETE 2 OR MORE VIEWS LEFT    CLINICAL HISTORY:  shoulder dislocation;    TECHNIQUE:  Two or three views of the left shoulder were performed.    COMPARISON:  Prior from same day at 09:31    FINDINGS:  There is no longer evidence of anterior shoulder dislocation.  However the humeral head appears somewhat high riding and transscapular view is limited, appropriate glenohumeral alignment cannot be confirmed.  Axillary view is recommended for further evaluation.                               X-Ray Shoulder 2 or More Views Left (Final result)  Result time 11/26/20 10:08:24    Final result by Vane Brown MD (11/26/20 10:08:24)                 Impression:      Anterior shoulder dislocation.      Electronically signed by: Vane Brown MD  Date:    11/26/2020  Time:    10:08             Narrative:    EXAMINATION:  XR SHOULDER COMPLETE 2 OR MORE VIEWS LEFT    CLINICAL HISTORY:  left shoulder pain;    TECHNIQUE:  Two or three views of the left shoulder were performed.    COMPARISON:  Prior dated 08/15/2020    FINDINGS:  There is anterior dislocation of the humeral head with respect to the glenoid.  There is diffuse osteopenia.  There is mild degenerative change of the acromioclavicular joint.  No acute fracture is identified.

## 2020-11-26 NOTE — H&P
"[facility] Hospital Medicine  History & Physical    Patient Name: Cleopatra Lozada  MRN: [id number]  Admission Date: 11/26/2020  Attending Physician: Vernon Fabian MD   Primary Care Provider: Latesha Dias MD         Patient information was obtained from patient, past medical records and ER records.     Subjective:     Principal Problem:Troponin I above reference range    Chief Complaint:   Chief Complaint   Patient presents with    Shoulder Injury     Pt reports she was reaching to get something and dislocated to LEFT shoulder this AM. Pain is 10/10        HPI: Cleopatra Lozada 80 y.o. female with history of CVA, CKD, HTN, HLD presents to the hospital with a chief complaint of shoulder pain. This morning when attempting to life her arm over her head she dislocated her left shoulder. She reports this is a fequent occurrence. She follows with physician outpt. She typically comes to the ED to have the shoulder reduced and is discharged.     While in the ED she was found to have SVT with a troponin of 1.3. She reports she had a "brief moment of chest pain" she is unable to describe the pain as it occurred so quickly. She is unsure of any aggravating or alleviating factors. She feels well currently and would prefer discharge. She no longer had pain in her shoulder. She denies fever, SOB, N/V, abdominal pain, dizziness, dysuria, numbness in her left arm, syncope.     In the ED, initial xray with anterior shoulder dislocation, repeat x-ray after reduction shows left humeral head appropriately positioned related to glenoid, SVT seen on monitor, troponin 1.3, and COVID negative.    Past Medical History:   Diagnosis Date    Aneurysm     abdominal, repaired 11/15/2011    Cigarette smoker     patient reports buying loose tobacco and rolling with a machine, at home    Hypertension     TIA (transient ischemic attack)     patient states 3rd episode       Past Surgical History:   Procedure " Laterality Date    ABDOMINAL AORTIC ANEURYSM REPAIR  11/15/2011    CAROTID ENDARTERECTOMY Right 4/10/2019    Procedure: ENDARTERECTOMY-CAROTID, RIGHT;  Surgeon: Pietro Steward MD;  Location: [address];  Service: Vascular;  Laterality: Right;    CLOSED REDUCTION Left 2020    Procedure: CLOSED REDUCTION OF LEFT SHOULDHER;  Surgeon: Alina Surgeon;  Location: [facility];  Service: Anesthesiology;  Laterality: Left;    CLOSED REDUCTION Left 8/15/2020    Procedure: CLOSED REDUCTION;  Surgeon: Alina Surgeon;  Location: [facility];  Service: Anesthesiology;  Laterality: Left;    TONSILLECTOMY         Review of patient's allergies indicates:   Allergen Reactions    Tetanus vaccines and toxoid Rash       No current facility-administered medications on file prior to encounter.      Current Outpatient Medications on File Prior to Encounter   Medication Sig    aspirin (ECOTRIN) 81 MG EC tablet Take 1 tablet (81 mg total) by mouth once daily.    atorvastatin (LIPITOR) 80 MG tablet Take 1 tablet (80 mg total) by mouth once daily.    clopidogrel (PLAVIX) 75 mg tablet Take 1 tablet (75 mg total) by mouth once daily.    losartan (COZAAR) 50 MG tablet Take 1 tablet (50 mg total) by mouth once daily.    metoprolol succinate (TOPROL-XL) 25 MG 24 hr tablet Take 1 tablet (25 mg total) by mouth once daily.    HYDROcodone-acetaminophen (NORCO) 5-325 mg per tablet Take 1 tablet by mouth every 6 (six) hours as needed.     Family History     Problem Relation (Age of Onset)    Cancer Father    Heart disease Mother        Tobacco Use    Smoking status: Former Smoker     Packs/day: 0.50     Types: Cigarettes     Quit date: 3/11/2019     Years since quittin.7    Smokeless tobacco: Never Used    Tobacco comment: reports using loose tobacco & rolling herself, by machine   Substance and Sexual Activity    Alcohol use: No    Drug use: No    Sexual activity: Not on file     Review of Systems   Constitutional: Negative  for chills and fever.   HENT: Negative for nosebleeds and tinnitus.    Eyes: Negative for photophobia and visual disturbance.   Respiratory: Negative for shortness of breath and wheezing.    Cardiovascular: Negative for chest pain, palpitations and leg swelling.   Gastrointestinal: Negative for abdominal distention, nausea and vomiting.   Genitourinary: Negative for dysuria, flank pain and hematuria.   Musculoskeletal: Positive for arthralgias and joint swelling. Negative for gait problem.   Skin: Negative for rash and wound.   Neurological: Negative for seizures and syncope.     Objective:     Vital Signs (Most Recent):  Temp: 98.1 °F (36.7 °C) (11/26/20 1335)  Pulse: 88 (11/26/20 1335)  Resp: 20 (11/26/20 1335)  BP: (!) 180/84 (11/26/20 1335)  SpO2: 95 % (11/26/20 1335) Vital Signs (24h Range):  Temp:  [97.9 °F (36.6 °C)-98.1 °F (36.7 °C)] 98.1 °F (36.7 °C)  Pulse:  [73-93] 88  Resp:  [9-32] 20  SpO2:  [94 %-100 %] 95 %  BP: (135-204)/() 180/84     Weight: 54.4 kg (120 lb)  Body mass index is 23.44 kg/m².    Physical Exam  Vitals signs and nursing note reviewed.   Constitutional:       General: She is not in acute distress.     Appearance: She is well-developed.   HENT:      Head: Normocephalic and atraumatic.   Eyes:      General:         Right eye: No discharge.         Left eye: No discharge.      Conjunctiva/sclera: Conjunctivae normal.   Neck:      Musculoskeletal: Normal range of motion and neck supple.      Thyroid: No thyromegaly.   Cardiovascular:      Rate and Rhythm: Normal rate and regular rhythm.      Heart sounds: No murmur.   Pulmonary:      Effort: Pulmonary effort is normal. No respiratory distress.      Breath sounds: Normal breath sounds.   Abdominal:      General: Bowel sounds are normal. There is no distension.      Palpations: Abdomen is soft. There is no mass.      Tenderness: There is no abdominal tenderness.   Musculoskeletal:         General: Tenderness (mild tenderness across  anterior of left shoulder) present. No deformity.      Comments: Left arm in sling. 2+ radial pulse. No sensation deficit   Skin:     General: Skin is warm and dry.   Neurological:      Mental Status: She is alert and oriented to person, place, and time.   Psychiatric:         Behavior: Behavior normal.         Thought Content: Thought content normal.             Significant Labs:   CBC:   Recent Labs   Lab 11/26/20  1040 11/26/20  1101   WBC 7.01  --    HGB 11.3*  --    HCT 35.9* 37     --      CMP:   Recent Labs   Lab 11/26/20  1315      K 3.7      CO2 24   GLU 98   BUN 16   CREATININE 1.2   CALCIUM 8.5*   PROT 7.3   ALBUMIN 3.7   BILITOT 0.6   ALKPHOS 93   AST 26   ALT 9*   ANIONGAP 11   EGFRNONAA 42*     Troponin:   Recent Labs   Lab 11/26/20  1040   TROPONINI 1.382*       Significant Imaging:   Imaging Results          X-Ray Shoulder 1 View Left (Final result)  Result time 11/26/20 12:42:45   Procedure changed from X-Ray Shoulder 2 or More Views Left     Final result by Felice Thornton MD (11/26/20 12:42:45)                 Impression:      As above      Electronically signed by: Felice Thornton MD  Date:    11/26/2020  Time:    12:42             Narrative:    EXAMINATION:  XR SHOULDER 1 VIEW LEFT    CLINICAL HISTORY:  Please include axillary view;    COMPARISON:  11/26/2020    FINDINGS:  Single axillary view left shoulder.    The left humeral head appears to be appropriately positioned in relationship to the glenoid.  No acute displaced fracture.                               X-Ray Shoulder 2 or More Views Left (Final result)  Result time 11/26/20 11:53:53    Final result by Vane Brown MD (11/26/20 11:53:53)                 Impression:      See above.      Electronically signed by: Vane Brown MD  Date:    11/26/2020  Time:    11:53             Narrative:    EXAMINATION:  XR SHOULDER COMPLETE 2 OR MORE VIEWS LEFT    CLINICAL HISTORY:  shoulder  dislocation;    TECHNIQUE:  Two or three views of the left shoulder were performed.    COMPARISON:  Prior from same day at 09:31    FINDINGS:  There is no longer evidence of anterior shoulder dislocation.  However the humeral head appears somewhat high riding and transscapular view is limited, appropriate glenohumeral alignment cannot be confirmed.  Axillary view is recommended for further evaluation.                               X-Ray Shoulder 2 or More Views Left (Final result)  Result time 11/26/20 10:08:24    Final result by Vane Brown MD (11/26/20 10:08:24)                 Impression:      Anterior shoulder dislocation.      Electronically signed by: Vane Brown MD  Date:    11/26/2020  Time:    10:08             Narrative:    EXAMINATION:  XR SHOULDER COMPLETE 2 OR MORE VIEWS LEFT    CLINICAL HISTORY:  left shoulder pain;    TECHNIQUE:  Two or three views of the left shoulder were performed.    COMPARISON:  Prior dated 08/15/2020    FINDINGS:  There is anterior dislocation of the humeral head with respect to the glenoid.  There is diffuse osteopenia.  There is mild degenerative change of the acromioclavicular joint.  No acute fracture is identified.                                  Assessment/Plan:     * Troponin I above reference range  Troponin of 1.3 with SVT seen in ED. Denies any chest pain/SOB. EKG with ST segment elevation  -continue home aspirin/statin/plavix  -cardiology consulted  -echo  -troponin trend  -telemetry  -TSH/A1c/lipid/mg pending    Aortic stenosis  Repeat Echo pending    Hyperlipidemia  Continue home statin    SVT (supraventricular tachycardia)  See above  Cardiology consulted  On metoprolol    CKD (chronic kidney disease) stage 3, GFR 30-59 ml/min  Cr today of 1.3 on ISTAT.  Baseline 0.9-1.3.   Renal dose medications avoid nephrotoxic drugs/repeat labs in AM  CMP pending    Essential hypertension  Poorly controlled continue metoprolol/losartan. PRN clonidine      VTE  Risk Mitigation (From admission, onward)         Ordered     IP VTE HIGH RISK PATIENT  Once      11/26/20 1247     Place sequential compression device  Until discontinued      11/26/20 1247     Place KIERSTEN hose  Until discontinued      11/26/20 1247               VTE: KIERSTEN/SCD  Code: Full  Diet: cardiac  Dispo: pending troponin trend and cardiology recommendation  As clarification, on 11/26/2020, patient should be admitted for hospital observation services under my care in collaboration with Josi Kelly MD. Abdulaziz Jordan PA-C  Department of Hospital Medicine   [facility]

## 2020-11-27 VITALS
WEIGHT: 119.5 LBS | TEMPERATURE: 99 F | RESPIRATION RATE: 16 BRPM | DIASTOLIC BLOOD PRESSURE: 72 MMHG | HEART RATE: 65 BPM | BODY MASS INDEX: 23.46 KG/M2 | HEIGHT: 60 IN | OXYGEN SATURATION: 95 % | SYSTOLIC BLOOD PRESSURE: 148 MMHG

## 2020-11-27 LAB
ALBUMIN SERPL BCP-MCNC: 3.2 G/DL (ref 3.5–5.2)
ALP SERPL-CCNC: 84 U/L (ref 55–135)
ALT SERPL W/O P-5'-P-CCNC: 9 U/L (ref 10–44)
ANION GAP SERPL CALC-SCNC: 10 MMOL/L (ref 8–16)
ASCENDING AORTA: 3.02 CM
AST SERPL-CCNC: 20 U/L (ref 10–40)
AV INDEX (PROSTH): 0.45
AV MEAN GRADIENT: 16 MMHG
AV PEAK GRADIENT: 32 MMHG
AV VALVE AREA: 1.37 CM2
AV VELOCITY RATIO: 0.43
BASOPHILS # BLD AUTO: 0.02 K/UL (ref 0–0.2)
BASOPHILS NFR BLD: 0.3 % (ref 0–1.9)
BILIRUB SERPL-MCNC: 0.6 MG/DL (ref 0.1–1)
BSA FOR ECHO PROCEDURE: 1.52 M2
BUN SERPL-MCNC: 18 MG/DL (ref 8–23)
CALCIUM SERPL-MCNC: 8.5 MG/DL (ref 8.7–10.5)
CHLORIDE SERPL-SCNC: 109 MMOL/L (ref 95–110)
CHOLEST SERPL-MCNC: 129 MG/DL (ref 120–199)
CHOLEST/HDLC SERPL: 3.9 {RATIO} (ref 2–5)
CO2 SERPL-SCNC: 23 MMOL/L (ref 23–29)
CREAT SERPL-MCNC: 1.3 MG/DL (ref 0.5–1.4)
CV ECHO LV RWT: 0.58 CM
DIFFERENTIAL METHOD: ABNORMAL
DOP CALC AO PEAK VEL: 2.81 M/S
DOP CALC AO VTI: 69.15 CM
DOP CALC LVOT AREA: 3 CM2
DOP CALC LVOT DIAMETER: 1.96 CM
DOP CALC LVOT PEAK VEL: 1.22 M/S
DOP CALC LVOT STROKE VOLUME: 94.75 CM3
DOP CALCLVOT PEAK VEL VTI: 31.42 CM
E WAVE DECELERATION TIME: 284.11 MSEC
E/A RATIO: 0.62
E/E' RATIO: 14.67 M/S
ECHO LV POSTERIOR WALL: 1.09 CM (ref 0.6–1.1)
EOSINOPHIL # BLD AUTO: 0.1 K/UL (ref 0–0.5)
EOSINOPHIL NFR BLD: 1.9 % (ref 0–8)
ERYTHROCYTE [DISTWIDTH] IN BLOOD BY AUTOMATED COUNT: 15.3 % (ref 11.5–14.5)
EST. GFR  (AFRICAN AMERICAN): 44 ML/MIN/1.73 M^2
EST. GFR  (NON AFRICAN AMERICAN): 39 ML/MIN/1.73 M^2
ESTIMATED AVG GLUCOSE: 105 MG/DL (ref 68–131)
FRACTIONAL SHORTENING: 51 % (ref 28–44)
GLUCOSE SERPL-MCNC: 97 MG/DL (ref 70–110)
HBA1C MFR BLD HPLC: 5.3 % (ref 4–5.6)
HCT VFR BLD AUTO: 32.7 % (ref 37–48.5)
HDLC SERPL-MCNC: 33 MG/DL (ref 40–75)
HDLC SERPL: 25.6 % (ref 20–50)
HGB BLD-MCNC: 10.2 G/DL (ref 12–16)
IMM GRANULOCYTES # BLD AUTO: 0.01 K/UL (ref 0–0.04)
IMM GRANULOCYTES NFR BLD AUTO: 0.2 % (ref 0–0.5)
INTERVENTRICULAR SEPTUM: 1.03 CM (ref 0.6–1.1)
IVRT: 138.41 MSEC
LA MAJOR: 5.11 CM
LA MINOR: 5.05 CM
LA WIDTH: 3.99 CM
LDLC SERPL CALC-MCNC: 79.4 MG/DL (ref 63–159)
LEFT ATRIUM SIZE: 3.37 CM
LEFT ATRIUM VOLUME INDEX: 38.7 ML/M2
LEFT ATRIUM VOLUME: 58.06 CM3
LEFT INTERNAL DIMENSION IN SYSTOLE: 1.86 CM (ref 2.1–4)
LEFT VENTRICLE DIASTOLIC VOLUME INDEX: 40.62 ML/M2
LEFT VENTRICLE DIASTOLIC VOLUME: 60.92 ML
LEFT VENTRICLE MASS INDEX: 84 G/M2
LEFT VENTRICLE SYSTOLIC VOLUME INDEX: 7 ML/M2
LEFT VENTRICLE SYSTOLIC VOLUME: 10.52 ML
LEFT VENTRICULAR INTERNAL DIMENSION IN DIASTOLE: 3.77 CM (ref 3.5–6)
LEFT VENTRICULAR MASS: 126.03 G
LV LATERAL E/E' RATIO: 12.57 M/S
LV SEPTAL E/E' RATIO: 17.6 M/S
LYMPHOCYTES # BLD AUTO: 1.2 K/UL (ref 1–4.8)
LYMPHOCYTES NFR BLD: 20.7 % (ref 18–48)
MAGNESIUM SERPL-MCNC: 1.9 MG/DL (ref 1.6–2.6)
MCH RBC QN AUTO: 27.2 PG (ref 27–31)
MCHC RBC AUTO-ENTMCNC: 31.2 G/DL (ref 32–36)
MCV RBC AUTO: 87 FL (ref 82–98)
MONOCYTES # BLD AUTO: 0.5 K/UL (ref 0.3–1)
MONOCYTES NFR BLD: 7.7 % (ref 4–15)
MV PEAK A VEL: 1.41 M/S
MV PEAK E VEL: 0.88 M/S
MV STENOSIS PRESSURE HALF TIME: 82.39 MS
MV VALVE AREA P 1/2 METHOD: 2.67 CM2
NEUTROPHILS # BLD AUTO: 4.1 K/UL (ref 1.8–7.7)
NEUTROPHILS NFR BLD: 69.2 % (ref 38–73)
NONHDLC SERPL-MCNC: 96 MG/DL
NRBC BLD-RTO: 0 /100 WBC
PISA TR MAX VEL: 2.88 M/S
PLATELET # BLD AUTO: 188 K/UL (ref 150–350)
PMV BLD AUTO: 10.2 FL (ref 9.2–12.9)
POTASSIUM SERPL-SCNC: 3.8 MMOL/L (ref 3.5–5.1)
PROT SERPL-MCNC: 6.5 G/DL (ref 6–8.4)
PULM VEIN S/D RATIO: 1.93
PV PEAK D VEL: 0.29 M/S
PV PEAK S VEL: 0.56 M/S
PV PEAK VELOCITY: 0.94 CM/S
RA MAJOR: 5.26 CM
RA PRESSURE: 3 MMHG
RA WIDTH: 2.9 CM
RBC # BLD AUTO: 3.75 M/UL (ref 4–5.4)
RIGHT VENTRICULAR END-DIASTOLIC DIMENSION: 2.34 CM
RV TISSUE DOPPLER FREE WALL SYSTOLIC VELOCITY 1 (APICAL 4 CHAMBER VIEW): 9.42 CM/S
SINUS: 3.1 CM
SODIUM SERPL-SCNC: 142 MMOL/L (ref 136–145)
STJ: 2.43 CM
TDI LATERAL: 0.07 M/S
TDI SEPTAL: 0.05 M/S
TDI: 0.06 M/S
TR MAX PG: 33 MMHG
TRICUSPID ANNULAR PLANE SYSTOLIC EXCURSION: 2.28 CM
TRIGL SERPL-MCNC: 83 MG/DL (ref 30–150)
TSH SERPL DL<=0.005 MIU/L-ACNC: 2.73 UIU/ML (ref 0.4–4)
TV REST PULMONARY ARTERY PRESSURE: 36 MMHG
WBC # BLD AUTO: 5.88 K/UL (ref 3.9–12.7)

## 2020-11-27 PROCEDURE — A4216 STERILE WATER/SALINE, 10 ML: HCPCS | Performed by: PHYSICIAN ASSISTANT

## 2020-11-27 PROCEDURE — 84443 ASSAY THYROID STIM HORMONE: CPT

## 2020-11-27 PROCEDURE — 99213 PR OFFICE/OUTPT VISIT, EST, LEVL III, 20-29 MIN: ICD-10-PCS | Mod: 25,,, | Performed by: INTERNAL MEDICINE

## 2020-11-27 PROCEDURE — 83735 ASSAY OF MAGNESIUM: CPT

## 2020-11-27 PROCEDURE — 36415 COLL VENOUS BLD VENIPUNCTURE: CPT

## 2020-11-27 PROCEDURE — 80053 COMPREHEN METABOLIC PANEL: CPT

## 2020-11-27 PROCEDURE — 85025 COMPLETE CBC W/AUTO DIFF WBC: CPT

## 2020-11-27 PROCEDURE — 99213 OFFICE O/P EST LOW 20 MIN: CPT | Mod: 25,,, | Performed by: INTERNAL MEDICINE

## 2020-11-27 PROCEDURE — G0378 HOSPITAL OBSERVATION PER HR: HCPCS

## 2020-11-27 PROCEDURE — 25000003 PHARM REV CODE 250: Performed by: PHYSICIAN ASSISTANT

## 2020-11-27 PROCEDURE — 80061 LIPID PANEL: CPT

## 2020-11-27 RX ADMIN — CLOPIDOGREL 75 MG: 75 TABLET, FILM COATED ORAL at 08:11

## 2020-11-27 RX ADMIN — Medication 10 ML: at 08:11

## 2020-11-27 RX ADMIN — METOPROLOL SUCCINATE 25 MG: 25 TABLET, EXTENDED RELEASE ORAL at 08:11

## 2020-11-27 RX ADMIN — ASPIRIN 81 MG: 81 TABLET, COATED ORAL at 08:11

## 2020-11-27 RX ADMIN — ATORVASTATIN CALCIUM 80 MG: 40 TABLET, FILM COATED ORAL at 08:11

## 2020-11-27 RX ADMIN — LOSARTAN POTASSIUM 50 MG: 25 TABLET, FILM COATED ORAL at 08:11

## 2020-11-27 NOTE — PLAN OF CARE
11/27/20 1454   Final Note   Assessment Type Final Discharge Note   Anticipated Discharge Disposition Home   What phone number can be called within the next 1-3 days to see how you are doing after discharge?   ([phone])   Hospital Follow Up  Appt(s) scheduled? Yes   Discharge plans and expectations educations in teach back method with documentation complete? Yes   Right Care Referral Info   Post Acute Recommendation No Care   Post-Acute Status   Post-Acute Authorization Other  (Home with family with cardiology follow-up)   Post-Acute Placement Status Set-up Complete   Other Status No Post-Acute Service Needs   Discharge Delays None known at this time

## 2020-11-27 NOTE — PLAN OF CARE
11/27/20 1046   Discharge Assessment   Assessment Type Discharge Planning Assessment   Confirmed/corrected address and phone number on facesheet? Yes   Assessment information obtained from? Patient;Medical Record   Expected Length of Stay (days) 1   Communicated expected length of stay with patient/caregiver yes   Prior to hospitilization cognitive status: Alert/Oriented   Prior to hospitalization functional status: Independent   Current cognitive status: Alert/Oriented   Current Functional Status: Independent   Facility Arrived From: Home   Lives With grandchild(tonya)   Able to Return to Prior Arrangements yes   Is patient able to care for self after discharge? Yes   Who are your caregiver(s) and their phone number(s)? Ang: [phone]   Patient's perception of discharge disposition home or selfcare   Readmission Within the Last 30 Days no previous admission in last 30 days   Patient currently being followed by outpatient case management? No   Patient currently receives any other outside agency services? No   Equipment Currently Used at Home none  (Has walker and cane--does not usually need or use)   Do you have any problems affording any of your prescribed medications? No   Is the patient taking medications as prescribed? yes   Does the patient have transportation home? Yes   Transportation Anticipated car, drives self;family or friend will provide   Does the patient receive services at the [facility]? No   Discharge Plan A Home with family   Discharge Plan B Other  (TBD)   DME Needed Upon Discharge  other (see comments)  (TBD)   Patient/Family in Agreement with Plan yes   SW Role explained to patient; two patient identifiers recognized; SW contact information placed on Communication board. Discussed patient managing health care at home; determined who would be helping patient at home with recovery: Marco Antonio will help with recovery at home.    PCP: Latesha Dias MD  Prefers early  afternoons around 1pm    Extended Emergency Contact Information  Primary Emergency Contact: MINE LOZADA  Mobile Phone: [phone]  Relation: Grandchild  Secondary Emergency Contact: Kelvin Lozada  Mobile Phone: [phone]  Relation: Son  Preferred language: English   needed? No     [id number] [place] - [address]  [address]  [place] [address]  Phone: [phone] Fax: [phone]     Payor: [employer] MANAGED MEDICARE / Plan: [employer] CHOICES 65 / Product Type: Medicare Advantage /

## 2020-11-27 NOTE — PLAN OF CARE
11/27/20 1453   Post-Acute Status   Post-Acute Authorization Other  (Home with family with cardiology follow-up)   Post-Acute Placement Status Set-up Complete   Other Status No Post-Acute Service Needs   Discharge Delays None known at this time   Discharge Plan   Discharge Plan A Home with family   Discharge Plan B Home with family

## 2020-11-27 NOTE — PROGRESS NOTES
[facility]  Cardiology  Progress Note    Patient Name: Cleopatra Lozada  MRN: [id number]  Admission Date: 11/26/2020  Hospital Length of Stay: 0 days  Code Status: Full Code   Attending Physician: Josi Kelly MD   Primary Care Physician: Latesha Dias MD  Expected Discharge Date:   Principal Problem:Troponin I above reference range    Subjective:     Hospital Course:   11/27/20 Denies CP. No further SVT. Troponin 1.3 flat pattern. Echo pending    Interval History:     Review of Systems   Constitution: Negative for decreased appetite.   HENT: Negative for ear discharge.    Eyes: Negative for blurred vision.   Respiratory: Negative for hemoptysis.    Endocrine: Negative for polyphagia.   Hematologic/Lymphatic: Negative for adenopathy.   Skin: Negative for color change.   Musculoskeletal: Negative for joint swelling.   Genitourinary: Negative for bladder incontinence.   Neurological: Negative for brief paralysis.   Psychiatric/Behavioral: Negative for hallucinations.   Allergic/Immunologic: Negative for hives.     Objective:     Vital Signs (Most Recent):  Temp: 98.1 °F (36.7 °C) (11/27/20 0752)  Pulse: 61 (11/27/20 0752)  Resp: 16 (11/27/20 0752)  BP: 136/65 (11/27/20 0752)  SpO2: 95 % (11/27/20 0752) Vital Signs (24h Range):  Temp:  [97.9 °F (36.6 °C)-98.3 °F (36.8 °C)] 98.1 °F (36.7 °C)  Pulse:  [60-94] 61  Resp:  [9-32] 16  SpO2:  [93 %-100 %] 95 %  BP: (106-204)/() 136/65     Weight: 54.2 kg (119 lb 7.8 oz)  Body mass index is 23.34 kg/m².     SpO2: 95 %  O2 Device (Oxygen Therapy): room air      Intake/Output Summary (Last 24 hours) at 11/27/2020 0757  Last data filed at 11/26/2020 1516  Gross per 24 hour   Intake 120 ml   Output --   Net 120 ml       Lines/Drains/Airways     Peripheral Intravenous Line                 Peripheral IV - Single Lumen 11/26/20 0940 20 G Right Antecubital less than 1 day                Physical Exam   Constitutional: She is oriented to person, place,  and time. She appears well-developed and well-nourished.   HENT:   Head: Normocephalic and atraumatic.   Eyes: Pupils are equal, round, and reactive to light. Conjunctivae are normal.   Neck: Normal range of motion. Neck supple.   Cardiovascular: Normal rate, normal heart sounds and intact distal pulses.   Pulmonary/Chest: Effort normal and breath sounds normal.   Abdominal: Soft. Bowel sounds are normal.   Musculoskeletal: Normal range of motion.   Neurological: She is alert and oriented to person, place, and time.   Skin: Skin is warm and dry.       Significant Labs: All pertinent lab results from the last 24 hours have been reviewed.    Significant Imaging: Echocardiogram:   2D echo with color flow doppler:   Results for orders placed or performed during the hospital encounter of 03/25/15   2D Echo w/ Color Flow Doppler   Result Value Ref Range    QEF 55 55 - 65    Diastolic Dysfunction No     Aortic Valve Regurgitation MILD     Est. PA Systolic Pressure 25.66     Narrative    TEST DESCRIPTION       Aorta: The aortic root is normal in size, measuring 2.8 cm at sinotubular junction and 3.1 cm at Sinuses of Valsalva. The proximal ascending aorta is normal in size, measuring 3.2 cm across.     Left Atrium: The left atrial volume index is normal, measuring 14.95 cc/m2.     Left Ventricle: The left ventricle is normal in size, with an end-diastolic diameter of 3.8 cm, and an end-systolic diameter of 2.6 cm. LV wall thickness is normal, with the septum and the posterior wall each measuring 0.8 cm across. Relative wall   thickness was normal at 0.42, and the LV mass index was 60.1 g/m2 consistent with normal left ventricular mass. Global left ventricular systolic function appears normal. Visually estimated ejection fraction is 55-60%. The LV Doppler derived stroke volume   equals 75.0 ccs; at a measured heart rate of 60 bpm this results in a LV Doppler derived cardiac output of 4.5 L/min (CI = 2.90 L/min/m2).   There is  normal systolic/diastolic flow in the pulmonary vein indicating normal left atrial pressures. The E/e'(lat) is 10, consistent with normal diastolic function.     Right Atrium: The right atrium is normal in size, measuring 3.6 cm in length and 2.2 cm in width in the apical view.     Right Ventricle: The right ventricle is normal in size. Global right ventricular systolic function appears normal. Tricuspid annular plane systolic excursion (TAPSE) is 2.0 cm. The estimated PA systolic pressure is 26 mmHg.     Aortic Valve:  The aortic valve is mildly sclerotic. Additionally, there is mild aortic regurgitation.     Mitral Valve:  The mitral valve is normal in structure. There is mitral annular calcification.     Tricuspid Valve:  The tricuspid valve is normal in structure.     Pulmonary Valve:  The pulmonic valve is normal in structure.     IVC: IVC is normal in size and collapses > 50% with a sniff, suggesting normal right atrial pressure of 3 mmHg.     Intracavitary: There is no evidence of pericardial effusion, intracavity mass, thrombi, or vegetation.         This document has been electronically    SIGNED BY: Milo Painting MD On: 03/27/2015 12:00     Assessment and Plan:     Brief HPI:     * Troponin I above reference range  Hypertension.  SVT.  Trend.  Baseline EKG nonischemic.  Patient denies symptoms.  No chest pain or shortness of breath.  Check echocardiogram.  She is on aspirin and Plavix at home.  Continue her statin.  Continue her home blood pressure regimen.    11/27/20 Denies CP. Troponin flat pattern - suspect demand ischemia and not ACS. If echo stable then ok for out patient stress test once able to raise arm above her head    Aortic stenosis  Mild by echocardiogram last year.    Hyperlipidemia  Continue statin.    SVT (supraventricular tachycardia)  Episode emergency room appears to be SVT with aberrancy.  Triggered by PVC.  Do not feel it is ventricular tachycardia.  Titrate beta-blocker as  tolerated.    Essential hypertension  Patient did not take her medications this morning.  Restart.  Monitor.        VTE Risk Mitigation (From admission, onward)         Ordered     IP VTE HIGH RISK PATIENT  Once      11/26/20 1247     Place sequential compression device  Until discontinued      11/26/20 1247     Place KIERSTEN hose  Until discontinued      11/26/20 1247                Bryn Payne MD  Cardiology  [facility]

## 2020-11-27 NOTE — PLAN OF CARE
Problem: Fall Injury Risk  Goal: Absence of Fall and Fall-Related Injury  Outcome: Ongoing, Progressing     Problem: Adult Inpatient Plan of Care  Goal: Plan of Care Review  Outcome: Ongoing, Progressing  Goal: Patient-Specific Goal (Individualization)  Outcome: Ongoing, Progressing  Goal: Absence of Hospital-Acquired Illness or Injury  Outcome: Ongoing, Progressing  Goal: Optimal Comfort and Wellbeing  Outcome: Ongoing, Progressing  Goal: Readiness for Transition of Care  Outcome: Ongoing, Progressing  Goal: Rounds/Family Conference  Outcome: Ongoing, Progressing     Problem: Asthma Comorbidity  Goal: Maintenance of Asthma Control  Outcome: Ongoing, Progressing     Problem: COPD Comorbidity  Goal: Maintenance of COPD Symptom Control  Outcome: Ongoing, Progressing     Problem: Heart Failure Comorbidity  Goal: Maintenance of Heart Failure Symptom Control  Outcome: Ongoing, Progressing     Problem: Hypertension Comorbidity  Goal: Blood Pressure in Desired Range  Outcome: Ongoing, Progressing     Problem: Obstructive Sleep Apnea Risk or Actual (Comorbidity Management)  Goal: Unobstructed Breathing During Sleep  Outcome: Ongoing, Progressing     Problem: Pain Chronic (Persistent) (Comorbidity Management)  Goal: Acceptable Pain Control and Functional Ability  Outcome: Ongoing, Progressing     Problem: Seizure Disorder Comorbidity  Goal: Maintenance of Seizure Control  Outcome: Ongoing, Progressing

## 2020-11-27 NOTE — ASSESSMENT & PLAN NOTE
Hypertension.  SVT.  Trend.  Baseline EKG nonischemic.  Patient denies symptoms.  No chest pain or shortness of breath.  Check echocardiogram.  She is on aspirin and Plavix at home.  Continue her statin.  Continue her home blood pressure regimen.    11/27/20 Denies CP. Troponin flat pattern - suspect demand ischemia and not ACS. If echo stable then ok for out patient stress test once able to raise arm above her head

## 2020-11-27 NOTE — SUBJECTIVE & OBJECTIVE
Interval History:     Review of Systems   Constitution: Negative for decreased appetite.   HENT: Negative for ear discharge.    Eyes: Negative for blurred vision.   Respiratory: Negative for hemoptysis.    Endocrine: Negative for polyphagia.   Hematologic/Lymphatic: Negative for adenopathy.   Skin: Negative for color change.   Musculoskeletal: Negative for joint swelling.   Genitourinary: Negative for bladder incontinence.   Neurological: Negative for brief paralysis.   Psychiatric/Behavioral: Negative for hallucinations.   Allergic/Immunologic: Negative for hives.     Objective:     Vital Signs (Most Recent):  Temp: 98.1 °F (36.7 °C) (11/27/20 0752)  Pulse: 61 (11/27/20 0752)  Resp: 16 (11/27/20 0752)  BP: 136/65 (11/27/20 0752)  SpO2: 95 % (11/27/20 0752) Vital Signs (24h Range):  Temp:  [97.9 °F (36.6 °C)-98.3 °F (36.8 °C)] 98.1 °F (36.7 °C)  Pulse:  [60-94] 61  Resp:  [9-32] 16  SpO2:  [93 %-100 %] 95 %  BP: (106-204)/() 136/65     Weight: 54.2 kg (119 lb 7.8 oz)  Body mass index is 23.34 kg/m².     SpO2: 95 %  O2 Device (Oxygen Therapy): room air      Intake/Output Summary (Last 24 hours) at 11/27/2020 0757  Last data filed at 11/26/2020 1516  Gross per 24 hour   Intake 120 ml   Output --   Net 120 ml       Lines/Drains/Airways     Peripheral Intravenous Line                 Peripheral IV - Single Lumen 11/26/20 0940 20 G Right Antecubital less than 1 day                Physical Exam   Constitutional: She is oriented to person, place, and time. She appears well-developed and well-nourished.   HENT:   Head: Normocephalic and atraumatic.   Eyes: Pupils are equal, round, and reactive to light. Conjunctivae are normal.   Neck: Normal range of motion. Neck supple.   Cardiovascular: Normal rate, normal heart sounds and intact distal pulses.   Pulmonary/Chest: Effort normal and breath sounds normal.   Abdominal: Soft. Bowel sounds are normal.   Musculoskeletal: Normal range of motion.   Neurological: She is  alert and oriented to person, place, and time.   Skin: Skin is warm and dry.       Significant Labs: All pertinent lab results from the last 24 hours have been reviewed.    Significant Imaging: Echocardiogram:   2D echo with color flow doppler:   Results for orders placed or performed during the hospital encounter of 03/25/15   2D Echo w/ Color Flow Doppler   Result Value Ref Range    QEF 55 55 - 65    Diastolic Dysfunction No     Aortic Valve Regurgitation MILD     Est. PA Systolic Pressure 25.66     Narrative    TEST DESCRIPTION       Aorta: The aortic root is normal in size, measuring 2.8 cm at sinotubular junction and 3.1 cm at Sinuses of Valsalva. The proximal ascending aorta is normal in size, measuring 3.2 cm across.     Left Atrium: The left atrial volume index is normal, measuring 14.95 cc/m2.     Left Ventricle: The left ventricle is normal in size, with an end-diastolic diameter of 3.8 cm, and an end-systolic diameter of 2.6 cm. LV wall thickness is normal, with the septum and the posterior wall each measuring 0.8 cm across. Relative wall   thickness was normal at 0.42, and the LV mass index was 60.1 g/m2 consistent with normal left ventricular mass. Global left ventricular systolic function appears normal. Visually estimated ejection fraction is 55-60%. The LV Doppler derived stroke volume   equals 75.0 ccs; at a measured heart rate of 60 bpm this results in a LV Doppler derived cardiac output of 4.5 L/min (CI = 2.90 L/min/m2).   There is normal systolic/diastolic flow in the pulmonary vein indicating normal left atrial pressures. The E/e'(lat) is 10, consistent with normal diastolic function.     Right Atrium: The right atrium is normal in size, measuring 3.6 cm in length and 2.2 cm in width in the apical view.     Right Ventricle: The right ventricle is normal in size. Global right ventricular systolic function appears normal. Tricuspid annular plane systolic excursion (TAPSE) is 2.0 cm. The estimated  PA systolic pressure is 26 mmHg.     Aortic Valve:  The aortic valve is mildly sclerotic. Additionally, there is mild aortic regurgitation.     Mitral Valve:  The mitral valve is normal in structure. There is mitral annular calcification.     Tricuspid Valve:  The tricuspid valve is normal in structure.     Pulmonary Valve:  The pulmonic valve is normal in structure.     IVC: IVC is normal in size and collapses > 50% with a sniff, suggesting normal right atrial pressure of 3 mmHg.     Intracavitary: There is no evidence of pericardial effusion, intracavity mass, thrombi, or vegetation.         This document has been electronically    SIGNED BY: Milo Painting MD On: 03/27/2015 12:00

## 2020-11-27 NOTE — NURSING
Gave patient discharge instructions, verbalized understanding. Grandson/[phone] is downstairs waiting. Patient escorted via wheelchair with nursing assistant. No distress noted

## 2020-11-27 NOTE — NURSING
Report given to receiving nurse Allyn. Pt awake in bed. Walking rounds completed. Pt assessed, NAD noted.     24hr chart check completed.

## 2020-11-28 NOTE — HOSPITAL COURSE
Mrs. Lozada is a 79 yo female placed in observation for SVT in ED. Patient initially came for left shoulder dislocation. Denies any chest pain or SOB. Troponin flat pattern. 2 D echo EF normal, grade I DD, mild atrial enlargement, mild AS and no WMA , Cardiology okay for outpatient stress test. Patient does not want to stay and ready to go home. Patient stable to go home. Follow up Cardiology Dr. Payne 12/4/20.

## 2020-11-28 NOTE — DISCHARGE SUMMARY
"[facility] Hospital Medicine  Discharge Summary      Patient Name: Cleopatra Lozada  MRN: [id number]  Admission Date: 11/26/2020  Hospital Length of Stay: 0 days  Discharge Date and Time: 11/27/20  Attending Physician: Josi Kelly MD  Discharging Provider: Michelle Castillo NP  Primary Care Provider: Latesha Dias MD      HPI:   Cleopatra Lozada 80 y.o. female with history of CVA, CKD, HTN, HLD presents to the hospital with a chief complaint of shoulder pain. This morning when attempting to life her arm over her head she dislocated her left shoulder. She reports this is a fequent occurrence. She follows with physician outpt. She typically comes to the ED to have the shoulder reduced and is discharged.     While in the ED she was found to have SVT with a troponin of 1.3. She reports she had a "brief moment of chest pain" she is unable to describe the pain as it occurred so quickly. She is unsure of any aggravating or alleviating factors. She feels well currently and would prefer discharge. She no longer had pain in her shoulder. She denies fever, SOB, N/V, abdominal pain, dizziness, dysuria, numbness in her left arm, syncope.     In the ED, initial xray with anterior shoulder dislocation, repeat x-ray after reduction shows left humeral head appropriately positioned related to glenoid, SVT seen on monitor, troponin 1.3, and COVID negative.    * No surgery found *      Hospital Course:   Mrs. Lozada is a 81 yo female placed in observation for SVT in ED. Patient initially came for left shoulder dislocation. Denies any chest pain or SOB. Troponin flat pattern. 2 D echo EF normal, grade I DD, mild atrial enlargement, mild AS and no WMA , Cardiology okay for outpatient stress test. Patient does not want to stay and ready to go home. Patient stable to go home. Follow up Cardiology Dr. Payne 12/4/20.     Consults:   Consults (From admission, onward)        Status Ordering Provider     " Inpatient consult to Cardiology  Once     Provider:  Maico Trevino MD    Completed MANUEL BRADLEY consult to case management  Once     Provider:  (Not yet assigned)    Completed YENNY JARVIS          No new Assessment & Plan notes have been filed under this hospital service since the last note was generated.  Service: Hospital Medicine    Final Active Diagnoses:    Diagnosis Date Noted POA    PRINCIPAL PROBLEM:  Troponin I above reference range [R77.8] 11/26/2020 Unknown    SVT (supraventricular tachycardia) [I47.1] 11/26/2020 No    Hyperlipidemia [E78.5] 11/26/2020 Yes    Aortic stenosis [I35.0] 11/26/2020 Yes    Essential hypertension [I10] 04/02/2019 Yes     Chronic    CKD (chronic kidney disease) stage 3, GFR 30-59 ml/min [N18.30] 04/02/2019 Yes     Chronic      Problems Resolved During this Admission:       Discharged Condition: stable    Disposition: Home or Self Care    Follow Up:  Follow-up Information     Bryn Payne MD. Go on 12/4/2020.    Specialty: Cardiology  Why: @ 1:30pm  Out-Patient Cardiology Hospital Follow-Up scheduled with Dr. Payne.  Contact information:  [address]  [address]  [facility] [address]  [phone]                 Patient Instructions:      Diet Cardiac     Notify your health care provider if you experience any of the following:  difficulty breathing or increased cough     Notify your health care provider if you experience any of the following:  temperature >100.4     Notify your health care provider if you experience any of the following:  increased confusion or weakness     Activity as tolerated       Significant Diagnostic Studies: Labs: All labs within the past 24 hours have been reviewed    Pending Diagnostic Studies:     None         Medications:  Reconciled Home Medications:      Medication List      CONTINUE taking these medications    aspirin 81 MG EC tablet  Commonly known as: ECOTRIN  Take 1 tablet (81 mg total) by mouth once daily.      atorvastatin 80 MG tablet  Commonly known as: LIPITOR  Take 1 tablet (80 mg total) by mouth once daily.     clopidogreL 75 mg tablet  Commonly known as: PLAVIX  Take 1 tablet (75 mg total) by mouth once daily.     HYDROcodone-acetaminophen 5-325 mg per tablet  Commonly known as: NORCO  Take 1 tablet by mouth every 6 (six) hours as needed.     losartan 50 MG tablet  Commonly known as: COZAAR  Take 1 tablet (50 mg total) by mouth once daily.     metoprolol succinate 25 MG 24 hr tablet  Commonly known as: TOPROL-XL  Take 1 tablet (25 mg total) by mouth once daily.            Indwelling Lines/Drains at time of discharge:   Lines/Drains/Airways     None                 Time spent on the discharge of patient: 30 minutes  Patient was seen and examined on the date of discharge and determined to be suitable for discharge.         Michelle Castillo NP  Department of Hospital Medicine  [facility]

## 2020-12-17 ENCOUNTER — OFFICE VISIT (OUTPATIENT)
Dept: CARDIOLOGY | Facility: CLINIC | Age: 80
End: 2020-12-17
Payer: MEDICARE

## 2020-12-17 VITALS
DIASTOLIC BLOOD PRESSURE: 64 MMHG | HEIGHT: 60 IN | BODY MASS INDEX: 23.98 KG/M2 | SYSTOLIC BLOOD PRESSURE: 120 MMHG | HEART RATE: 86 BPM | OXYGEN SATURATION: 99 % | WEIGHT: 122.13 LBS

## 2020-12-17 DIAGNOSIS — I47.10 SVT (SUPRAVENTRICULAR TACHYCARDIA): ICD-10-CM

## 2020-12-17 DIAGNOSIS — R07.9 CHEST PAIN, UNSPECIFIED TYPE: ICD-10-CM

## 2020-12-17 DIAGNOSIS — I71.40 ABDOMINAL AORTIC ANEURYSM (AAA) WITHOUT RUPTURE: Chronic | ICD-10-CM

## 2020-12-17 DIAGNOSIS — Z98.890 H/O CAROTID ENDARTERECTOMY: ICD-10-CM

## 2020-12-17 DIAGNOSIS — I35.0 AORTIC VALVE STENOSIS, ETIOLOGY OF CARDIAC VALVE DISEASE UNSPECIFIED: ICD-10-CM

## 2020-12-17 DIAGNOSIS — I63.9 ACUTE CVA (CEREBROVASCULAR ACCIDENT): Primary | ICD-10-CM

## 2020-12-17 DIAGNOSIS — E78.5 HYPERLIPIDEMIA, UNSPECIFIED HYPERLIPIDEMIA TYPE: ICD-10-CM

## 2020-12-17 DIAGNOSIS — I10 ESSENTIAL HYPERTENSION: Chronic | ICD-10-CM

## 2020-12-17 DIAGNOSIS — R79.89 ELEVATED TROPONIN: ICD-10-CM

## 2020-12-17 PROCEDURE — 1126F PR PAIN SEVERITY QUANTIFIED, NO PAIN PRESENT: ICD-10-PCS | Mod: S$GLB,,, | Performed by: INTERNAL MEDICINE

## 2020-12-17 PROCEDURE — 99999 PR PBB SHADOW E&M-EST. PATIENT-LVL III: ICD-10-PCS | Mod: PBBFAC,,, | Performed by: INTERNAL MEDICINE

## 2020-12-17 PROCEDURE — 1159F MED LIST DOCD IN RCRD: CPT | Mod: S$GLB,,, | Performed by: INTERNAL MEDICINE

## 2020-12-17 PROCEDURE — 99999 PR PBB SHADOW E&M-EST. PATIENT-LVL III: CPT | Mod: PBBFAC,,, | Performed by: INTERNAL MEDICINE

## 2020-12-17 PROCEDURE — 1126F AMNT PAIN NOTED NONE PRSNT: CPT | Mod: S$GLB,,, | Performed by: INTERNAL MEDICINE

## 2020-12-17 PROCEDURE — 3288F PR FALLS RISK ASSESSMENT DOCUMENTED: ICD-10-PCS | Mod: CPTII,S$GLB,, | Performed by: INTERNAL MEDICINE

## 2020-12-17 PROCEDURE — 1100F PR PT FALLS ASSESS DOC 2+ FALLS/FALL W/INJURY/YR: ICD-10-PCS | Mod: CPTII,S$GLB,, | Performed by: INTERNAL MEDICINE

## 2020-12-17 PROCEDURE — 1100F PTFALLS ASSESS-DOCD GE2>/YR: CPT | Mod: CPTII,S$GLB,, | Performed by: INTERNAL MEDICINE

## 2020-12-17 PROCEDURE — 3288F FALL RISK ASSESSMENT DOCD: CPT | Mod: CPTII,S$GLB,, | Performed by: INTERNAL MEDICINE

## 2020-12-17 PROCEDURE — 1159F PR MEDICATION LIST DOCUMENTED IN MEDICAL RECORD: ICD-10-PCS | Mod: S$GLB,,, | Performed by: INTERNAL MEDICINE

## 2020-12-17 PROCEDURE — 99214 OFFICE O/P EST MOD 30 MIN: CPT | Mod: S$GLB,,, | Performed by: INTERNAL MEDICINE

## 2020-12-17 PROCEDURE — 99214 PR OFFICE/OUTPT VISIT, EST, LEVL IV, 30-39 MIN: ICD-10-PCS | Mod: S$GLB,,, | Performed by: INTERNAL MEDICINE

## 2020-12-17 NOTE — PROGRESS NOTES
"Subjective:    Patient ID:  Cleopatra Lozada is a 80 y.o. female who presents for follow-up of Hospital Follow Up      HPI     SVT, mild AS, CVA, HTN, HLD    Admitted 11/26/20  Cleopatra Lozada 80 y.o. female with history of CVA, CKD, HTN, HLD presents to the hospital with a chief complaint of shoulder pain. This morning when attempting to life her arm over her head she dislocated her left shoulder. She reports this is a fequent occurrence. She follows with physician outpt. She typically comes to the ED to have the shoulder reduced and is discharged.      While in the ED she was found to have SVT with a troponin of 1.3. She reports she had a "brief moment of chest pain" she is unable to describe the pain as it occurred so quickly. She is unsure of any aggravating or alleviating factors. She feels well currently and would prefer discharge. She no longer had pain in her shoulder. She denies fever, SOB, N/V, abdominal pain, dizziness, dysuria, numbness in her left arm, syncope.      In the ED, initial xray with anterior shoulder dislocation, repeat x-ray after reduction shows left humeral head appropriately positioned related to glenoid, SVT seen on monitor, troponin 1.3, and COVID negative.    Mrs. Lozada is a 81 yo female placed in observation for SVT in ED. Patient initially came for left shoulder dislocation. Denies any chest pain or SOB. Troponin flat pattern. 2 D echo EF normal, grade I DD, mild atrial enlargement, mild AS and no WMA , Cardiology okay for outpatient stress test. Patient does not want to stay and ready to go home. Patient stable to go home. Follow up Cardiology Dr. Payne 12/4/20.      Echo 11/27/20  · The left ventricle is normal in size with normal systolic function. The estimated ejection fraction is 60%.  · There is left ventricular concentric hypertrophy.  · Grade I diastolic dysfunction.  · Normal right ventricular size with normal right ventricular systolic function.  · Mild left atrial " enlargement.  · Mild aortic regurgitation.  · Mild aortic valve stenosis.  · Aortic valve area is 1.37 cm2; peak velocity is 2.81 m/s; mean gradient is 16 mmHg.  · Normal central venous pressure (3 mmHg).  · The estimated PA systolic pressure is 36 mmHg.     Stress test 4/3/19  · Normal myocardial perfusion scan  · Arrhythmias during stress: PACs.ISOLATED PACs  · There was no ST segment deviation noted during stress.  · The patient reported light headedness and abdominal pain during the stress test.  · The perfusion scan is free of evidence from myocardial ischemia or injury.  · LVEF post-stress is 78%  · The EKG portion of this study is negative for myocardial ischemia.    12/17/20 Feels better since discharge. Denies heart racing, CP, or SOB    Review of Systems   Constitution: Negative for decreased appetite.   HENT: Negative for ear discharge.    Eyes: Negative for blurred vision.   Respiratory: Negative for hemoptysis.    Endocrine: Negative for polyphagia.   Hematologic/Lymphatic: Negative for adenopathy.   Skin: Negative for color change.   Musculoskeletal: Negative for joint swelling.   Genitourinary: Negative for bladder incontinence.   Neurological: Negative for brief paralysis.   Psychiatric/Behavioral: Negative for hallucinations.   Allergic/Immunologic: Negative for hives.        Objective:    Physical Exam   Constitutional: She is oriented to person, place, and time. She appears well-developed and well-nourished.   HENT:   Head: Normocephalic and atraumatic.   Eyes: Pupils are equal, round, and reactive to light. Conjunctivae are normal.   Neck: Normal range of motion. Neck supple.   Cardiovascular: Normal rate, normal heart sounds and intact distal pulses.   Pulmonary/Chest: Effort normal and breath sounds normal.   Abdominal: Soft. Bowel sounds are normal.   Musculoskeletal: Normal range of motion.   Neurological: She is alert and oriented to person, place, and time.   Skin: Skin is warm and dry.          Assessment:       1. Acute CVA (cerebrovascular accident)    2. SVT (supraventricular tachycardia)    3. Essential hypertension    4. Abdominal aortic aneurysm (AAA) without rupture    5. H/O carotid endarterectomy    6. Aortic valve stenosis, etiology of cardiac valve disease unspecified    7. Chest pain, unspecified type    8. Elevated troponin    9. Hyperlipidemia, unspecified hyperlipidemia type         Plan:       Continue Rx for SVT, HTN, AS  OV 1 month with lexiscan myoview for recent elevated troponin with SVT

## 2021-01-21 ENCOUNTER — HOSPITAL ENCOUNTER (OUTPATIENT)
Dept: RADIOLOGY | Facility: HOSPITAL | Age: 81
Discharge: HOME | End: 2021-01-21
Attending: INTERNAL MEDICINE
Payer: MEDICARE

## 2021-01-21 DIAGNOSIS — E78.5 HYPERLIPIDEMIA, UNSPECIFIED HYPERLIPIDEMIA TYPE: ICD-10-CM

## 2021-01-21 DIAGNOSIS — I10 ESSENTIAL HYPERTENSION: Chronic | ICD-10-CM

## 2021-01-21 DIAGNOSIS — R79.89 ELEVATED TROPONIN: ICD-10-CM

## 2021-01-21 DIAGNOSIS — I63.9 ACUTE CVA (CEREBROVASCULAR ACCIDENT): ICD-10-CM

## 2021-01-21 DIAGNOSIS — R07.9 CHEST PAIN, UNSPECIFIED TYPE: ICD-10-CM

## 2021-01-21 DIAGNOSIS — I71.40 ABDOMINAL AORTIC ANEURYSM (AAA) WITHOUT RUPTURE: Chronic | ICD-10-CM

## 2021-01-21 DIAGNOSIS — I47.10 SVT (SUPRAVENTRICULAR TACHYCARDIA): ICD-10-CM

## 2021-01-21 DIAGNOSIS — I35.0 AORTIC VALVE STENOSIS, ETIOLOGY OF CARDIAC VALVE DISEASE UNSPECIFIED: ICD-10-CM

## 2021-01-21 DIAGNOSIS — Z98.890 H/O CAROTID ENDARTERECTOMY: ICD-10-CM

## 2021-02-24 ENCOUNTER — HOSPITAL ENCOUNTER (EMERGENCY)
Facility: HOSPITAL | Age: 81
Discharge: HOME | End: 2021-02-24
Attending: EMERGENCY MEDICINE
Payer: MEDICARE

## 2021-02-24 ENCOUNTER — TELEPHONE (OUTPATIENT)
Dept: EMERGENCY MEDICINE | Facility: HOSPITAL | Age: 81
End: 2021-02-24

## 2021-02-24 VITALS
RESPIRATION RATE: 19 BRPM | WEIGHT: 120 LBS | TEMPERATURE: 99 F | HEIGHT: 61 IN | OXYGEN SATURATION: 95 % | DIASTOLIC BLOOD PRESSURE: 67 MMHG | BODY MASS INDEX: 22.66 KG/M2 | HEART RATE: 60 BPM | SYSTOLIC BLOOD PRESSURE: 139 MMHG

## 2021-02-24 DIAGNOSIS — S43.006A SHOULDER DISLOCATION: ICD-10-CM

## 2021-02-24 DIAGNOSIS — M25.512 LEFT SHOULDER PAIN: ICD-10-CM

## 2021-02-24 DIAGNOSIS — S43.005A DISLOCATION OF LEFT SHOULDER JOINT, INITIAL ENCOUNTER: Primary | ICD-10-CM

## 2021-02-24 DIAGNOSIS — M79.602 LEFT ARM PAIN: ICD-10-CM

## 2021-02-24 PROCEDURE — 63600175 PHARM REV CODE 636 W HCPCS: Performed by: EMERGENCY MEDICINE

## 2021-02-24 PROCEDURE — 23655 CLTX SHO DSLC W/MNPJ W/ANES: CPT

## 2021-02-24 PROCEDURE — 96361 HYDRATE IV INFUSION ADD-ON: CPT

## 2021-02-24 PROCEDURE — 93010 EKG 12-LEAD: ICD-10-PCS | Mod: ,,, | Performed by: INTERNAL MEDICINE

## 2021-02-24 PROCEDURE — 93005 ELECTROCARDIOGRAM TRACING: CPT | Mod: 59

## 2021-02-24 PROCEDURE — 25000003 PHARM REV CODE 250: Performed by: EMERGENCY MEDICINE

## 2021-02-24 PROCEDURE — 96375 TX/PRO/DX INJ NEW DRUG ADDON: CPT

## 2021-02-24 PROCEDURE — 96374 THER/PROPH/DIAG INJ IV PUSH: CPT | Mod: 59

## 2021-02-24 PROCEDURE — 93010 ELECTROCARDIOGRAM REPORT: CPT | Mod: ,,, | Performed by: INTERNAL MEDICINE

## 2021-02-24 PROCEDURE — 99284 EMERGENCY DEPT VISIT MOD MDM: CPT | Mod: 25

## 2021-02-24 RX ORDER — SODIUM CHLORIDE 9 MG/ML
INJECTION, SOLUTION INTRAVENOUS
Status: COMPLETED | OUTPATIENT
Start: 2021-02-24 | End: 2021-02-24

## 2021-02-24 RX ORDER — PROPOFOL 10 MG/ML
100 VIAL (ML) INTRAVENOUS ONCE
Status: COMPLETED | OUTPATIENT
Start: 2021-02-24 | End: 2021-02-24

## 2021-02-24 RX ORDER — FENTANYL CITRATE 50 UG/ML
50 INJECTION, SOLUTION INTRAMUSCULAR; INTRAVENOUS
Status: COMPLETED | OUTPATIENT
Start: 2021-02-24 | End: 2021-02-24

## 2021-02-24 RX ORDER — LORAZEPAM 2 MG/ML
0.5 INJECTION INTRAMUSCULAR
Status: COMPLETED | OUTPATIENT
Start: 2021-02-24 | End: 2021-02-24

## 2021-02-24 RX ADMIN — PROPOFOL 40 MG: 10 INJECTION, EMULSION INTRAVENOUS at 11:02

## 2021-02-24 RX ADMIN — LORAZEPAM 0.5 MG: 2 INJECTION INTRAMUSCULAR; INTRAVENOUS at 10:02

## 2021-02-24 RX ADMIN — SODIUM CHLORIDE: 0.9 INJECTION, SOLUTION INTRAVENOUS at 10:02

## 2021-02-24 RX ADMIN — FENTANYL CITRATE 50 MCG: 50 INJECTION, SOLUTION INTRAMUSCULAR; INTRAVENOUS at 10:02

## 2021-03-01 ENCOUNTER — ANESTHESIA (OUTPATIENT)
Dept: ANESTHESIOLOGY | Facility: HOSPITAL | Age: 81
End: 2021-03-01
Payer: MEDICARE

## 2021-03-01 ENCOUNTER — ANESTHESIA EVENT (OUTPATIENT)
Dept: ANESTHESIOLOGY | Facility: HOSPITAL | Age: 81
End: 2021-03-01
Payer: MEDICARE

## 2021-03-01 ENCOUNTER — HOSPITAL ENCOUNTER (EMERGENCY)
Facility: HOSPITAL | Age: 81
Discharge: HOME | End: 2021-03-02
Attending: EMERGENCY MEDICINE
Payer: MEDICARE

## 2021-03-01 DIAGNOSIS — Z87.39 HISTORY OF DISLOCATION: ICD-10-CM

## 2021-03-01 DIAGNOSIS — S42.92XA TRAUMATIC CLOSED DISPLACED FRACTURE OF LEFT SHOULDER WITH ANTERIOR DISLOCATION, INITIAL ENCOUNTER: Primary | ICD-10-CM

## 2021-03-01 DIAGNOSIS — M25.512 ACUTE PAIN OF LEFT SHOULDER: ICD-10-CM

## 2021-03-01 PROCEDURE — 63600175 PHARM REV CODE 636 W HCPCS: Performed by: EMERGENCY MEDICINE

## 2021-03-01 PROCEDURE — D9220A PRA ANESTHESIA: ICD-10-PCS | Mod: ,,, | Performed by: ANESTHESIOLOGY

## 2021-03-01 PROCEDURE — 37000009 HC ANESTHESIA EA ADD 15 MINS: Performed by: ANESTHESIOLOGY

## 2021-03-01 PROCEDURE — 37000008 HC ANESTHESIA 1ST 15 MINUTES: Performed by: ANESTHESIOLOGY

## 2021-03-01 PROCEDURE — 96374 THER/PROPH/DIAG INJ IV PUSH: CPT | Mod: 59

## 2021-03-01 PROCEDURE — 23650 CLTX SHO DSLC W/MNPJ WO ANES: CPT | Mod: LT

## 2021-03-01 PROCEDURE — 99285 EMERGENCY DEPT VISIT HI MDM: CPT | Mod: 25

## 2021-03-01 PROCEDURE — D9220A PRA ANESTHESIA: Mod: ,,, | Performed by: ANESTHESIOLOGY

## 2021-03-01 PROCEDURE — 63600175 PHARM REV CODE 636 W HCPCS: Performed by: ANESTHESIOLOGY

## 2021-03-01 PROCEDURE — 25000003 PHARM REV CODE 250: Performed by: ANESTHESIOLOGY

## 2021-03-01 RX ORDER — FENTANYL CITRATE 50 UG/ML
50 INJECTION, SOLUTION INTRAMUSCULAR; INTRAVENOUS
Status: COMPLETED | OUTPATIENT
Start: 2021-03-01 | End: 2021-03-01

## 2021-03-01 RX ORDER — LIDOCAINE HYDROCHLORIDE 20 MG/ML
INJECTION INTRAVENOUS
Status: DISCONTINUED | OUTPATIENT
Start: 2021-03-01 | End: 2021-03-01

## 2021-03-01 RX ORDER — PROPOFOL 10 MG/ML
VIAL (ML) INTRAVENOUS
Status: DISCONTINUED | OUTPATIENT
Start: 2021-03-01 | End: 2021-03-01

## 2021-03-01 RX ADMIN — Medication 80 MG: at 10:03

## 2021-03-01 RX ADMIN — FENTANYL CITRATE 50 MCG: 50 INJECTION, SOLUTION INTRAMUSCULAR; INTRAVENOUS at 10:03

## 2021-03-01 RX ADMIN — PROPOFOL 50 MG: 10 INJECTION, EMULSION INTRAVENOUS at 10:03

## 2021-03-02 ENCOUNTER — NURSE TRIAGE (OUTPATIENT)
Dept: ADMINISTRATIVE | Facility: CLINIC | Age: 81
End: 2021-03-02

## 2021-03-02 VITALS
WEIGHT: 120 LBS | BODY MASS INDEX: 22.66 KG/M2 | SYSTOLIC BLOOD PRESSURE: 146 MMHG | HEART RATE: 74 BPM | DIASTOLIC BLOOD PRESSURE: 74 MMHG | OXYGEN SATURATION: 92 % | HEIGHT: 61 IN | RESPIRATION RATE: 18 BRPM | TEMPERATURE: 98 F

## 2021-03-02 RX ORDER — HYDROCODONE BITARTRATE AND ACETAMINOPHEN 5; 325 MG/1; MG/1
1 TABLET ORAL
Status: DISCONTINUED | OUTPATIENT
Start: 2021-03-02 | End: 2021-03-02 | Stop reason: HOSPADM

## 2021-03-02 RX ORDER — HYDROCODONE BITARTRATE AND ACETAMINOPHEN 5; 325 MG/1; MG/1
1 TABLET ORAL EVERY 4 HOURS PRN
Qty: 12 TABLET | Refills: 0 | OUTPATIENT
Start: 2021-03-02 | End: 2021-04-05

## 2021-03-29 ENCOUNTER — HOSPITAL ENCOUNTER (EMERGENCY)
Facility: HOSPITAL | Age: 81
Discharge: HOME | End: 2021-03-29
Attending: EMERGENCY MEDICINE
Payer: MEDICARE

## 2021-03-29 ENCOUNTER — NURSE TRIAGE (OUTPATIENT)
Dept: ADMINISTRATIVE | Facility: CLINIC | Age: 81
End: 2021-03-29

## 2021-03-29 VITALS
SYSTOLIC BLOOD PRESSURE: 170 MMHG | HEART RATE: 93 BPM | OXYGEN SATURATION: 96 % | TEMPERATURE: 98 F | WEIGHT: 120 LBS | RESPIRATION RATE: 16 BRPM | HEIGHT: 60 IN | BODY MASS INDEX: 23.56 KG/M2 | DIASTOLIC BLOOD PRESSURE: 74 MMHG

## 2021-03-29 DIAGNOSIS — M25.519 SHOULDER PAIN: ICD-10-CM

## 2021-03-29 DIAGNOSIS — S43.005A DISLOCATION OF LEFT SHOULDER JOINT, INITIAL ENCOUNTER: Primary | ICD-10-CM

## 2021-03-29 PROCEDURE — 99152 MOD SED SAME PHYS/QHP 5/>YRS: CPT

## 2021-03-29 PROCEDURE — 63600175 PHARM REV CODE 636 W HCPCS: Performed by: EMERGENCY MEDICINE

## 2021-03-29 PROCEDURE — 23650 CLTX SHO DSLC W/MNPJ WO ANES: CPT | Mod: LT

## 2021-03-29 PROCEDURE — 99285 EMERGENCY DEPT VISIT HI MDM: CPT | Mod: 25

## 2021-03-29 PROCEDURE — 96374 THER/PROPH/DIAG INJ IV PUSH: CPT | Mod: 59

## 2021-03-29 RX ORDER — MORPHINE SULFATE 4 MG/ML
4 INJECTION, SOLUTION INTRAMUSCULAR; INTRAVENOUS
Status: COMPLETED | OUTPATIENT
Start: 2021-03-29 | End: 2021-03-29

## 2021-03-29 RX ORDER — PROPOFOL 10 MG/ML
200 VIAL (ML) INTRAVENOUS ONCE
Status: COMPLETED | OUTPATIENT
Start: 2021-03-29 | End: 2021-03-29

## 2021-03-29 RX ADMIN — PROPOFOL 40 MG: 10 INJECTION, EMULSION INTRAVENOUS at 03:03

## 2021-03-29 RX ADMIN — MORPHINE SULFATE 4 MG: 4 INJECTION INTRAVENOUS at 02:03

## 2021-04-05 ENCOUNTER — ANESTHESIA (OUTPATIENT)
Dept: ANESTHESIOLOGY | Facility: HOSPITAL | Age: 81
End: 2021-04-05
Payer: MEDICARE

## 2021-04-05 ENCOUNTER — ANESTHESIA EVENT (OUTPATIENT)
Dept: ANESTHESIOLOGY | Facility: HOSPITAL | Age: 81
End: 2021-04-05
Payer: MEDICARE

## 2021-04-05 ENCOUNTER — HOSPITAL ENCOUNTER (EMERGENCY)
Facility: HOSPITAL | Age: 81
Discharge: HOME | End: 2021-04-05
Attending: EMERGENCY MEDICINE
Payer: MEDICARE

## 2021-04-05 VITALS
SYSTOLIC BLOOD PRESSURE: 148 MMHG | BODY MASS INDEX: 23.56 KG/M2 | TEMPERATURE: 98 F | RESPIRATION RATE: 18 BRPM | DIASTOLIC BLOOD PRESSURE: 90 MMHG | HEART RATE: 67 BPM | WEIGHT: 120 LBS | HEIGHT: 60 IN | OXYGEN SATURATION: 99 %

## 2021-04-05 DIAGNOSIS — S43.015A ANTERIOR DISLOCATION OF LEFT SHOULDER, INITIAL ENCOUNTER: Primary | ICD-10-CM

## 2021-04-05 PROCEDURE — D9220A PRA ANESTHESIA: ICD-10-PCS | Mod: ANES,,, | Performed by: ANESTHESIOLOGY

## 2021-04-05 PROCEDURE — 37000009 HC ANESTHESIA EA ADD 15 MINS

## 2021-04-05 PROCEDURE — 99284 EMERGENCY DEPT VISIT MOD MDM: CPT | Mod: 25

## 2021-04-05 PROCEDURE — 25000003 PHARM REV CODE 250: Performed by: NURSE ANESTHETIST, CERTIFIED REGISTERED

## 2021-04-05 PROCEDURE — 63600175 PHARM REV CODE 636 W HCPCS: Performed by: NURSE ANESTHETIST, CERTIFIED REGISTERED

## 2021-04-05 PROCEDURE — 37000008 HC ANESTHESIA 1ST 15 MINUTES

## 2021-04-05 PROCEDURE — 63600175 PHARM REV CODE 636 W HCPCS: Performed by: EMERGENCY MEDICINE

## 2021-04-05 PROCEDURE — D9220A PRA ANESTHESIA: Mod: ANES,,, | Performed by: ANESTHESIOLOGY

## 2021-04-05 PROCEDURE — 25000003 PHARM REV CODE 250: Performed by: EMERGENCY MEDICINE

## 2021-04-05 PROCEDURE — 96361 HYDRATE IV INFUSION ADD-ON: CPT

## 2021-04-05 PROCEDURE — D9220A PRA ANESTHESIA: Mod: CRNA,,, | Performed by: NURSE ANESTHETIST, CERTIFIED REGISTERED

## 2021-04-05 PROCEDURE — D9220A PRA ANESTHESIA: ICD-10-PCS | Mod: CRNA,,, | Performed by: NURSE ANESTHETIST, CERTIFIED REGISTERED

## 2021-04-05 PROCEDURE — 96374 THER/PROPH/DIAG INJ IV PUSH: CPT

## 2021-04-05 RX ORDER — SODIUM CHLORIDE 9 MG/ML
INJECTION, SOLUTION INTRAVENOUS
Status: COMPLETED | OUTPATIENT
Start: 2021-04-05 | End: 2021-04-05

## 2021-04-05 RX ORDER — LIDOCAINE HYDROCHLORIDE 20 MG/ML
INJECTION INTRAVENOUS
Status: DISCONTINUED | OUTPATIENT
Start: 2021-04-05 | End: 2021-04-05

## 2021-04-05 RX ORDER — ONDANSETRON 4 MG/1
4 TABLET, ORALLY DISINTEGRATING ORAL
Status: COMPLETED | OUTPATIENT
Start: 2021-04-05 | End: 2021-04-05

## 2021-04-05 RX ORDER — FENTANYL CITRATE 50 UG/ML
25 INJECTION, SOLUTION INTRAMUSCULAR; INTRAVENOUS
Status: COMPLETED | OUTPATIENT
Start: 2021-04-05 | End: 2021-04-05

## 2021-04-05 RX ORDER — MORPHINE SULFATE 4 MG/ML
2 INJECTION, SOLUTION INTRAMUSCULAR; INTRAVENOUS
Status: DISCONTINUED | OUTPATIENT
Start: 2021-04-05 | End: 2021-04-05

## 2021-04-05 RX ORDER — HYDROCODONE BITARTRATE AND ACETAMINOPHEN 5; 325 MG/1; MG/1
1 TABLET ORAL EVERY 4 HOURS PRN
Qty: 6 TABLET | Refills: 0 | Status: SHIPPED | OUTPATIENT
Start: 2021-04-05 | End: 2021-04-15

## 2021-04-05 RX ORDER — PROPOFOL 10 MG/ML
VIAL (ML) INTRAVENOUS
Status: DISCONTINUED | OUTPATIENT
Start: 2021-04-05 | End: 2021-04-05

## 2021-04-05 RX ADMIN — PROPOFOL 50 MG: 10 INJECTION, EMULSION INTRAVENOUS at 03:04

## 2021-04-05 RX ADMIN — SODIUM CHLORIDE: 0.9 INJECTION, SOLUTION INTRAVENOUS at 02:04

## 2021-04-05 RX ADMIN — FENTANYL CITRATE 25 MCG: 50 INJECTION, SOLUTION INTRAMUSCULAR; INTRAVENOUS at 02:04

## 2021-04-05 RX ADMIN — PROPOFOL 10 MG: 10 INJECTION, EMULSION INTRAVENOUS at 03:04

## 2021-04-05 RX ADMIN — Medication 100 MG: at 03:04

## 2021-04-16 ENCOUNTER — OFFICE VISIT (OUTPATIENT)
Dept: ORTHOPEDICS | Facility: CLINIC | Age: 81
End: 2021-04-16
Payer: MEDICARE

## 2021-04-16 VITALS
SYSTOLIC BLOOD PRESSURE: 142 MMHG | TEMPERATURE: 98 F | HEIGHT: 61 IN | BODY MASS INDEX: 22.31 KG/M2 | WEIGHT: 118.19 LBS | DIASTOLIC BLOOD PRESSURE: 86 MMHG | RESPIRATION RATE: 18 BRPM | OXYGEN SATURATION: 96 % | HEART RATE: 61 BPM

## 2021-04-16 DIAGNOSIS — M25.512 ACUTE PAIN OF LEFT SHOULDER: ICD-10-CM

## 2021-04-16 DIAGNOSIS — S43.085A CLOSED DISLOCATION OF LEFT GLENOHUMERAL JOINT, INITIAL ENCOUNTER: Primary | ICD-10-CM

## 2021-04-16 PROCEDURE — 99213 OFFICE O/P EST LOW 20 MIN: CPT | Mod: S$GLB,,, | Performed by: ORTHOPAEDIC SURGERY

## 2021-04-16 PROCEDURE — 1159F PR MEDICATION LIST DOCUMENTED IN MEDICAL RECORD: ICD-10-PCS | Mod: S$GLB,,, | Performed by: ORTHOPAEDIC SURGERY

## 2021-04-16 PROCEDURE — 3288F PR FALLS RISK ASSESSMENT DOCUMENTED: ICD-10-PCS | Mod: CPTII,S$GLB,, | Performed by: ORTHOPAEDIC SURGERY

## 2021-04-16 PROCEDURE — 1126F AMNT PAIN NOTED NONE PRSNT: CPT | Mod: S$GLB,,, | Performed by: ORTHOPAEDIC SURGERY

## 2021-04-16 PROCEDURE — 99213 PR OFFICE/OUTPT VISIT, EST, LEVL III, 20-29 MIN: ICD-10-PCS | Mod: S$GLB,,, | Performed by: ORTHOPAEDIC SURGERY

## 2021-04-16 PROCEDURE — 99999 PR PBB SHADOW E&M-EST. PATIENT-LVL IV: ICD-10-PCS | Mod: PBBFAC,,, | Performed by: ORTHOPAEDIC SURGERY

## 2021-04-16 PROCEDURE — 3288F FALL RISK ASSESSMENT DOCD: CPT | Mod: CPTII,S$GLB,, | Performed by: ORTHOPAEDIC SURGERY

## 2021-04-16 PROCEDURE — 1159F MED LIST DOCD IN RCRD: CPT | Mod: S$GLB,,, | Performed by: ORTHOPAEDIC SURGERY

## 2021-04-16 PROCEDURE — 1101F PT FALLS ASSESS-DOCD LE1/YR: CPT | Mod: CPTII,S$GLB,, | Performed by: ORTHOPAEDIC SURGERY

## 2021-04-16 PROCEDURE — 1126F PR PAIN SEVERITY QUANTIFIED, NO PAIN PRESENT: ICD-10-PCS | Mod: S$GLB,,, | Performed by: ORTHOPAEDIC SURGERY

## 2021-04-16 PROCEDURE — 1101F PR PT FALLS ASSESS DOC 0-1 FALLS W/OUT INJ PAST YR: ICD-10-PCS | Mod: CPTII,S$GLB,, | Performed by: ORTHOPAEDIC SURGERY

## 2021-04-16 PROCEDURE — 99999 PR PBB SHADOW E&M-EST. PATIENT-LVL IV: CPT | Mod: PBBFAC,,, | Performed by: ORTHOPAEDIC SURGERY

## 2021-04-21 ENCOUNTER — HOSPITAL ENCOUNTER (OUTPATIENT)
Dept: RADIOLOGY | Facility: HOSPITAL | Age: 81
Discharge: HOME | End: 2021-04-21
Attending: ORTHOPAEDIC SURGERY
Payer: MEDICARE

## 2021-04-21 DIAGNOSIS — M25.512 ACUTE PAIN OF LEFT SHOULDER: ICD-10-CM

## 2021-04-21 PROCEDURE — 73221 MRI JOINT UPR EXTREM W/O DYE: CPT | Mod: TC,LT

## 2021-04-21 PROCEDURE — 73221 MRI JOINT UPR EXTREM W/O DYE: CPT | Mod: 26,LT,, | Performed by: RADIOLOGY

## 2021-04-21 PROCEDURE — 73221 MRI SHOULDER WITHOUT CONTRAST LEFT: ICD-10-PCS | Mod: 26,LT,, | Performed by: RADIOLOGY

## 2021-04-23 ENCOUNTER — OFFICE VISIT (OUTPATIENT)
Dept: ORTHOPEDICS | Facility: CLINIC | Age: 81
End: 2021-04-23
Payer: MEDICARE

## 2021-04-23 VITALS
SYSTOLIC BLOOD PRESSURE: 130 MMHG | RESPIRATION RATE: 18 BRPM | DIASTOLIC BLOOD PRESSURE: 70 MMHG | HEIGHT: 61 IN | WEIGHT: 119.06 LBS | OXYGEN SATURATION: 98 % | HEART RATE: 63 BPM | BODY MASS INDEX: 22.48 KG/M2

## 2021-04-23 DIAGNOSIS — S43.085A CLOSED DISLOCATION OF LEFT GLENOHUMERAL JOINT, INITIAL ENCOUNTER: Primary | ICD-10-CM

## 2021-04-23 PROCEDURE — 1126F PR PAIN SEVERITY QUANTIFIED, NO PAIN PRESENT: ICD-10-PCS | Mod: S$GLB,,, | Performed by: ORTHOPAEDIC SURGERY

## 2021-04-23 PROCEDURE — 1126F AMNT PAIN NOTED NONE PRSNT: CPT | Mod: S$GLB,,, | Performed by: ORTHOPAEDIC SURGERY

## 2021-04-23 PROCEDURE — 99213 OFFICE O/P EST LOW 20 MIN: CPT | Mod: S$GLB,,, | Performed by: ORTHOPAEDIC SURGERY

## 2021-04-23 PROCEDURE — 3288F FALL RISK ASSESSMENT DOCD: CPT | Mod: CPTII,S$GLB,, | Performed by: ORTHOPAEDIC SURGERY

## 2021-04-23 PROCEDURE — 1159F PR MEDICATION LIST DOCUMENTED IN MEDICAL RECORD: ICD-10-PCS | Mod: S$GLB,,, | Performed by: ORTHOPAEDIC SURGERY

## 2021-04-23 PROCEDURE — 99999 PR PBB SHADOW E&M-EST. PATIENT-LVL III: ICD-10-PCS | Mod: PBBFAC,,, | Performed by: ORTHOPAEDIC SURGERY

## 2021-04-23 PROCEDURE — 99999 PR PBB SHADOW E&M-EST. PATIENT-LVL III: CPT | Mod: PBBFAC,,, | Performed by: ORTHOPAEDIC SURGERY

## 2021-04-23 PROCEDURE — 99213 PR OFFICE/OUTPT VISIT, EST, LEVL III, 20-29 MIN: ICD-10-PCS | Mod: S$GLB,,, | Performed by: ORTHOPAEDIC SURGERY

## 2021-04-23 PROCEDURE — 1101F PT FALLS ASSESS-DOCD LE1/YR: CPT | Mod: CPTII,S$GLB,, | Performed by: ORTHOPAEDIC SURGERY

## 2021-04-23 PROCEDURE — 3288F PR FALLS RISK ASSESSMENT DOCUMENTED: ICD-10-PCS | Mod: CPTII,S$GLB,, | Performed by: ORTHOPAEDIC SURGERY

## 2021-04-23 PROCEDURE — 1101F PR PT FALLS ASSESS DOC 0-1 FALLS W/OUT INJ PAST YR: ICD-10-PCS | Mod: CPTII,S$GLB,, | Performed by: ORTHOPAEDIC SURGERY

## 2021-04-23 PROCEDURE — 1159F MED LIST DOCD IN RCRD: CPT | Mod: S$GLB,,, | Performed by: ORTHOPAEDIC SURGERY

## 2021-04-23 RX ORDER — ATORVASTATIN CALCIUM 80 MG/1
80 TABLET, FILM COATED ORAL
COMMUNITY
Start: 2021-02-11

## 2021-04-23 RX ORDER — METOPROLOL TARTRATE 50 MG/1
TABLET ORAL
COMMUNITY
Start: 2021-04-13 | End: 2021-04-23

## 2021-04-23 RX ORDER — AMLODIPINE BESYLATE 5 MG/1
TABLET ORAL
COMMUNITY
Start: 2021-04-13

## 2021-04-23 RX ORDER — CLOPIDOGREL BISULFATE 75 MG/1
75 TABLET ORAL
COMMUNITY
Start: 2021-02-11

## 2021-05-04 ENCOUNTER — HOSPITAL ENCOUNTER (OUTPATIENT)
Dept: RADIOLOGY | Facility: HOSPITAL | Age: 81
Discharge: HOME | End: 2021-05-04
Attending: ORTHOPAEDIC SURGERY
Payer: MEDICARE

## 2021-05-04 ENCOUNTER — TELEPHONE (OUTPATIENT)
Dept: CARDIOLOGY | Facility: CLINIC | Age: 81
End: 2021-05-04

## 2021-05-04 DIAGNOSIS — S43.085A CLOSED DISLOCATION OF LEFT GLENOHUMERAL JOINT, INITIAL ENCOUNTER: ICD-10-CM

## 2021-05-04 PROCEDURE — 73200 CT UPPER EXTREMITY W/O DYE: CPT | Mod: 26,LT,, | Performed by: RADIOLOGY

## 2021-05-04 PROCEDURE — 73200 CT SHOULDER WITHOUT CONTRAST LEFT: ICD-10-PCS | Mod: 26,LT,, | Performed by: RADIOLOGY

## 2021-05-04 PROCEDURE — 73200 CT UPPER EXTREMITY W/O DYE: CPT | Mod: TC,LT

## 2021-05-06 DIAGNOSIS — Z01.810 PREOP CARDIOVASCULAR EXAM: ICD-10-CM

## 2021-05-06 DIAGNOSIS — S43.085A CLOSED DISLOCATION OF LEFT GLENOHUMERAL JOINT, INITIAL ENCOUNTER: Primary | ICD-10-CM

## 2021-05-06 RX ORDER — PREGABALIN 25 MG/1
75 CAPSULE ORAL NIGHTLY
Status: CANCELLED | OUTPATIENT
Start: 2021-05-06

## 2021-05-06 RX ORDER — SODIUM CHLORIDE 0.9 % (FLUSH) 0.9 %
10 SYRINGE (ML) INJECTION
Status: CANCELLED | OUTPATIENT
Start: 2021-05-06

## 2021-05-06 RX ORDER — AMOXICILLIN 250 MG
1 CAPSULE ORAL 2 TIMES DAILY
Status: CANCELLED | OUTPATIENT
Start: 2021-05-06

## 2021-05-06 RX ORDER — ACETAMINOPHEN 500 MG
1000 TABLET ORAL EVERY 6 HOURS
Status: CANCELLED | OUTPATIENT
Start: 2021-05-06 | End: 2021-05-08

## 2021-05-06 RX ORDER — FENTANYL CITRATE 50 UG/ML
25 INJECTION, SOLUTION INTRAMUSCULAR; INTRAVENOUS EVERY 5 MIN PRN
Status: CANCELLED | OUTPATIENT
Start: 2021-05-06

## 2021-05-06 RX ORDER — MIDAZOLAM HYDROCHLORIDE 1 MG/ML
1 INJECTION INTRAMUSCULAR; INTRAVENOUS EVERY 5 MIN PRN
Status: CANCELLED | OUTPATIENT
Start: 2021-05-06

## 2021-05-06 RX ORDER — PROCHLORPERAZINE EDISYLATE 5 MG/ML
5 INJECTION INTRAMUSCULAR; INTRAVENOUS EVERY 6 HOURS PRN
Status: CANCELLED | OUTPATIENT
Start: 2021-05-06

## 2021-05-06 RX ORDER — DEXTROSE MONOHYDRATE AND SODIUM CHLORIDE 5; .9 G/100ML; G/100ML
INJECTION, SOLUTION INTRAVENOUS CONTINUOUS
Status: CANCELLED | OUTPATIENT
Start: 2021-05-06

## 2021-05-06 RX ORDER — FAMOTIDINE 20 MG/1
20 TABLET, FILM COATED ORAL 2 TIMES DAILY
Status: CANCELLED | OUTPATIENT
Start: 2021-05-06

## 2021-05-06 RX ORDER — ONDANSETRON 4 MG/1
8 TABLET, ORALLY DISINTEGRATING ORAL EVERY 8 HOURS PRN
Status: CANCELLED | OUTPATIENT
Start: 2021-05-06

## 2021-05-06 RX ORDER — HYDROCODONE BITARTRATE AND ACETAMINOPHEN 10; 325 MG/1; MG/1
1 TABLET ORAL EVERY 4 HOURS PRN
Status: CANCELLED | OUTPATIENT
Start: 2021-05-06

## 2021-05-06 RX ORDER — MUPIROCIN 20 MG/G
OINTMENT TOPICAL
Status: CANCELLED | OUTPATIENT
Start: 2021-05-06

## 2021-05-06 RX ORDER — ACETAMINOPHEN 500 MG
1000 TABLET ORAL
Status: CANCELLED | OUTPATIENT
Start: 2021-05-06

## 2021-05-06 RX ORDER — HYDROCODONE BITARTRATE AND ACETAMINOPHEN 5; 325 MG/1; MG/1
1 TABLET ORAL EVERY 4 HOURS PRN
Status: CANCELLED | OUTPATIENT
Start: 2021-05-06

## 2021-05-06 RX ORDER — POLYETHYLENE GLYCOL 3350 17 G/17G
17 POWDER, FOR SOLUTION ORAL DAILY
Status: CANCELLED | OUTPATIENT
Start: 2021-05-06

## 2021-05-06 RX ORDER — MUPIROCIN 20 MG/G
1 OINTMENT TOPICAL 2 TIMES DAILY
Status: CANCELLED | OUTPATIENT
Start: 2021-05-06 | End: 2021-05-11

## 2021-05-19 ENCOUNTER — HOSPITAL ENCOUNTER (OUTPATIENT)
Dept: PREADMISSION TESTING | Facility: HOSPITAL | Age: 81
Discharge: HOME | End: 2021-05-19
Attending: ORTHOPAEDIC SURGERY
Payer: MEDICARE

## 2021-05-19 ENCOUNTER — TELEPHONE (OUTPATIENT)
Dept: CARDIOLOGY | Facility: CLINIC | Age: 81
End: 2021-05-19

## 2021-05-19 VITALS
SYSTOLIC BLOOD PRESSURE: 151 MMHG | TEMPERATURE: 97 F | HEART RATE: 56 BPM | RESPIRATION RATE: 18 BRPM | WEIGHT: 118.38 LBS | HEIGHT: 61 IN | DIASTOLIC BLOOD PRESSURE: 75 MMHG | OXYGEN SATURATION: 98 % | BODY MASS INDEX: 22.35 KG/M2

## 2021-05-19 DIAGNOSIS — Z01.818 PREOPERATIVE TESTING: Primary | ICD-10-CM

## 2021-05-19 LAB
ALBUMIN SERPL BCP-MCNC: 3.9 G/DL (ref 3.5–5.2)
ALP SERPL-CCNC: 81 U/L (ref 55–135)
ALT SERPL W/O P-5'-P-CCNC: 7 U/L (ref 10–44)
ANION GAP SERPL CALC-SCNC: 6 MMOL/L (ref 8–16)
AST SERPL-CCNC: 17 U/L (ref 10–40)
BASOPHILS # BLD AUTO: 0.01 K/UL (ref 0–0.2)
BASOPHILS NFR BLD: 0.1 % (ref 0–1.9)
BILIRUB SERPL-MCNC: 0.9 MG/DL (ref 0.1–1)
BUN SERPL-MCNC: 20 MG/DL (ref 8–23)
CALCIUM SERPL-MCNC: 9.5 MG/DL (ref 8.7–10.5)
CHLORIDE SERPL-SCNC: 109 MMOL/L (ref 95–110)
CO2 SERPL-SCNC: 26 MMOL/L (ref 23–29)
CREAT SERPL-MCNC: 1.5 MG/DL (ref 0.5–1.4)
DIFFERENTIAL METHOD: ABNORMAL
EOSINOPHIL # BLD AUTO: 0.1 K/UL (ref 0–0.5)
EOSINOPHIL NFR BLD: 1.1 % (ref 0–8)
ERYTHROCYTE [DISTWIDTH] IN BLOOD BY AUTOMATED COUNT: 16.4 % (ref 11.5–14.5)
EST. GFR  (AFRICAN AMERICAN): 37 ML/MIN/1.73 M^2
EST. GFR  (NON AFRICAN AMERICAN): 32 ML/MIN/1.73 M^2
GLUCOSE SERPL-MCNC: 95 MG/DL (ref 70–110)
HCT VFR BLD AUTO: 36.4 % (ref 37–48.5)
HGB BLD-MCNC: 11.5 G/DL (ref 12–16)
IMM GRANULOCYTES # BLD AUTO: 0.02 K/UL (ref 0–0.04)
IMM GRANULOCYTES NFR BLD AUTO: 0.2 % (ref 0–0.5)
LYMPHOCYTES # BLD AUTO: 1.2 K/UL (ref 1–4.8)
LYMPHOCYTES NFR BLD: 14.1 % (ref 18–48)
MCH RBC QN AUTO: 27.3 PG (ref 27–31)
MCHC RBC AUTO-ENTMCNC: 31.6 G/DL (ref 32–36)
MCV RBC AUTO: 86 FL (ref 82–98)
MONOCYTES # BLD AUTO: 0.6 K/UL (ref 0.3–1)
MONOCYTES NFR BLD: 6.6 % (ref 4–15)
NEUTROPHILS # BLD AUTO: 6.4 K/UL (ref 1.8–7.7)
NEUTROPHILS NFR BLD: 77.9 % (ref 38–73)
NRBC BLD-RTO: 0 /100 WBC
PLATELET # BLD AUTO: 167 K/UL (ref 150–450)
PMV BLD AUTO: 10.7 FL (ref 9.2–12.9)
POTASSIUM SERPL-SCNC: 4.4 MMOL/L (ref 3.5–5.1)
PROT SERPL-MCNC: 7.8 G/DL (ref 6–8.4)
RBC # BLD AUTO: 4.22 M/UL (ref 4–5.4)
SODIUM SERPL-SCNC: 141 MMOL/L (ref 136–145)
WBC # BLD AUTO: 8.28 K/UL (ref 3.9–12.7)

## 2021-05-19 PROCEDURE — 80053 COMPREHEN METABOLIC PANEL: CPT | Performed by: ORTHOPAEDIC SURGERY

## 2021-05-19 PROCEDURE — 85025 COMPLETE CBC W/AUTO DIFF WBC: CPT | Performed by: ORTHOPAEDIC SURGERY

## 2021-05-21 ENCOUNTER — TELEPHONE (OUTPATIENT)
Dept: ORTHOPEDICS | Facility: CLINIC | Age: 81
End: 2021-05-21

## 2021-05-21 DIAGNOSIS — S43.085A CLOSED DISLOCATION OF LEFT GLENOHUMERAL JOINT, INITIAL ENCOUNTER: Primary | ICD-10-CM

## 2021-06-11 ENCOUNTER — HOSPITAL ENCOUNTER (EMERGENCY)
Facility: HOSPITAL | Age: 81
Discharge: HOME | End: 2021-06-11
Attending: EMERGENCY MEDICINE
Payer: MEDICARE

## 2021-06-11 VITALS
TEMPERATURE: 98 F | RESPIRATION RATE: 18 BRPM | WEIGHT: 120 LBS | HEART RATE: 80 BPM | OXYGEN SATURATION: 96 % | SYSTOLIC BLOOD PRESSURE: 187 MMHG | HEIGHT: 60 IN | BODY MASS INDEX: 23.56 KG/M2 | DIASTOLIC BLOOD PRESSURE: 78 MMHG

## 2021-06-11 DIAGNOSIS — M25.512 LEFT SHOULDER PAIN: ICD-10-CM

## 2021-06-11 DIAGNOSIS — M25.519 SHOULDER PAIN: ICD-10-CM

## 2021-06-11 DIAGNOSIS — S43.015A ANTERIOR DISLOCATION OF LEFT SHOULDER, INITIAL ENCOUNTER: Primary | ICD-10-CM

## 2021-06-11 PROCEDURE — 63600175 PHARM REV CODE 636 W HCPCS: Performed by: EMERGENCY MEDICINE

## 2021-06-11 PROCEDURE — 99152 MOD SED SAME PHYS/QHP 5/>YRS: CPT

## 2021-06-11 PROCEDURE — 23650 CLTX SHO DSLC W/MNPJ WO ANES: CPT | Mod: LT

## 2021-06-11 PROCEDURE — 96374 THER/PROPH/DIAG INJ IV PUSH: CPT | Mod: 59

## 2021-06-11 PROCEDURE — 99285 EMERGENCY DEPT VISIT HI MDM: CPT | Mod: 25

## 2021-06-11 PROCEDURE — 25000003 PHARM REV CODE 250: Performed by: EMERGENCY MEDICINE

## 2021-06-11 PROCEDURE — 96361 HYDRATE IV INFUSION ADD-ON: CPT

## 2021-06-11 RX ORDER — PROPOFOL 10 MG/ML
200 VIAL (ML) INTRAVENOUS ONCE
Status: COMPLETED | OUTPATIENT
Start: 2021-06-11 | End: 2021-06-11

## 2021-06-11 RX ORDER — SODIUM CHLORIDE 9 MG/ML
INJECTION, SOLUTION INTRAVENOUS
Status: COMPLETED | OUTPATIENT
Start: 2021-06-11 | End: 2021-06-11

## 2021-06-11 RX ORDER — HYDROCODONE BITARTRATE AND ACETAMINOPHEN 5; 325 MG/1; MG/1
1 TABLET ORAL EVERY 8 HOURS PRN
Qty: 9 TABLET | Refills: 0 | Status: SHIPPED | OUTPATIENT
Start: 2021-06-11 | End: 2021-06-14

## 2021-06-11 RX ORDER — MORPHINE SULFATE 4 MG/ML
4 INJECTION, SOLUTION INTRAMUSCULAR; INTRAVENOUS
Status: COMPLETED | OUTPATIENT
Start: 2021-06-11 | End: 2021-06-11

## 2021-06-11 RX ORDER — ACETAMINOPHEN 325 MG/1
650 TABLET ORAL
Status: DISCONTINUED | OUTPATIENT
Start: 2021-06-11 | End: 2021-06-11 | Stop reason: HOSPADM

## 2021-06-11 RX ADMIN — PROPOFOL 200 MG: 10 INJECTION, EMULSION INTRAVENOUS at 02:06

## 2021-06-11 RX ADMIN — SODIUM CHLORIDE: 0.9 INJECTION, SOLUTION INTRAVENOUS at 12:06

## 2021-06-11 RX ADMIN — MORPHINE SULFATE 4 MG: 4 INJECTION, SOLUTION INTRAMUSCULAR; INTRAVENOUS at 12:06

## 2021-07-02 ENCOUNTER — HOSPITAL ENCOUNTER (EMERGENCY)
Facility: HOSPITAL | Age: 81
Discharge: HOME | End: 2021-07-03
Attending: EMERGENCY MEDICINE | Admitting: EMERGENCY MEDICINE
Payer: MEDICARE

## 2021-07-02 DIAGNOSIS — R07.9 CHEST PAIN: ICD-10-CM

## 2021-07-02 LAB
ALBUMIN SERPL BCP-MCNC: 3.7 G/DL (ref 3.5–5.2)
ALP SERPL-CCNC: 73 U/L (ref 55–135)
ALT SERPL W/O P-5'-P-CCNC: 8 U/L (ref 10–44)
ANION GAP SERPL CALC-SCNC: 10 MMOL/L (ref 8–16)
AST SERPL-CCNC: 15 U/L (ref 10–40)
BASOPHILS # BLD AUTO: 0.02 K/UL (ref 0–0.2)
BASOPHILS NFR BLD: 0.2 % (ref 0–1.9)
BILIRUB SERPL-MCNC: 1.7 MG/DL (ref 0.1–1)
BNP SERPL-MCNC: 445 PG/ML (ref 0–99)
BUN SERPL-MCNC: 14 MG/DL (ref 8–23)
CALCIUM SERPL-MCNC: 8.9 MG/DL (ref 8.7–10.5)
CHLORIDE SERPL-SCNC: 105 MMOL/L (ref 95–110)
CO2 SERPL-SCNC: 23 MMOL/L (ref 23–29)
CREAT SERPL-MCNC: 1.4 MG/DL (ref 0.5–1.4)
DIFFERENTIAL METHOD: ABNORMAL
EOSINOPHIL # BLD AUTO: 0 K/UL (ref 0–0.5)
EOSINOPHIL NFR BLD: 0 % (ref 0–8)
ERYTHROCYTE [DISTWIDTH] IN BLOOD BY AUTOMATED COUNT: 16.3 % (ref 11.5–14.5)
EST. GFR  (AFRICAN AMERICAN): 41 ML/MIN/1.73 M^2
EST. GFR  (NON AFRICAN AMERICAN): 35 ML/MIN/1.73 M^2
GLUCOSE SERPL-MCNC: 113 MG/DL (ref 70–110)
HCT VFR BLD AUTO: 34.3 % (ref 37–48.5)
HGB BLD-MCNC: 10.9 G/DL (ref 12–16)
IMM GRANULOCYTES # BLD AUTO: 0.05 K/UL (ref 0–0.04)
IMM GRANULOCYTES NFR BLD AUTO: 0.5 % (ref 0–0.5)
LYMPHOCYTES # BLD AUTO: 0.7 K/UL (ref 1–4.8)
LYMPHOCYTES NFR BLD: 7.2 % (ref 18–48)
MCH RBC QN AUTO: 27.7 PG (ref 27–31)
MCHC RBC AUTO-ENTMCNC: 31.8 G/DL (ref 32–36)
MCV RBC AUTO: 87 FL (ref 82–98)
MONOCYTES # BLD AUTO: 1 K/UL (ref 0.3–1)
MONOCYTES NFR BLD: 10.2 % (ref 4–15)
NEUTROPHILS # BLD AUTO: 8 K/UL (ref 1.8–7.7)
NEUTROPHILS NFR BLD: 81.9 % (ref 38–73)
NRBC BLD-RTO: 0 /100 WBC
PLATELET # BLD AUTO: 161 K/UL (ref 150–450)
PMV BLD AUTO: 10.6 FL (ref 9.2–12.9)
POTASSIUM SERPL-SCNC: 3.5 MMOL/L (ref 3.5–5.1)
PROT SERPL-MCNC: 7.7 G/DL (ref 6–8.4)
RBC # BLD AUTO: 3.93 M/UL (ref 4–5.4)
SODIUM SERPL-SCNC: 138 MMOL/L (ref 136–145)
TROPONIN I SERPL DL<=0.01 NG/ML-MCNC: 0.08 NG/ML (ref 0–0.03)
TROPONIN I SERPL DL<=0.01 NG/ML-MCNC: 0.09 NG/ML (ref 0–0.03)
WBC # BLD AUTO: 9.82 K/UL (ref 3.9–12.7)

## 2021-07-02 PROCEDURE — 93010 EKG 12-LEAD: ICD-10-PCS | Mod: ,,, | Performed by: INTERNAL MEDICINE

## 2021-07-02 PROCEDURE — 85025 COMPLETE CBC W/AUTO DIFF WBC: CPT | Performed by: EMERGENCY MEDICINE

## 2021-07-02 PROCEDURE — 84484 ASSAY OF TROPONIN QUANT: CPT | Mod: 91 | Performed by: EMERGENCY MEDICINE

## 2021-07-02 PROCEDURE — 93005 ELECTROCARDIOGRAM TRACING: CPT

## 2021-07-02 PROCEDURE — 99285 EMERGENCY DEPT VISIT HI MDM: CPT | Mod: 25

## 2021-07-02 PROCEDURE — 83880 ASSAY OF NATRIURETIC PEPTIDE: CPT | Performed by: EMERGENCY MEDICINE

## 2021-07-02 PROCEDURE — 80053 COMPREHEN METABOLIC PANEL: CPT | Performed by: EMERGENCY MEDICINE

## 2021-07-02 PROCEDURE — 93010 ELECTROCARDIOGRAM REPORT: CPT | Mod: ,,, | Performed by: INTERNAL MEDICINE

## 2021-07-02 RX ORDER — ASPIRIN 325 MG
325 TABLET ORAL
Status: DISCONTINUED | OUTPATIENT
Start: 2021-07-02 | End: 2021-07-03 | Stop reason: HOSPADM

## 2021-07-03 VITALS
OXYGEN SATURATION: 94 % | RESPIRATION RATE: 18 BRPM | WEIGHT: 120 LBS | HEIGHT: 60 IN | TEMPERATURE: 99 F | SYSTOLIC BLOOD PRESSURE: 136 MMHG | DIASTOLIC BLOOD PRESSURE: 75 MMHG | HEART RATE: 74 BPM | BODY MASS INDEX: 23.56 KG/M2

## 2021-07-09 ENCOUNTER — NURSE TRIAGE (OUTPATIENT)
Dept: ADMINISTRATIVE | Facility: CLINIC | Age: 81
End: 2021-07-09

## (undated) DEVICE — INSERTS STEALTH FIBRA SIZE 1.

## (undated) DEVICE — EVACUATOR WOUND BULB 100CC

## (undated) DEVICE — COVER LIGHT HANDLE 80/CA

## (undated) DEVICE — FORCEP STRAIGHT DISP

## (undated) DEVICE — ELECTRODE REM PLYHSV RETURN 9

## (undated) DEVICE — TRAY FOLEY 16FR INFECTION CONT

## (undated) DEVICE — DRAIN CHANNEL ROUND 15FR

## (undated) DEVICE — SEE MEDLINE ITEM 157194

## (undated) DEVICE — KIT SHUNT ARTERY 6
Type: IMPLANTABLE DEVICE | Site: CAROTID | Status: NON-FUNCTIONAL
Removed: 2019-04-10

## (undated) DEVICE — SUT PROLENE 6-0 C-1 30IN BL

## (undated) DEVICE — SET DECANTER MEDICHOICE

## (undated) DEVICE — SUT 3-0 12-18IN SILK

## (undated) DEVICE — SPONGE GAUZE 16PLY 4X4

## (undated) DEVICE — SUT MCRYL PLUS 4-0 PS2 27IN

## (undated) DEVICE — SUT VICRYL 3-0 27 SH

## (undated) DEVICE — DRAPE STERI INSTRUMENT 1018

## (undated) DEVICE — APPLICATOR CHLORAPREP ORN 26ML

## (undated) DEVICE — SEE MEDLINE ITEM 156911

## (undated) DEVICE — SEE MEDLINE ITEM 153688

## (undated) DEVICE — SUT 2-0 12-18IN SILK

## (undated) DEVICE — CORD BIPOLAR 12 FOOT

## (undated) DEVICE — CATH ALL PUR URTHL RR 10FR

## (undated) DEVICE — DRESSING TELFA STRL 4X3 LF

## (undated) DEVICE — DRAPE THYROID WITH ARMBOARD

## (undated) DEVICE — SYS CLSR DERMABOND PRINEO 22CM

## (undated) DEVICE — HEMOSTAT SURGICEL 4X8IN

## (undated) DEVICE — LOOP VESSEL BLUE MAXI

## (undated) DEVICE — DRESSING TRANS 4X4 TEGADERM

## (undated) DEVICE — DRESSING ABSRBNT ISLAND 3.6X8

## (undated) DEVICE — SEE MEDLINE ITEM 152622

## (undated) DEVICE — BLADE SCALP OPHTL BEVEL STR

## (undated) DEVICE — SUT 4-0 12-18IN SILK BLACK